# Patient Record
Sex: FEMALE | Race: WHITE | Employment: OTHER | URBAN - METROPOLITAN AREA
[De-identification: names, ages, dates, MRNs, and addresses within clinical notes are randomized per-mention and may not be internally consistent; named-entity substitution may affect disease eponyms.]

---

## 2017-02-16 ENCOUNTER — HOSPITAL ENCOUNTER (OUTPATIENT)
Facility: CLINIC | Age: 72
End: 2017-02-16
Attending: OPHTHALMOLOGY | Admitting: OPHTHALMOLOGY
Payer: COMMERCIAL

## 2017-03-13 ENCOUNTER — HOSPITAL ENCOUNTER (OUTPATIENT)
Facility: CLINIC | Age: 72
Discharge: HOME | End: 2017-03-13
Attending: OPHTHALMOLOGY | Admitting: OPHTHALMOLOGY
Payer: COMMERCIAL

## 2017-03-13 PROCEDURE — 65760 KERATOMILEUSIS: CPT | Performed by: OPHTHALMOLOGY

## 2018-12-12 ENCOUNTER — OFFICE VISIT (OUTPATIENT)
Dept: FAMILY MEDICINE | Facility: CLINIC | Age: 73
End: 2018-12-12
Attending: FAMILY MEDICINE
Payer: COMMERCIAL

## 2018-12-12 VITALS
HEART RATE: 81 BPM | BODY MASS INDEX: 28.51 KG/M2 | SYSTOLIC BLOOD PRESSURE: 137 MMHG | HEIGHT: 64 IN | DIASTOLIC BLOOD PRESSURE: 92 MMHG | WEIGHT: 167 LBS

## 2018-12-12 DIAGNOSIS — R73.03 PRE-DIABETES: ICD-10-CM

## 2018-12-12 DIAGNOSIS — E78.00 HYPERCHOLESTEREMIA: ICD-10-CM

## 2018-12-12 DIAGNOSIS — N81.10 BLADDER PROLAPSE, FEMALE, ACQUIRED: ICD-10-CM

## 2018-12-12 DIAGNOSIS — K52.9 CHRONIC DIARRHEA: Primary | ICD-10-CM

## 2018-12-12 DIAGNOSIS — K22.70 BARRETT'S ESOPHAGUS WITHOUT DYSPLASIA: ICD-10-CM

## 2018-12-12 DIAGNOSIS — I10 BENIGN ESSENTIAL HYPERTENSION: ICD-10-CM

## 2018-12-12 DIAGNOSIS — E78.2 MIXED HYPERLIPIDEMIA: ICD-10-CM

## 2018-12-12 LAB
ANION GAP SERPL CALCULATED.3IONS-SCNC: 8 MMOL/L (ref 3–14)
BUN SERPL-MCNC: 15 MG/DL (ref 7–30)
CALCIUM SERPL-MCNC: 9 MG/DL (ref 8.5–10.1)
CHLORIDE SERPL-SCNC: 102 MMOL/L (ref 94–109)
CHOLEST SERPL-MCNC: 246 MG/DL
CO2 SERPL-SCNC: 30 MMOL/L (ref 20–32)
CREAT SERPL-MCNC: 0.52 MG/DL (ref 0.52–1.04)
GFR SERPL CREATININE-BSD FRML MDRD: >90 ML/MIN/1.7M2
GLUCOSE SERPL-MCNC: 101 MG/DL (ref 70–99)
HBA1C MFR BLD: 5.9 % (ref 0–5.6)
HDLC SERPL-MCNC: 80 MG/DL
LDLC SERPL CALC-MCNC: 88 MG/DL
NONHDLC SERPL-MCNC: 166 MG/DL
POTASSIUM SERPL-SCNC: 4.2 MMOL/L (ref 3.4–5.3)
SODIUM SERPL-SCNC: 140 MMOL/L (ref 133–144)
TRIGL SERPL-MCNC: 389 MG/DL

## 2018-12-12 PROCEDURE — 36415 COLL VENOUS BLD VENIPUNCTURE: CPT | Performed by: FAMILY MEDICINE

## 2018-12-12 PROCEDURE — G0463 HOSPITAL OUTPT CLINIC VISIT: HCPCS | Mod: ZF

## 2018-12-12 PROCEDURE — 80048 BASIC METABOLIC PNL TOTAL CA: CPT | Performed by: FAMILY MEDICINE

## 2018-12-12 PROCEDURE — 80061 LIPID PANEL: CPT | Performed by: FAMILY MEDICINE

## 2018-12-12 PROCEDURE — 83036 HEMOGLOBIN GLYCOSYLATED A1C: CPT | Performed by: FAMILY MEDICINE

## 2018-12-12 RX ORDER — CHOLESTYRAMINE 4 G/9G
4 POWDER, FOR SUSPENSION ORAL DAILY
COMMUNITY
End: 2019-07-10

## 2018-12-12 RX ORDER — AMLODIPINE BESYLATE 10 MG/1
10 TABLET ORAL DAILY
COMMUNITY
End: 2018-12-12 | Stop reason: DRUGHIGH

## 2018-12-12 RX ORDER — AMLODIPINE BESYLATE 5 MG/1
5 TABLET ORAL DAILY
Qty: 30 TABLET | Refills: 3 | Status: SHIPPED | OUTPATIENT
Start: 2018-12-12 | End: 2019-05-03

## 2018-12-12 RX ORDER — LOSARTAN POTASSIUM 50 MG/1
50 TABLET ORAL DAILY
Qty: 30 TABLET | Refills: 3 | Status: SHIPPED | OUTPATIENT
Start: 2018-12-12 | End: 2019-05-03

## 2018-12-12 RX ORDER — FUROSEMIDE 20 MG
20 TABLET ORAL DAILY
COMMUNITY
End: 2020-05-15

## 2018-12-12 ASSESSMENT — MIFFLIN-ST. JEOR: SCORE: 1247.51

## 2018-12-12 ASSESSMENT — PAIN SCALES - GENERAL: PAINLEVEL: NO PAIN (0)

## 2018-12-12 NOTE — PROGRESS NOTES
"Pt. Here to establish care      1. Seborrheic dermatitis, \"Chronic sores\"--seeing dermatology at [facility],  was last seen 5/2018, has appointment tomorrow.  2. Chronic daily diarrhea-- Had hiatal hernia repair at [place] (2014). In 2015, had sigmoid colon removed i due to diverticulitis and colon adhering to bladder.  Incisional hernia followed, later repaired. Diarrhea increased followed colon removal.  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 Now treated with imodium and cholestyramine. She is not currently followed by GI. Currently symptoms as follows:  . If only takes only 1 imodium and 1 4-gm scoop cholestyramine, will have up to 6 bm/day, starts as formed bm and then degrades to liquid. Often uses above meds more frequently to manage during day.  3. GERD--Had a past procedure with device that was recalled from market (Enteryx?), but when doctor went to remove it w/ EGD, device was not present. Told had  Christy's esophagus 2016. Take Prilosec prn 2x/wk--  4. Bladder prolapse -- Has bloating from GI symptoms which pushes down on bladder. Hurts to put pessary in and doesn't sit in properly--old pessary. States bladder comes down to opening of vaginal. No urinary incontinence. When use diuretics, have urinary urgency and need to be close to bathroom.     4. HTN-- Taking norvasc currently. It was prescribed as 10 mg by cardiologist, , but became lightheaded and taking only 5 mg. She does not have known CAD or CKD. Had normal echocardiogram, was told Holter normal 6/2018. BP goal set by cardiology was -120.   Per patient, she is also on Lasix 20 mg for peripheral edema and HTN, and had been  On hydrochlorothiazide 25 mg in past. Feels hydrochlorothiazide worked better. She also takes on OTC K+ 99 mg. Daily. Last lab work was 2016.   5. Small cysts on labia--would like them removed  6. Genital wart--tried Aldara, without success. No abnormal Paps  9. Varicose veins and peripheral edema-, which " "improves with activity  10. Hyperlipidemia--not on medication, TGC in 300's on 2016 labl reviewed 2016 tgc 300's,       PMH  Hospitalized x 2 [facility]  Varicose vein surgery x 2  Bowel Surgeries as above  L lung pneumothorax x 2 and then surgery    Current Outpatient Medications   Medication     amLODIPine (NORVASC) 10 MG tablet     BIOTIN PO     cholestyramine (QUESTRAN) 4 GM/DOSE powder     furosemide (LASIX) 20 MG tablet     Loperamide HCl (IMODIUM OR)     Multiple Vitamins-Minerals (MULTIVITAL PO)     multivitamin  with lutein (OCUVITE WITH LTEIN) CAPS     No current facility-administered medications for this visit.      Social History     Socioeconomic History     Marital status:      Spouse name: Not on file     Number of children: Not on file     Years of education: Not on file     Highest education level: Not on file   Social Needs     Financial resource strain: Not on file     Food insecurity - worry: Not on file     Food insecurity - inability: Not on file     Transportation needs - medical: Not on file     Transportation needs - non-medical: Not on file   Occupational History     Not on file   Tobacco Use     Smoking status: Former Smoker     Smokeless tobacco: Never Used   Substance and Sexual Activity     Alcohol use: Yes     Drug use: No     Sexual activity: Not Currently     Partners: Male     Birth control/protection: None   Other Topics Concern     Not on file   Social History Narrative     Not on file         ROS  Increased stress (death in family)  Increased eating  Occasional lightheadedness with imodium  PHQ9-9, GAD7=6  12 point ROS negative except where noted above    PE  BP (!) 137/92   Pulse 81   Ht 1.626 m (5' 4\")   Wt 75.8 kg (167 lb)   Breastfeeding? No   BMI 28.67 kg/m    Constitutional: Well appearing woman in no acute distress.   Psychological: appropriate mood.  Eyes: anicteric, normal extra-ocular movements,  pupils are equal and reactive to light.   Ears, Nose and Throat:  " throat clear, moist mucous membrames, neck supple with full range of motion.    Neck: No thyroidmegaly. No jugular venous distension, no carotid bruits.  Cardiovascular: regular rate and rhythm, normal S1 and S2, no murmurs, rubs or gallops, peripheral pulses full and symmetric   Respiratory: clear to auscultation, no wheezes or crackles, normal breath sounds.  Gastrointestinal: positive bowel sounds, nontender, no hepatosplenomegaly, no masses. No guarding or rebound. Mild diasthesis rectus  Lymphatic: no cervical lymphadenopathy.  Musculoskeletal: full range of motion and motor strength is equal in the upper and lower extremities    1-2+ pitting edema starts at ankles to below knee, feet only trace. bilateral  Skin: no concerning lesions, no jaundice.  Neurological: cranial nerves intact, normal strength and sensation, reflexes at patella and biceps normal, normal gait, no tremor.   Monofilament Foot Exam: n/a  A/P  1. Chronic diarrhea--multiple medical and surgical factors  Refer to GI for evaluation and management  2. HTN--not controlled on current regimen, side effects given bladder prolapse history. Discussed the nature and treatment of HTN, goal bp 130/80 or less in absence of CAD or CKD. Given urinary symptoms and chronic diarrhea, will avoid diuretics, as not helping edema significantly.  Reduce Norvasc to 5 mg daily, add losartan 50 mg daily. Check BMP.   3. Hyperlipidemia-Fasting lipid panel, will treat as indicated, given elevated TGC, also check HgbA1c  4. GERD--discussed the nature and treatment of GERD/heartburn, and limited role of  hiatal hernia. Plan to stop prilosec, track frequency of symptmos, treat w/liquid antacid prn. If symptoms more than 1-2x/week, can use Zantac otc  5. Will evaluate  issues at next visit and refer to specialists (urology, gyne, etc as needed)    Follow-up 1 month for preventive care visit and re-evaluate HTN

## 2018-12-12 NOTE — LETTER
"12/12/2018       RE: Mariia Hogue  [address]  [facility] [id number]     Dear Colleague,    Thank you for referring your patient, Mariia Hogue, to the [facility] at [facility]. Please see a copy of my visit note below.    Pt. Here to establish care    1. Seborrheic dermatitis, \"Chronic sores\"--seeing dermatology at [facility],  was last seen 5/2018, has appointment tomorrow.  2. Chronic daily diarrhea-- Had hiatal hernia repair at [place] (2014). In 2015, had sigmoid colon removed i due to diverticulitis and colon adhering to bladder.  Incisional hernia followed, later repaired. Diarrhea increased followed colon removal.  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 Now treated with imodium and cholestyramine. She is not currently followed by GI. Currently symptoms as follows:  . If only takes only 1 imodium and 1 4-gm scoop cholestyramine, will have up to 6 bm/day, starts as formed bm and then degrades to liquid. Often uses above meds more frequently to manage during day.  3. GERD--Had a past procedure with device that was recalled from market (Enteryx?), but when doctor went to remove it w/ EGD, device was not present. Told had  Christy's esophagus 2016. Take Prilosec prn 2x/wk--  4. Bladder prolapse -- Has bloating from GI symptoms which pushes down on bladder. Hurts to put pessary in and doesn't sit in properly--old pessary. States bladder comes down to opening of vaginal. No urinary incontinence. When use diuretics, have urinary urgency and need to be close to bathroom.     4. HTN-- Taking norvasc currently. It was prescribed as 10 mg by cardiologist, , but became lightheaded and taking only 5 mg. She does not have known CAD or CKD. Had normal echocardiogram, was told Holter normal 6/2018. BP goal set by cardiology was -120.   Per patient, she is also on Lasix 20 mg for peripheral edema and HTN, and had been  On " hydrochlorothiazide 25 mg in past. Feels hydrochlorothiazide worked better. She also takes on OTC K+ 99 mg. Daily. Last lab work was 2016.   5. Small cysts on labia--would like them removed  6. Genital wart--tried Aldara, without success. No abnormal Paps  9. Varicose veins and peripheral edema-, which improves with activity  10. Hyperlipidemia--not on medication, TGC in 300's on 2016 labl reviewed 2016 tgc 300's,       PMH  Hospitalized x 2 [facility]  Varicose vein surgery x 2  Bowel Surgeries as above  L lung pneumothorax x 2 and then surgery    Current Outpatient Medications   Medication     amLODIPine (NORVASC) 10 MG tablet     BIOTIN PO     cholestyramine (QUESTRAN) 4 GM/DOSE powder     furosemide (LASIX) 20 MG tablet     Loperamide HCl (IMODIUM OR)     Multiple Vitamins-Minerals (MULTIVITAL PO)     multivitamin  with lutein (OCUVITE WITH LTEIN) CAPS     No current facility-administered medications for this visit.      Social History     Socioeconomic History     Marital status:      Spouse name: Not on file     Number of children: Not on file     Years of education: Not on file     Highest education level: Not on file   Social Needs     Financial resource strain: Not on file     Food insecurity - worry: Not on file     Food insecurity - inability: Not on file     Transportation needs - medical: Not on file     Transportation needs - non-medical: Not on file   Occupational History     Not on file   Tobacco Use     Smoking status: Former Smoker     Smokeless tobacco: Never Used   Substance and Sexual Activity     Alcohol use: Yes     Drug use: No     Sexual activity: Not Currently     Partners: Male     Birth control/protection: None   Other Topics Concern     Not on file   Social History Narrative     Not on file         ROS  Increased stress (death in family)  Increased eating  Occasional lightheadedness with imodium  PHQ9-9, GAD7=6  12 point ROS negative except where noted above    PE  BP (!) 137/92   Pulse  "81   Ht 1.626 m (5' 4\")   Wt 75.8 kg (167 lb)   Breastfeeding? No   BMI 28.67 kg/m     Constitutional: Well appearing woman in no acute distress.   Psychological: appropriate mood.  Eyes: anicteric, normal extra-ocular movements,  pupils are equal and reactive to light.   Ears, Nose and Throat:  throat clear, moist mucous membrames, neck supple with full range of motion.    Neck: No thyroidmegaly. No jugular venous distension, no carotid bruits.  Cardiovascular: regular rate and rhythm, normal S1 and S2, no murmurs, rubs or gallops, peripheral pulses full and symmetric   Respiratory: clear to auscultation, no wheezes or crackles, normal breath sounds.  Gastrointestinal: positive bowel sounds, nontender, no hepatosplenomegaly, no masses. No guarding or rebound. Mild diasthesis rectus  Lymphatic: no cervical lymphadenopathy.  Musculoskeletal: full range of motion and motor strength is equal in the upper and lower extremities    1-2+ pitting edema starts at ankles to below knee, feet only trace. bilateral  Skin: no concerning lesions, no jaundice.  Neurological: cranial nerves intact, normal strength and sensation, reflexes at patella and biceps normal, normal gait, no tremor.   Monofilament Foot Exam: n/a  A/P  1. Chronic diarrhea--multiple medical and surgical factors  Refer to GI for evaluation and management  2. HTN--not controlled on current regimen, side effects given bladder prolapse history. Discussed the nature and treatment of HTN, goal bp 130/80 or less in absence of CAD or CKD. Given urinary symptoms and chronic diarrhea, will avoid diuretics, as not helping edema significantly.  Reduce Norvasc to 5 mg daily, add losartan 50 mg daily. Check BMP.   3. Hyperlipidemia-Fasting lipid panel, will treat as indicated, given elevated TGC, also check HgbA1c  4. GERD--discussed the nature and treatment of GERD/heartburn, and limited role of  hiatal hernia. Plan to stop prilosec, track frequency of symptmos, treat " w/liquid antacid prn. If symptoms more than 1-2x/week, can use Zantac otc  5. Will evaluate  issues at next visit and refer to specialists (urology, gyne, etc as needed)    Follow-up 1 month for preventive care visit and re-evaluate HTN    Again, thank you for allowing me to participate in the care of your patient.      Sincerely,    Stewart Stevens MD

## 2018-12-19 PROBLEM — K52.9 CHRONIC DIARRHEA: Status: ACTIVE | Noted: 2018-12-19

## 2018-12-19 PROBLEM — I10 BENIGN ESSENTIAL HYPERTENSION: Status: ACTIVE | Noted: 2018-12-19

## 2018-12-19 PROBLEM — K22.70 BARRETT'S ESOPHAGUS WITHOUT DYSPLASIA: Status: ACTIVE | Noted: 2018-12-19

## 2018-12-19 PROBLEM — N81.10 BLADDER PROLAPSE, FEMALE, ACQUIRED: Status: ACTIVE | Noted: 2018-12-19

## 2018-12-19 PROBLEM — H90.3 BILATERAL SENSORINEURAL HEARING LOSS: Status: ACTIVE | Noted: 2018-04-25

## 2018-12-20 ENCOUNTER — TELEPHONE (OUTPATIENT)
Dept: OBGYN | Facility: CLINIC | Age: 73
End: 2018-12-20

## 2018-12-20 NOTE — TELEPHONE ENCOUNTER
Mariia missed a call from Dr Stevens yesterday so calling back. She is in the process of getting her mychart up and running. She is a new pt for [facility].    I gave her results today over the phone. She has a f/u with Dr Stevens in Jan but not sure if Dr Stevens  wanted her to do anything prior. Dr Stevens back in [facility] 12/26 so asked Mariia to call to triage at that time so we can ask Dr Stevens directly if anything needed prior to appt. Also at that time Mariia should be on mychart and we can release her labwork at that time.Pt indicated understanding and agreed with plan.

## 2018-12-21 ENCOUNTER — TELEPHONE (OUTPATIENT)
Dept: FAMILY MEDICINE | Facility: CLINIC | Age: 73
End: 2018-12-21

## 2018-12-21 PROBLEM — E78.2 MIXED HYPERLIPIDEMIA: Status: ACTIVE | Noted: 2018-12-21

## 2018-12-21 PROBLEM — R73.03 PRE-DIABETES: Status: ACTIVE | Noted: 2018-12-21

## 2018-12-21 NOTE — TELEPHONE ENCOUNTER
Spoke with patient on lab results. Pt was fasting. HgbA1c 5.9, and consistent with pre-DM. Discussed treatment involved diet, activity and sometimes medication, but would need to see GI first given medication potential GI side effects. , discussed factors in this including preDM, fatty foods and especially simple carbohydrates, including ETOH.. Discussed treatment including diet changes, potentially statin medication given A1c.     Reviewed that she should call GI clinic to schedule appt, and at next visit in 1 month will re-evaluate HTN, address pelvic and preventive needs, and readdress above in more detail.

## 2019-01-09 ENCOUNTER — OFFICE VISIT (OUTPATIENT)
Dept: FAMILY MEDICINE | Facility: CLINIC | Age: 74
End: 2019-01-09
Attending: FAMILY MEDICINE
Payer: COMMERCIAL

## 2019-01-09 VITALS
BODY MASS INDEX: 28.31 KG/M2 | DIASTOLIC BLOOD PRESSURE: 79 MMHG | WEIGHT: 164.9 LBS | HEART RATE: 96 BPM | SYSTOLIC BLOOD PRESSURE: 129 MMHG

## 2019-01-09 DIAGNOSIS — Z13.820 SCREENING FOR OSTEOPOROSIS: Primary | ICD-10-CM

## 2019-01-09 DIAGNOSIS — A63.0 GENITAL WARTS: ICD-10-CM

## 2019-01-09 DIAGNOSIS — Z12.39 SCREENING FOR BREAST CANCER: ICD-10-CM

## 2019-01-09 DIAGNOSIS — E78.5 HYPERLIPIDEMIA LDL GOAL <100: ICD-10-CM

## 2019-01-09 DIAGNOSIS — I10 BENIGN ESSENTIAL HYPERTENSION: ICD-10-CM

## 2019-01-09 PROCEDURE — 90750 HZV VACC RECOMBINANT IM: CPT | Mod: ZF

## 2019-01-09 PROCEDURE — G0463 HOSPITAL OUTPT CLINIC VISIT: HCPCS | Mod: ZF

## 2019-01-09 PROCEDURE — 25000581 ZZH RX MED A9270 GY (STAT IND- M) 250: Mod: ZF

## 2019-01-09 PROCEDURE — 90472 IMMUNIZATION ADMIN EACH ADD: CPT | Mod: ZF

## 2019-01-09 PROCEDURE — 90471 IMMUNIZATION ADMIN: CPT | Mod: ZF

## 2019-01-09 PROCEDURE — 25000128 H RX IP 250 OP 636: Mod: ZF

## 2019-01-09 PROCEDURE — 90715 TDAP VACCINE 7 YRS/> IM: CPT | Mod: ZF

## 2019-01-09 RX ORDER — IMIQUIMOD 12.5 MG/.25G
CREAM TOPICAL
Qty: 24 PACKET | Refills: 0 | Status: SHIPPED | OUTPATIENT
Start: 2019-01-09 | End: 2019-10-07

## 2019-01-09 RX ORDER — ATORVASTATIN CALCIUM 10 MG/1
10 TABLET, FILM COATED ORAL DAILY
Qty: 30 TABLET | Refills: 3 | Status: SHIPPED | OUTPATIENT
Start: 2019-01-09 | End: 2019-08-07

## 2019-01-09 ASSESSMENT — PAIN SCALES - GENERAL: PAINLEVEL: NO PAIN (0)

## 2019-01-09 NOTE — PROGRESS NOTES
"Pt.  Here for Preventive care and BP check    1. HTN.--Currently taking Norvasc 5 mg, Losartan. Started out at 50 mg of latter. She has felt off balance for a long time and intially felt better since last visit (off lasix and hydrochlorothiazide), then symptoms felt worse again about last week, so decreased losartan to 25 mg Now on this dose 6 days. Has had this symptom in past, seen ENT for this. Hearing checked.     2. Lipids--Reviewed lab with patient: Cholesterol 246, ,    3. PreDM--reviewed lab with patient. Patient considering Keto diet.   4. Breast--no concerns--no abnormal mammogram in past,  due for mammogram, dense breasts--done at [facility] in past. No Fhx breast cancer  5. Gyne--menopause--age 44.. Paps normal in past. Last pap , normal. No hot flashes/night sweats.   No vaginal discharge. No dryness.  Not currently sexually active with ,  with low libido--  \"sad\" but not current priority.  elective abs. Did take HRT for 2 years. No vaginal estrogen  6. \"Bladder prolapse\" -- Has bloating from GI symptoms which pushes down on bladder. Hurts to put pessary in and doesn't sit in properly--old pessary,.bleeding with use States bladder comes down to opening of vaginal. No urinary incontinence. When uses diuretics, have urinary urgency and need to be close to bathroomBetter when weight is down. Never did pelvic PT.   7. Small cysts on labia--would like them removed. Itching. Present X  at least  8. Genital warts--tried Aldara, without success  9. HCM--dtaP--1952, dt----due, old zoster--, , pneumonia conj--, poly pneumonia \"complete  Hep B series done, had flu vaccine this year  10.Bone--DEXA in past, normal, more than 5 years ago.    PMH  Hospitalized x 2 [facility]  Varicose vein surgery x 2  Bowel Surgeries as above  L lung pneumothorax x 2 and then surgery     FAMILY Hx  Mother--mutilple small ischemic events, dementia,   Father--d. Small cell lung ca, " ETOH,   Sibs--arthritis,   grandfather CVA  grandmother--HTN  Aunt--pancreatic  Grandfather--rectal ca    M. Aunt--ETOH    Social History     Socioeconomic History     Marital status:      Spouse name: Not on file     Number of children: Not on file     Years of education: Not on file     Highest education level: Not on file   Social Needs     Financial resource strain: Not on file     Food insecurity - worry: Not on file     Food insecurity - inability: Not on file     Transportation needs - medical: Not on file     Transportation needs - non-medical: Not on file   Occupational History     Not on file   Tobacco Use     Smoking status: Former Smoker     Smokeless tobacco: Never Used   Substance and Sexual Activity     Alcohol use: Yes     Drug use: No     Sexual activity: Not Currently     Partners: Male     Birth control/protection: None   Other Topics Concern     Not on file   Social History Narrative     Not on file         ROS  12 point ROS negative except where noted above    PE  Blood pressure 129/79, pulse 96, weight 74.8 kg (164 lb 14.4 oz), not currently breastfeeding.  alert, NAD  Genitourinary: External genitalia is atrophic appearance. Normal hair distrution. No enlargement of the [place]th[place] or [place] glands. 2 small epidermoid cyst superifical R labia innner, 3 on L inner labia. Multiple small genital warts posterior fourchette     Urethra and bladder are non-tender. Vagina is without lesions or discharge. Atrophic epithelium, no anterior or posterior wall defects. Uterus is normal size, shape, and contour. Adnexa without tenderness or enlarged. No cystocele or bladder prolapse present, including with Valsalva.  Exam: : atrophic change:  No cystocele or evidence of bladder prolapse on bimanual exam, uterus/adenexa normal      A/P  1. HTN  Blood pressure controlled on Losartan 25 mg and amlodipine 5 mg daily. Instructed patient not to self-change dose, to call if side effects.   2. Dizziness,  chronic--consider MRI/MRA, neurology evaluation if persistemt  3. Hyperlipidemia--Cousneled regarding risks/benefits statin, start lipitor 10 mg daily  4. Pre DM--Discussed lower carb diet, reducing simple carbs. Reviewed data on Keto diet. Pt strongly recommend to reduce from ETOH--daily 2 glasses/day currrently to less than 3x/week  5. Labial cysts--discussed removal, can be done in office, recommend do 1 side at a time. To schedule   6. Genital warts--discussed treatment options including Aldara and cryo. Pt. Elects retrial Aldara  3x/wk for 8 weeks, to do after cyst removal  7. Immunizations--Dtap, shingrix    Follow-up for cyst removal

## 2019-01-09 NOTE — LETTER
"2019       RE: Lashell Hogue  [address]  [facility] [address]     Dear Colleague,    Thank you for referring your patient, Lashell Hogue, to the [facility] at [facility]. Please see a copy of my visit note below.    Physical Exam    Pt.  Here for Preventive care and BP check    1. HTN.--Currently taking Norvasc 5 mg, Losartan. Started out at 50 mg of latter. She has felt off balance for a long time and intially felt better since last visit (off lasix and hydrochlorothiazide), then symptoms felt worse again about last week, so decreased losartan to 25 mg Now on this dose 6 days. Has had this symptom in past, seen ENT for this. Hearing checked.     2. Lipids--Reviewed lab with patient: Cholesterol 246, ,    3. PreDM--reviewed lab with patient. Patient considering Keto diet.   4. Breast--no concerns--no abnormal mammogram in past,  due for mammogram, dense breasts--done at [facility] in past. No Fhx breast cancer  5. Gyne--menopause--age 44.. Paps normal in past. Last pap , normal. No hot flashes/night sweats.   No vaginal discharge. No dryness.  Not currently sexually active with ,  with low libido--  \"sad\" but not current priority.  elective abs. Did take HRT for 2 years. No vaginal estrogen  6. \"Bladder prolapse\" -- Has bloating from GI symptoms which pushes down on bladder. Hurts to put pessary in and doesn't sit in properly--old pessary ,.bleeding with use States bladder comes down to opening of vaginal. No urinary incontinence. When uses diuretics, have urinary urgency and need to be close to bathroomBetter when weight is down. Never did pelvic PT.   7. Small cysts on labia--would like them removed. Itching. Present X  at least  8. Genital warts--tried Aldara, without success  9. HCM--dtaP--1952, dt----due, old zoster--, , pneumonia conj--, poly pneumonia " "\"complete  Hep B series done, had flu vaccine this year  10.Bone--DEXA in past, normal, more than 5 years ago.    PMH  Hospitalized x 2 [facility]  Varicose vein surgery x 2  Bowel Surgeries as above  L lung pneumothorax x 2 and then surgery     FAMILY Hx  Mother--mutilple small ischemic events, dementia,   Father--d. Small cell lung ca, ETOH,   Sibs--arthritis,   grandfather CVA  grandmother--HTN  Aunt--pancreatic  Grandfather--rectal ca    M. Aunt--ETOH    Social History     Socioeconomic History     Marital status:      Spouse name: Not on file     Number of children: Not on file     Years of education: Not on file     Highest education level: Not on file   Social Needs     Financial resource strain: Not on file     Food insecurity - worry: Not on file     Food insecurity - inability: Not on file     Transportation needs - medical: Not on file     Transportation needs - non-medical: Not on file   Occupational History     Not on file   Tobacco Use     Smoking status: Former Smoker     Smokeless tobacco: Never Used   Substance and Sexual Activity     Alcohol use: Yes     Drug use: No     Sexual activity: Not Currently     Partners: Male     Birth control/protection: None   Other Topics Concern     Not on file   Social History Narrative     Not on file     PE  Blood pressure 129/79, pulse 96, weight 74.8 kg (164 lb 14.4 oz), not currently breastfeeding.  alert, NAD  Genitourinary: External genitalia is atrophic appearance. Normal hair distrution. No enlargement of the [place] or [place] glands. 2 small epidermoid cyst superifical R labia innner, 3 on L inner labia. Multiple small genital warts posterior fourchette     Urethra and bladder are non-tender. Vagina is without lesions or discharge. Atrophic epithelium, no anterior or posterior wall defects. Uterus is normal size, shape, and contour. Adnexa without tenderness or enlarged. No cystocele or bladder prolapse present, including with Valsalva.  Exam: : " atrophic change:  No cystocele or evidence of bladder prolapse on bimanual exam, uterus/adenexa normal      A/P  1. HTN  Blood pressure controlled on Losartan 25 mg and amlodipine 5 mg daily. Instructed patient not to self-change dose, to call if side effects.   2. Dizziness, chronic--consider MRI/MRA, neurology evaluation if persistemt  3. Hyperlipidemia--Cousneled regarding risks/benefits statin, start lipitor 10 mg daily  4. Pre DM--Discussed lower carb diet, reducing simple carbs. Reviewed data on Keto diet. Pt strongly recommend to reduce from ETOH--daily 2 glasses/day currrently to less than 3x/week  5. Labial cysts--discussed removal, can be done in office, recommend do 1 side at a time. To schedule   6. Genital warts--discussed treatment options including Aldara and cryo. Pt. Elects retrial Aldara  3x/wk for 8 weeks, to do after cyst removal  7. Immunizations--Dtap, shingrix    Follow-up for cyst removal          Again, thank you for allowing me to participate in the care of your patient.      Sincerely,    Stewart Stevens MD

## 2019-01-09 NOTE — NURSING NOTE
Chief Complaint   Patient presents with     Follow Up     one month follow up     Patient Request     questions about skin      Health Maintenance Due   Topic Date Due     PHQ-2 Q1 YR  08/26/1957     HEPATITIS C SCREENING  08/26/1963     DTAP/TDAP/TD IMMUNIZATION (1 - Tdap) 08/26/1970     MAMMO SCREEN Q2 YR (SYSTEM ASSIGNED)  08/26/1985     COLON CANCER SCREEN (SYSTEM ASSIGNED)  08/26/1995     ZOSTER IMMUNIZATION (1 of 2) 08/26/1995     ADVANCE DIRECTIVE PLANNING Q5 YRS  08/26/2000     FALL RISK ASSESSMENT  08/26/2010     DEXA SCAN SCREENING (SYSTEM ASSIGNED)  08/26/2010     PNEUMOVAX IMMUNIZATION 65+ LOW/MEDIUM RISK (1 of 2 - PCV13) 08/26/2010     Day Pozo CMA on 1/9/2019 at 11:41 AM    Patient blood pressure average is 127/80. Day Pozo CMA on 1/9/2019 at 11:57 AM

## 2019-01-10 ENCOUNTER — ANCILLARY PROCEDURE (OUTPATIENT)
Dept: MAMMOGRAPHY | Facility: CLINIC | Age: 74
End: 2019-01-10
Payer: COMMERCIAL

## 2019-01-10 ENCOUNTER — ANCILLARY PROCEDURE (OUTPATIENT)
Dept: BONE DENSITY | Facility: CLINIC | Age: 74
End: 2019-01-10
Payer: COMMERCIAL

## 2019-01-10 DIAGNOSIS — Z13.820 SCREENING FOR OSTEOPOROSIS: ICD-10-CM

## 2019-01-10 DIAGNOSIS — Z12.39 SCREENING FOR BREAST CANCER: ICD-10-CM

## 2019-01-16 PROBLEM — A63.0 GENITAL WARTS: Status: ACTIVE | Noted: 2019-01-16

## 2019-01-16 PROBLEM — N81.10 BLADDER PROLAPSE, FEMALE, ACQUIRED: Status: RESOLVED | Noted: 2018-12-19 | Resolved: 2019-01-16

## 2019-01-18 NOTE — RESULT ENCOUNTER NOTE
Dear Lashell Chiang,   Here are your recent results. Your DEXA indicates low bone density at the left hip. The recommended treatment is adequate Vitamin D in the diet or supplement of 600-1000 units daily, and regular weight bearing exercise. We should repeat the scan in 2-3 years.    Stewart Stevens MD

## 2019-01-21 NOTE — RESULT ENCOUNTER NOTE
Dear Lashell Chiang,   Here are your recent results which are within the expected normal range. Please continue with your current plan of care.       Stewart Stevens MD

## 2019-03-11 ENCOUNTER — ALLIED HEALTH/NURSE VISIT (OUTPATIENT)
Dept: OBGYN | Facility: CLINIC | Age: 74
End: 2019-03-11
Payer: COMMERCIAL

## 2019-03-11 DIAGNOSIS — Z23 NEED FOR SHINGLES VACCINE: Primary | ICD-10-CM

## 2019-03-11 PROCEDURE — 25000581 ZZH RX MED A9270 GY (STAT IND- M) 250: Mod: ZF

## 2019-03-11 PROCEDURE — 90750 HZV VACC RECOMBINANT IM: CPT | Mod: ZF

## 2019-03-11 PROCEDURE — 90471 IMMUNIZATION ADMIN: CPT | Mod: ZF

## 2019-03-11 PROCEDURE — 40000269 ZZH STATISTIC NO CHARGE FACILITY FEE: Mod: ZF

## 2019-03-11 NOTE — NURSING NOTE
Chief Complaint   Patient presents with     Allied Health Visit     Shingrix 2nd dose       See ELIJAH Morgan 3/11/2019

## 2019-04-22 ENCOUNTER — TELEPHONE (OUTPATIENT)
Dept: GASTROENTEROLOGY | Facility: CLINIC | Age: 74
End: 2019-04-22

## 2019-04-22 NOTE — TELEPHONE ENCOUNTER
PREVISIT INFORMATION                                                    Lashell Hogue scheduled for future visit at [facility] specialty clinics.    Patient is scheduled to see Dr. Mccann on May 31, 2019 @ 830am  Reason for visit: Chronic diarrhea, mesh in abdomen   Referring provider: Dr. Stevens  Has patient seen previous specialist? Yes.   Clinic/Facility [facility], [facility] and [facility].   Medical Records:  Available in chart. Will open up Care everywhere for [facility] at appointment time. RMA called and requested records from [facility]. Patient also has records to bring with her.     REVIEW                                                      New patient packet mailed to patient: No  Medication reconciliation complete: No      Current Outpatient Medications   Medication Sig Dispense Refill     amLODIPine (NORVASC) 5 MG tablet Take 1 tablet (5 mg) by mouth daily 30 tablet 3     atorvastatin (LIPITOR) 10 MG tablet Take 1 tablet (10 mg) by mouth daily 30 tablet 3     BIOTIN PO        cholestyramine (QUESTRAN) 4 GM/DOSE powder Take 4 g by mouth daily       furosemide (LASIX) 20 MG tablet Take 20 mg by mouth daily       Loperamide HCl (IMODIUM OR)        losartan (COZAAR) 50 MG tablet Take 1 tablet (50 mg) by mouth daily 30 tablet 3     Multiple Vitamins-Minerals (MULTIVITAL PO)        multivitamin  with lutein (OCUVITE WITH LTEIN) CAPS Take 1 capsule by mouth daily         Allergies: Ciprofloxacin      PLAN/FOLLOW-UP NEEDED                                                      Previsit review complete.  Patient will see provider at future scheduled appointment. RMA called and spoke with patient regarding upcoming appointment.     Patient Reminders Given:  Please, make sure you bring an updated list of your medications.   If you are having a procedure, please, present 15 minutes early.  If you need to cancel or reschedule,please call [phone].    Queta Bashir

## 2019-05-03 DIAGNOSIS — I10 BENIGN ESSENTIAL HYPERTENSION: ICD-10-CM

## 2019-05-03 RX ORDER — LOSARTAN POTASSIUM 25 MG/1
25 TABLET ORAL DAILY
Qty: 90 TABLET | Refills: 1 | Status: SHIPPED | OUTPATIENT
Start: 2019-05-03 | End: 2020-04-15

## 2019-05-03 RX ORDER — AMLODIPINE BESYLATE 5 MG/1
5 TABLET ORAL DAILY
Qty: 90 TABLET | Refills: 2 | Status: SHIPPED | OUTPATIENT
Start: 2019-05-03 | End: 2020-02-12

## 2019-05-03 NOTE — TELEPHONE ENCOUNTER
Received refill request for losartan 50mg.  Last in clinic 1/2019 where it was noted patient has been taking 25mg daily with good control.  Please review.

## 2019-05-03 NOTE — TELEPHONE ENCOUNTER
Received refill request for amlodipine.  Last in clinic 1/2019 where BP was at goal and this was to be continued. Refill sent.

## 2019-05-31 ENCOUNTER — OFFICE VISIT (OUTPATIENT)
Dept: GASTROENTEROLOGY | Facility: CLINIC | Age: 74
End: 2019-05-31
Attending: FAMILY MEDICINE
Payer: COMMERCIAL

## 2019-05-31 VITALS
DIASTOLIC BLOOD PRESSURE: 87 MMHG | BODY MASS INDEX: 27.99 KG/M2 | SYSTOLIC BLOOD PRESSURE: 157 MMHG | WEIGHT: 168 LBS | HEART RATE: 71 BPM | HEIGHT: 65 IN | OXYGEN SATURATION: 96 %

## 2019-05-31 DIAGNOSIS — Z86.0100 HISTORY OF COLONIC POLYPS: ICD-10-CM

## 2019-05-31 DIAGNOSIS — K21.9 GASTROESOPHAGEAL REFLUX DISEASE, ESOPHAGITIS PRESENCE NOT SPECIFIED: ICD-10-CM

## 2019-05-31 DIAGNOSIS — K22.719 BARRETT'S ESOPHAGUS WITH DYSPLASIA: ICD-10-CM

## 2019-05-31 DIAGNOSIS — R19.7 DIARRHEA, UNSPECIFIED TYPE: Primary | ICD-10-CM

## 2019-05-31 LAB
ALBUMIN SERPL-MCNC: 3.6 G/DL (ref 3.4–5)
ALP SERPL-CCNC: 67 U/L (ref 40–150)
ALT SERPL W P-5'-P-CCNC: 27 U/L (ref 0–50)
ANION GAP SERPL CALCULATED.3IONS-SCNC: 7 MMOL/L (ref 3–14)
AST SERPL W P-5'-P-CCNC: 31 U/L (ref 0–45)
BASOPHILS # BLD AUTO: 0 10E9/L (ref 0–0.2)
BASOPHILS NFR BLD AUTO: 0.8 %
BILIRUB SERPL-MCNC: 0.6 MG/DL (ref 0.2–1.3)
BUN SERPL-MCNC: 14 MG/DL (ref 7–30)
CALCIUM SERPL-MCNC: 8.6 MG/DL (ref 8.5–10.1)
CHLORIDE SERPL-SCNC: 104 MMOL/L (ref 94–109)
CO2 SERPL-SCNC: 29 MMOL/L (ref 20–32)
CREAT SERPL-MCNC: 0.49 MG/DL (ref 0.52–1.04)
DIFFERENTIAL METHOD BLD: ABNORMAL
EOSINOPHIL # BLD AUTO: 0.2 10E9/L (ref 0–0.7)
EOSINOPHIL NFR BLD AUTO: 3.3 %
ERYTHROCYTE [DISTWIDTH] IN BLOOD BY AUTOMATED COUNT: 13.9 % (ref 10–15)
GFR SERPL CREATININE-BSD FRML MDRD: >90 ML/MIN/{1.73_M2}
GLUCOSE SERPL-MCNC: 99 MG/DL (ref 70–99)
HCT VFR BLD AUTO: 35.4 % (ref 35–47)
HGB BLD-MCNC: 11.6 G/DL (ref 11.7–15.7)
IMM GRANULOCYTES # BLD: 0 10E9/L (ref 0–0.4)
IMM GRANULOCYTES NFR BLD: 0.4 %
LYMPHOCYTES # BLD AUTO: 1.1 10E9/L (ref 0.8–5.3)
LYMPHOCYTES NFR BLD AUTO: 22.7 %
MCH RBC QN AUTO: 31.9 PG (ref 26.5–33)
MCHC RBC AUTO-ENTMCNC: 32.8 G/DL (ref 31.5–36.5)
MCV RBC AUTO: 97 FL (ref 78–100)
MONOCYTES # BLD AUTO: 0.6 10E9/L (ref 0–1.3)
MONOCYTES NFR BLD AUTO: 11.5 %
NEUTROPHILS # BLD AUTO: 2.9 10E9/L (ref 1.6–8.3)
NEUTROPHILS NFR BLD AUTO: 61.3 %
PLATELET # BLD AUTO: 153 10E9/L (ref 150–450)
POTASSIUM SERPL-SCNC: 4.1 MMOL/L (ref 3.4–5.3)
PROT SERPL-MCNC: 7.2 G/DL (ref 6.8–8.8)
RBC # BLD AUTO: 3.64 10E12/L (ref 3.8–5.2)
SODIUM SERPL-SCNC: 140 MMOL/L (ref 133–144)
WBC # BLD AUTO: 4.8 10E9/L (ref 4–11)

## 2019-05-31 PROCEDURE — 80053 COMPREHEN METABOLIC PANEL: CPT | Performed by: INTERNAL MEDICINE

## 2019-05-31 PROCEDURE — 85025 COMPLETE CBC W/AUTO DIFF WBC: CPT | Performed by: INTERNAL MEDICINE

## 2019-05-31 PROCEDURE — 83516 IMMUNOASSAY NONANTIBODY: CPT | Mod: 59 | Performed by: INTERNAL MEDICINE

## 2019-05-31 PROCEDURE — 83516 IMMUNOASSAY NONANTIBODY: CPT | Performed by: INTERNAL MEDICINE

## 2019-05-31 PROCEDURE — 99204 OFFICE O/P NEW MOD 45 MIN: CPT | Performed by: INTERNAL MEDICINE

## 2019-05-31 PROCEDURE — 82784 ASSAY IGA/IGD/IGG/IGM EACH: CPT | Performed by: INTERNAL MEDICINE

## 2019-05-31 PROCEDURE — 36415 COLL VENOUS BLD VENIPUNCTURE: CPT | Performed by: INTERNAL MEDICINE

## 2019-05-31 ASSESSMENT — MIFFLIN-ST. JEOR: SCORE: 1267.92

## 2019-05-31 NOTE — PATIENT INSTRUCTIONS
Take cholestyramine 1 scoop per day.  Ok to increase to 1.5 scoops per day.    Take metamucil or citrucel 1 scoop per day.  Ok for imodium as needed.    Obtain stool studies.  Obtain labs.    Schedule EGD and colonoscopy with MAC

## 2019-05-31 NOTE — NURSING NOTE
"Lashell Hogue's goals for this visit include:   Chief Complaint   Patient presents with     Consult     Chronic diarrhea       She requests these members of her care team be copied on today's visit information: yes    PCP: Stewart Stevens    Referring Provider:  Stewart Stevens MD  [address] [place] [address]    BP (!) 160/96   Pulse 71   Ht 1.651 m (5' 5\")   Wt 76.2 kg (168 lb)   SpO2 96%   BMI 27.96 kg/m      Do you need any medication refills at today's visit? No    Queta Bashir CMA      "

## 2019-05-31 NOTE — PROGRESS NOTES
GASTROENTEROLOGY NEW PATIENT CLINIC VISIT    CC/REFERRING MD:    Stewart Stevens    REASON FOR CONSULTATION:   Stewart Stevens for   Chief Complaint   Patient presents with     Consult     Chronic diarrhea       HISTORY OF PRESENT ILLNESS:    Lashell Hogue is 73 year old female who presents for evaluation of diarrhea and Christy esophagus.  She reports she was previously followed by [facility] as well as [facility].  She notes that she has had issues with diarrhea ever since a prior sigmoid colectomy.  She notes that she will have loose to watery stools multiple times daily and this limits her ability to participate in social activities or leave the house.  She reports that she has been prescribed cholestyramine in the past for this and when she takes it, her loose stools improve.  When she takes cholestyramine twice a day she gets constipated.  However, she has not been taking this on a regular basis.  Often, she will take Imodium up to 6 tablets/day if she has social events.  She notes that she then will go 2 days without a bowel movement and then will have multiple episodes of watery diarrhea.  She has not taken fiber in the past.  There is no blood in her stool.  She did undergo a colonoscopy in 2016.  Polyps were removed at the time of that exam but it was noted that additional polyps were not resected.  She also notes that she has a history of heartburn.  She has undergone upper endoscopy in 2016 which noted short segment Christy esophagus.  She previously was on omeprazole but stopped taking this medication due to concern for side effects  (she was concerned about bone loss).  There is no family history of esophageal cancer or colon cancer.    PROBLEM LIST  Patient Active Problem List    Diagnosis Date Noted     Genital warts 01/16/2019     Priority: Medium     Mixed hyperlipidemia 12/21/2018     Priority: Medium     Pre-diabetes 12/21/2018     Priority: Medium     Chronic  diarrhea 12/19/2018     Priority: Medium     Christy's esophagus without dysplasia 12/19/2018     Priority: Medium     Benign essential hypertension 12/19/2018     Priority: Medium     Bilateral sensorineural hearing loss 04/25/2018     Priority: Medium     Esophageal reflux 01/07/2011     Priority: Medium     Overview:   Gastroesophageal Reflux Disease         PERTINENT PAST MEDICAL HISTORY:  (I personally reviewed this history with the patient at today's visit)   Past Medical History:   Diagnosis Date     Chronic diarrhea          PREVIOUS SURGERIES: (I personally reviewed this history with the patient at today's visit)   Past Surgical History:   Procedure Laterality Date     ABDOMEN SURGERY       COLONOSCOPY       ENHANCE LASER REFRACTIVE EXISTING PT OUTSIDE PARAMETERS Right 3/13/2017    Procedure: ENHANCE LASER REFRACTIVE EXISTING PT OUTSIDE PARAMETERS;  Surgeon: Bong Guerrero MD;  Location: [facility]     HERNIA REPAIR       PHACOEMULSIFICATION CLEAR CORNEA WITH TORIC INTRAOCULAR LENS IMPLANT Right 4/27/2015    Procedure: PHACOEMULSIFICATION CLEAR CORNEA WITH TORIC INTRAOCULAR LENS IMPLANT;  Surgeon: Bong Guerrero MD;  [facility]     PHACOEMULSIFICATION CLEAR CORNEA WITH TORIC INTRAOCULAR LENS IMPLANT Left 5/11/2015    Procedure: PHACOEMULSIFICATION CLEAR CORNEA WITH TORIC INTRAOCULAR LENS IMPLANT;  Surgeon: Bong Guerrero MD;  Location: [facility]     WAVESCAN SCREENING Right 3/13/2017    Procedure: WAVESCAN SCREENING;  Surgeon: Bong Guerrero MD;  Location:  [facility]         ALLERGIES:     Allergies   Allergen Reactions     Ciprofloxacin      Patient states just got sick        PERTINENT MEDICATIONS:    Current Outpatient Medications:      amLODIPine (NORVASC) 5 MG tablet, Take 1 tablet (5 mg) by mouth daily (Patient taking differently: Take 5 mg by mouth daily 2.5mg tab daily), Disp: 90 tablet, Rfl: 2     atorvastatin (LIPITOR) 10 MG tablet, Take 1 tablet (10 mg) by mouth daily (Patient taking  differently: Take 10 mg by mouth daily 5mg daily), Disp: 30 tablet, Rfl: 3     BIOTIN PO, , Disp: , Rfl:      Biotin w/ Vitamins C & E (HAIR/SKIN/NAILS PO), , Disp: , Rfl:      cholestyramine (QUESTRAN) 4 GM/DOSE powder, Take 4 g by mouth daily, Disp: , Rfl:      Loperamide HCl (IMODIUM OR), , Disp: , Rfl:      losartan (COZAAR) 25 MG tablet, Take 1 tablet (25 mg) by mouth daily . DOSE CHANGE. (Patient taking differently: Take 25 mg by mouth daily Taking 1/2 tab), Disp: 90 tablet, Rfl: 1     vitamin D3 (CHOLECALCIFEROL) 1000 units (25 mcg) tablet, Take by mouth daily, Disp: , Rfl:      furosemide (LASIX) 20 MG tablet, Take 20 mg by mouth daily, Disp: , Rfl:      Multiple Vitamins-Minerals (MULTIVITAL PO), , Disp: , Rfl:      multivitamin  with lutein (OCUVITE WITH LTEIN) CAPS, Take 1 capsule by mouth daily, Disp: , Rfl:     SOCIAL HISTORY:  Social History     Socioeconomic History     Marital status:      Spouse name: Not on file     Number of children: Not on file     Years of education: Not on file     Highest education level: Not on file   Occupational History     Not on file   Social Needs     Financial resource strain: Not on file     Food insecurity:     Worry: Not on file     Inability: Not on file     Transportation needs:     Medical: Not on file     Non-medical: Not on file   Tobacco Use     Smoking status: Former Smoker     Smokeless tobacco: Never Used   Substance and Sexual Activity     Alcohol use: Yes     Drug use: No     Sexual activity: Not Currently     Partners: Male     Birth control/protection: None   Lifestyle     Physical activity:     Days per week: Not on file     Minutes per session: Not on file     Stress: Not on file   Relationships     Social connections:     Talks on phone: Not on file     Gets together: Not on file     Attends [employer] service: Not on file     Active member of club or organization: Not on file     Attends meetings of clubs or organizations: Not on file      "Relationship status: Not on file     Intimate partner violence:     Fear of current or ex partner: Not on file     Emotionally abused: Not on file     Physically abused: Not on file     Forced sexual activity: Not on file   Other Topics Concern     Not on file   Social History Narrative     Not on file       FAMILY HISTORY: (I personally reviewed this history with the patient at today's visit)  No history of GI cancers or inflammatory bowel disease.    ROS:    No fevers or chills  No weight loss  No blurry vision, double vision or change in vision  No sore throat  No lymphadenopathy  No headache, paraesthesias, or weakness in a limb  No shortness of breath or wheezing  No chest pain or pressure  No arthralgias or myalgias  No rashes or skin changes  No odynophagia or dysphagia  No BRBPR, hematochezia, melena  No dysuria, frequency or urgency  No hot/cold intolerance or polyria  No anxiety or depression  PHYSICAL EXAMINATION:  Constitutional: aaox3, cooperative, pleasant, not dyspneic/diaphoretic, no acute distress  Vitals reviewed: /87 (BP Location: Left arm, Patient Position: Sitting, Cuff Size: Adult Regular)   Pulse 71   Ht 1.651 m (5' 5\")   Wt 76.2 kg (168 lb)   SpO2 96%   BMI 27.96 kg/m    Wt:   Wt Readings from Last 2 Encounters:   05/31/19 76.2 kg (168 lb)   01/09/19 74.8 kg (164 lb 14.4 oz)      Eyes: Sclera anicteric/injected  Ears/nose/mouth/throat: Normal oropharynx without ulcers or exudate, mucus membranes moist, hearing intact  Neck: supple, thyroid normal size  CV: No edema, RRR  Respiratory: Unlabored breathing, CTAB  Lymph: No submandibular, supraclavicular or inguinal lymphadenopathy  Abd:  Nondistended, no masses, +bs, no hepatosplenomegaly, nontender, no peritoneal signs  Skin: warm, perfused, no jaundice  Psych: Normal affect  MSK: Normal gait      PERTINENT STUDIES: (I personally reviewed these laboratory studies today)  Most recent CBC:   Recent Labs   Lab Test 05/31/19  0925   WBC " 4.8   HGB 11.6*   HCT 35.4        Most recent hepatic panel:  Recent Labs   Lab Test 05/31/19  0925   ALT 27   AST 31     Most recent creatinine:  Recent Labs   Lab Test 05/31/19 0925 12/12/18  1235   CR 0.49* 0.52       ASSESSMENT/PLAN:    Lashell Hogue is a 73 year old female who presents for evaluation of chronic diarrhea status post sigmoid colectomy and history of short segment Christy esophagus.    Chronic diarrhea: She has chronic diarrhea status post sigmoid colectomy.  Her diarrhea is most likely postsurgical in nature.  She does respond well to cholestyramine but has not taken this regularly and thus the diarrhea continues.  I think that she should be on a daily regimen of cholestyramine once per day.  If this fails to improve her loose stools, it can be increased to twice daily.  She can use Imodium on an as-needed basis but I would recommend limiting it to 1 or 2 tabs per day during these times.  We will also ensure that there are no alternative etiologies and obtain stool studies and labs.  She is due for another colonoscopy as she has a history of colon polyps which were not removed in 2016 and we will schedule this at this point.  We will also obtain biopsies to rule out microscopic colitis.  This will be scheduled with [facility] given that she reports that she has had difficulty with colonoscopy in the past with colonic spasms.  I have also recommended that she initiate fiber with either Metamucil or Citrucel daily.    Short segment Christy esophagus: She has reflux with biopsy-proven short segment Christy esophagus with last EGD in 2016.  I recommended that we repeat the EGD at the time of her colonoscopy with [facility].  I do think that she would benefit from a once daily low-dose PPI as this is been shown to reduce the progression of dysplasia in the setting of Christy esophagus.  In her case I think that the benefit of PPI therapy outweighs the risk of bone loss or other side effects which  are likely to be low.      Diarrhea, unspecified type  Christy's esophagus with dysplasia  Gastroesophageal reflux disease, esophagitis presence not specified  History of colonic polyps  Orders Placed This Encounter   Procedures     IgA     Standing Status:   Future     Number of Occurrences:   1     Standing Expiration Date:   5/30/2020     Tissue transglutaminase sharyn IgA and IgG     Standing Status:   Future     Number of Occurrences:   1     Standing Expiration Date:   5/30/2020     Comprehensive metabolic panel     Standing Status:   Future     Number of Occurrences:   1     Standing Expiration Date:   5/30/2020     CBC with platelets differential     Last Lab Result: No results found for: HGB     Standing Status:   Future     Number of Occurrences:   1     Standing Expiration Date:   5/30/2020     Ova and Parasite Exam Routine     Standing Status:   Future     Standing Expiration Date:   5/30/2020     Clostridium difficile toxin B PCR     Standing Status:   Future     Standing Expiration Date:   6/30/2019       RTC 3 months    Thank you for this consultation.  It was a pleasure to participate in the care of this patient; please contact us with any further questions.    This note was created with voice recognition software, and while reviewed for accuracy, typos may remain.     Juan Carlos Mccann MD  Adjunct  of Medicine  Division of Gastroenterology, Hepatology and Nutrition  [facility]  [phone]

## 2019-06-03 LAB
IGA SERPL-MCNC: 325 MG/DL (ref 70–380)
TTG IGA SER-ACNC: 1 U/ML
TTG IGG SER-ACNC: <1 U/ML

## 2019-06-12 ENCOUNTER — OFFICE VISIT (OUTPATIENT)
Dept: FAMILY MEDICINE | Facility: CLINIC | Age: 74
End: 2019-06-12
Attending: FAMILY MEDICINE
Payer: COMMERCIAL

## 2019-06-12 VITALS
SYSTOLIC BLOOD PRESSURE: 153 MMHG | HEIGHT: 65 IN | WEIGHT: 168.8 LBS | BODY MASS INDEX: 28.12 KG/M2 | DIASTOLIC BLOOD PRESSURE: 81 MMHG | HEART RATE: 70 BPM

## 2019-06-12 DIAGNOSIS — I10 ESSENTIAL HYPERTENSION: ICD-10-CM

## 2019-06-12 DIAGNOSIS — R19.7 DIARRHEA, UNSPECIFIED TYPE: ICD-10-CM

## 2019-06-12 DIAGNOSIS — Z86.0100 HISTORY OF COLONIC POLYPS: ICD-10-CM

## 2019-06-12 DIAGNOSIS — K22.719 BARRETT'S ESOPHAGUS WITH DYSPLASIA: ICD-10-CM

## 2019-06-12 DIAGNOSIS — Z01.818 PRE-OP EXAM: Primary | ICD-10-CM

## 2019-06-12 DIAGNOSIS — K21.9 GASTROESOPHAGEAL REFLUX DISEASE, ESOPHAGITIS PRESENCE NOT SPECIFIED: ICD-10-CM

## 2019-06-12 LAB
C DIFF TOX B STL QL: NEGATIVE
SPECIMEN SOURCE: NORMAL

## 2019-06-12 PROCEDURE — 93005 ELECTROCARDIOGRAM TRACING: CPT | Mod: ZF | Performed by: FAMILY MEDICINE

## 2019-06-12 PROCEDURE — 93010 ELECTROCARDIOGRAM REPORT: CPT | Performed by: INTERNAL MEDICINE

## 2019-06-12 PROCEDURE — 87209 SMEAR COMPLEX STAIN: CPT | Performed by: INTERNAL MEDICINE

## 2019-06-12 PROCEDURE — 87177 OVA AND PARASITES SMEARS: CPT | Performed by: INTERNAL MEDICINE

## 2019-06-12 PROCEDURE — 87493 C DIFF AMPLIFIED PROBE: CPT | Performed by: INTERNAL MEDICINE

## 2019-06-12 PROCEDURE — G0463 HOSPITAL OUTPT CLINIC VISIT: HCPCS | Mod: ZF

## 2019-06-12 RX ORDER — FUROSEMIDE 20 MG
20 TABLET ORAL DAILY
Qty: 90 TABLET | Refills: 0 | Status: SHIPPED | OUTPATIENT
Start: 2019-06-12 | End: 2019-10-07

## 2019-06-12 ASSESSMENT — MIFFLIN-ST. JEOR: SCORE: 1271.55

## 2019-06-12 NOTE — NURSING NOTE
Chief Complaint   Patient presents with     Pre-Op Exam     pt is having colonoscopy and Endoscopy

## 2019-06-12 NOTE — LETTER
2019       RE: Lashell Hogue  [address]  [facility] [address]     Dear Colleague,    Thank you for referring your patient, Lashell Hogue, to the [facility] at [facility]. Please see a copy of my visit note below.    [facility]  [place] [facility]  [address]  [address]  [address]  [facility] [address]  [phone]  Dept: [phone]    PRE-OP EVALUATION:  Today's date: 2019    Lashell Hogue (: 1945) presents for pre-operative evaluation assessment as requested by Dr. Thomas.  She requires evaluation and anesthesia risk assessment prior to undergoing surgery/procedure for treatment of EGD and colonscopy with MAC for chronic diarrhea    Proposed Surgery/ Procedure: EGD and colonscopy with MAC   Date of Surgery/ Procedure: July 10, 2019  .  Hospital/Surgical Facility: [facility]  Fax number for surgical facility: .  Primary Physician: Stewart Stevens  Type of Anesthesia Anticipated:  MAC    Patient has a Health Care Directive or Living Will: Unsure, believes have on file    1. NO - Do you have a history of heart attack, stroke, stent, bypass or surgery on an artery in the head, neck, heart or legs?  2. NO - Do you ever have any pain or discomfort in your chest?  3. NO - Do you have a history of  Heart Failure?  4. NO - Are you troubled by shortness of breath when: walking on the level, up a slight hill or at night?  5. NO - Do you currently have a cold, bronchitis or other respiratory infection?  6. NO - Do you have a cough, shortness of breath or wheezing?  7. NO - Do you sometimes get pains in the calves of your legs when you walk?  8. NO - Do you or anyone in your family have previous history of blood clots?  9. NO - Do you or does anyone in your family have a serious bleeding problem such as prolonged bleeding following surgeries or  cuts?  10. YES - HAVE YOU EVER HAD PROBLEMS WITH ANEMIA OR BEEN TOLD TO TAKE IRON PILLS? Recently told anemia  10. NO - Have you ever had problems with anemia or been told to take iron pills?  11. NO - Have you had any abnormal blood loss such as black, tarry or bloody stools, or abnormal vaginal bleeding?  12. NO - Have you ever had a blood transfusion?  13. NO - Have you or any of your relatives ever had problems with anesthesia?  14. NO - Do you have sleep apnea, excessive snoring or daytime drowsiness?  15. NO - Do you have any prosthetic heart valves?  16. NO - Do you have prosthetic joints?  17. NO - Is there any chance that you may be pregnant?      HPI:     HPI related to upcoming procedure: Patient has history of chronic diarrhea after sigmoid colectomy, as well as Christy's esophagus. She is recommended to have EGD and colonoscopy but need MAC for this procedure due to colonic spasm with procedure in past.       See problem list for active medical problems.  Problems all longstanding and stable, except as noted/documented.  See ROS for pertinent symptoms related to these conditions.    HYPERLIPIDEMIA - Patient has a long history of significant Hyperlipidemia requiring medication for treatment with recent good control. Patient reports no problems or side effects with the medication.     HYPERTENSION - Patient has longstanding history of HTN , currently denies any symptoms referable to elevated blood pressure. Specifically denies chest pain, palpitations, dyspnea, orthopnea, PND, but notes peripheral edema since being off Lasix. Blood pressure readings had been in normal range but increased since being off Lasix Current medication regimen is as listed below. Patient denies any side effects of medication.       MEDICAL HISTORY:     Patient Active Problem List    Diagnosis Date Noted     Genital warts 01/16/2019     Priority: Medium     Mixed hyperlipidemia 12/21/2018     Priority: Medium     Pre-diabetes  12/21/2018     Priority: Medium     Chronic diarrhea 12/19/2018     Priority: Medium     Christy's esophagus without dysplasia 12/19/2018     Priority: Medium     Benign essential hypertension 12/19/2018     Priority: Medium     Bilateral sensorineural hearing loss 04/25/2018     Priority: Medium     Esophageal reflux 01/07/2011     Priority: Medium     Overview:   Gastroesophageal Reflux Disease        Past Medical History:   Diagnosis Date     Chronic diarrhea      Past Surgical History:   Procedure Laterality Date     ABDOMEN SURGERY       COLONOSCOPY       ENHANCE LASER REFRACTIVE EXISTING PT OUTSIDE PARAMETERS Right 3/13/2017    Procedure: ENHANCE LASER REFRACTIVE EXISTING PT OUTSIDE PARAMETERS;  Surgeon: Bong Guerrero MD;  Location: [facility]     HERNIA REPAIR       PHACOEMULSIFICATION CLEAR CORNEA WITH TORIC INTRAOCULAR LENS IMPLANT Right 4/27/2015    Procedure: PHACOEMULSIFICATION CLEAR CORNEA WITH TORIC INTRAOCULAR LENS IMPLANT;  Surgeon: Bong Guerrero MD;  Location: [facility]     PHACOEMULSIFICATION CLEAR CORNEA WITH TORIC INTRAOCULAR LENS IMPLANT Left 5/11/2015    Procedure: PHACOEMULSIFICATION CLEAR CORNEA WITH TORIC INTRAOCULAR LENS IMPLANT;  Surgeon: Bong Guerrero MD;  Location: [facility]     WAVESCAN SCREENING Right 3/13/2017    Procedure: WAVESCAN SCREENING;  Surgeon: Bong Guerrero MD;  Location: [facility]     Current Outpatient Medications   Medication Sig Dispense Refill     amLODIPine (NORVASC) 5 MG tablet Take 1 tablet (5 mg) by mouth daily (Patient taking differently: Take 5 mg by mouth daily 2.5mg tab daily) 90 tablet 2     atorvastatin (LIPITOR) 10 MG tablet Take 1 tablet (10 mg) by mouth daily (Patient taking differently: Take 10 mg by mouth daily 5mg daily) 30 tablet 3     Biotin w/ Vitamins C & E (HAIR/SKIN/NAILS PO)        cholestyramine (QUESTRAN) 4 GM/DOSE powder Take 4 g by mouth daily       Loperamide HCl (IMODIUM OR)        losartan (COZAAR) 25 MG tablet Take 1 tablet (25 mg) by mouth  "daily . DOSE CHANGE. (Patient taking differently: Take 25 mg by mouth daily Taking 1/2 tab) 90 tablet 1     vitamin D3 (CHOLECALCIFEROL) 1000 units (25 mcg) tablet Take by mouth daily       BIOTIN PO        furosemide (LASIX) 20 MG tablet Take 20 mg by mouth daily       Multiple Vitamins-Minerals (MULTIVITAL PO)        multivitamin  with lutein (OCUVITE WITH LTEIN) CAPS Take 1 capsule by mouth daily       OTC products: Took ASA this AM    Allergies   Allergen Reactions     Ciprofloxacin      Patient states just got sick       Latex Allergy: NO    Social History     Tobacco Use     Smoking status: Former Smoker     Smokeless tobacco: Never Used   Substance Use Topics     Alcohol use: Yes     History   Drug Use No       REVIEW OF SYSTEMS:   CONSTITUTIONAL: NEGATIVE for fever, chills, change in weight  EYES: NEGATIVE for vision changes or irritation  ENT/MOUTH: NEGATIVE for ear, mouth and throat problems  RESP: NEGATIVE for significant cough or SOB  CV: NEGATIVE for chest pain, palpitations or peripheral edema  CV: lower extremity edema  GI: NEGATIVE for nausea, abdominal pain, heartburn, or change in bowel habits except as above  : NEGATIVE for frequency, dysuria, or hematuria  MUSCULOSKELETAL: NEGATIVE for significant arthralgias or myalgia:  NEURO: NEGATIVE for weakness,  Paresthesias; + chronic dizziness  ENDOCRINE: NEGATIVE for temperature intolerance, skin/hair changes  HEME: NEGATIVE for bleeding problems  PSYCHIATRIC: NEGATIVE for changes in mood or affect    EXAM:   /81 (BP Location: Left arm, Patient Position: Chair)   Pulse 70   Ht 1.651 m (5' 5\")   Wt 76.6 kg (168 lb 12.8 oz)   BMI 28.09 kg/m       GENERAL APPEARANCE: healthy, alert and no distress     EYES: EOMI, PERRL     HENT: ear canals and TM's normal and nose and mouth without ulcers or lesions     NECK: no adenopathy, no asymmetry, masses, or scars and thyroid normal to palpation     RESP: lungs clear to auscultation - no rales, rhonchi " or wheezes     CV: regular rates and rhythm, normal S1 S2, no S3 or S4 and no murmur, click or rub     ABDOMEN:  soft, nontender, no HSM or masses and bowel sounds normal     MS: extremities normal- no gross deformities noted, no evidence of inflammation in joints, FROM in all extremities.     MS: 1+ edema pitting both ankles     SKIN: no suspicious lesions or rashes     NEURO: Normal strength and tone, sensory exam grossly normal, mentation intact and speech normal     PSYCH: mentation appears normal. and affect normal/bright     LYMPHATICS: No cervical adenopathy    DIAGNOSTICS:   EKG: Normal Sinus Rhythm, ?Left axis, normal intervals, no acute ST/T changes c/w ischemia, no LVH by voltage criteria, Right Bundle Branch Block, Left atrial enlargement    Recent Labs   Lab Test 05/31/19  0925 12/12/18  1235   HGB 11.6*  --      --     140   POTASSIUM 4.1 4.2   CR 0.49* 0.52   A1C  --  5.9*      Last Comprehensive Metabolic Panel:  Sodium   Date Value Ref Range Status   05/31/2019 140 133 - 144 mmol/L Final     Potassium   Date Value Ref Range Status   05/31/2019 4.1 3.4 - 5.3 mmol/L Final     Chloride   Date Value Ref Range Status   05/31/2019 104 94 - 109 mmol/L Final     Carbon Dioxide   Date Value Ref Range Status   05/31/2019 29 20 - 32 mmol/L Final     Anion Gap   Date Value Ref Range Status   05/31/2019 7 3 - 14 mmol/L Final     Glucose   Date Value Ref Range Status   05/31/2019 99 70 - 99 mg/dL Final     Comment:     Non Fasting     Urea Nitrogen   Date Value Ref Range Status   05/31/2019 14 7 - 30 mg/dL Final     Creatinine   Date Value Ref Range Status   05/31/2019 0.49 (L) 0.52 - 1.04 mg/dL Final     GFR Estimate   Date Value Ref Range Status   05/31/2019 >90 >60 mL/min/[1.73_m2] Final     Comment:     Non  GFR Calc  Starting 12/18/2018, serum creatinine based estimated GFR (eGFR) will be   calculated using the Chronic Kidney Disease Epidemiology Collaboration   (CKD-EPI)  equation.       Calcium   Date Value Ref Range Status   05/31/2019 8.6 8.5 - 10.1 mg/dL Final         IMPRESSION:   Reason for surgery/procedure: EGD and colonscopy with MAC for chronic diarrhea  Diagnosis/reason for consult: pre procedure exam      Recommend acetominphen for pain, avoid NSAID, ASA for now  Lasix restart 20 mg daily, check bmp    The proposed surgical procedure is considered LOW risk.    REVISED CARDIAC RISK INDEX  The patient has the following serious cardiovascular risks for perioperative complications such as (MI, PE, VFib and 3  AV Block):  No serious cardiac risks  INTERPRETATION: 0 risks: Class I (very low risk - 0.4% complication rate)    The patient has the following additional risks for perioperative complications:  No identified additional risks  The 10-year ASCVD risk score (Balwinder TORRES JrLeighann, et al., 2013) is: 23.9%    Values used to calculate the score:      Age: 73 years      Sex: Female      Is Non- : No      Diabetic: No      Tobacco smoker: No      Systolic Blood Pressure: 153 mmHg      Is BP treated: Yes      HDL Cholesterol: 80 mg/dL      Total Cholesterol: 246 mg/dL        RECOMMENDATIONS:     Recommend acetominphen for pain, avoid NSAID, ASA for now  Restart Lasix  20 mg daily, check basic metabolic panel in prior to next visit in after procedure    To have nursing confirm that patient is taking 25 mg of losartan and 5 mg amlodipine--which are WHOLE tablets now.    --Patient is to take all scheduled medications on the day of surgery EXCEPT for modifications listed below.    ACE Inhibitor or Angiotensin Receptor Blocker (ARB) Use  Ace inhibitor or Angiotensin Receptor Blocker (ARB)  should HOLD this medication for the 24 hours prior to surgery.    APPROVAL GIVEN to proceed with proposed procedure, without further diagnostic evaluation       Signed Electronically by: Stewart Stevens MD    Copy of this evaluation report is provided to requesting  physician.    [facility] Preop Guidelines    Revised Cardiac Risk Index    Again, thank you for allowing me to participate in the care of your patient.      Sincerely,    Stewart Stevens MD

## 2019-06-12 NOTE — Clinical Note
1. Call patient confirm that patient is taking 25 mg of losartan and 5 mg amlodipine--which are WHOLE tablets now, not half tablets.2. To do blood test before next visit with me after colonscopy to check bp. 3. Remind pt to hold losartan on day of procedure

## 2019-06-12 NOTE — LETTER
Date:June 20, 2019      Patient was self referred, no letter generated. Do not send.        [facility] Health Information

## 2019-06-12 NOTE — PROGRESS NOTES
[facility]  [place] [facility]  [address]  [address]  [facility] [address]  [facility] [address]  [phone]  Dept: [phone]    PRE-OP EVALUATION:  Today's date: 2019    Lashell Hogue (: 1945) presents for pre-operative evaluation assessment as requested by Dr. Thomas.  She requires evaluation and anesthesia risk assessment prior to undergoing surgery/procedure for treatment of EGD and colonscopy with MAC for chronic diarrhea    Proposed Surgery/ Procedure: EGD and colonscopy with MAC   Date of Surgery/ Procedure: July 10, 2019  .  Hospital/Surgical Facility: [facility]  Fax number for surgical facility: .  Primary Physician: Stewart Stevens  Type of Anesthesia Anticipated:  MAC    Patient has a Health Care Directive or Living Will: Unsure, believes have on file    1. NO - Do you have a history of heart attack, stroke, stent, bypass or surgery on an artery in the head, neck, heart or legs?  2. NO - Do you ever have any pain or discomfort in your chest?  3. NO - Do you have a history of  Heart Failure?  4. NO - Are you troubled by shortness of breath when: walking on the level, up a slight hill or at night?  5. NO - Do you currently have a cold, bronchitis or other respiratory infection?  6. NO - Do you have a cough, shortness of breath or wheezing?  7. NO - Do you sometimes get pains in the calves of your legs when you walk?  8. NO - Do you or anyone in your family have previous history of blood clots?  9. NO - Do you or does anyone in your family have a serious bleeding problem such as prolonged bleeding following surgeries or cuts?  10. YES - HAVE YOU EVER HAD PROBLEMS WITH ANEMIA OR BEEN TOLD TO TAKE IRON PILLS? Recently told anemia  10. NO - Have you ever had problems with anemia or been told to take iron pills?  11. NO - Have you had any abnormal blood loss such as black, tarry or bloody stools, or abnormal vaginal bleeding?  12. NO - Have  you ever had a blood transfusion?  13. NO - Have you or any of your relatives ever had problems with anesthesia?  14. NO - Do you have sleep apnea, excessive snoring or daytime drowsiness?  15. NO - Do you have any prosthetic heart valves?  16. NO - Do you have prosthetic joints?  17. NO - Is there any chance that you may be pregnant?      HPI:     HPI related to upcoming procedure: Patient has history of chronic diarrhea after sigmoid colectomy, as well as Christy's esophagus. She is recommended to have EGD and colonoscopy but need MAC for this procedure due to colonic spasm with procedure in past.       See problem list for active medical problems.  Problems all longstanding and stable, except as noted/documented.  See ROS for pertinent symptoms related to these conditions.    HYPERLIPIDEMIA - Patient has a long history of significant Hyperlipidemia requiring medication for treatment with recent good control. Patient reports no problems or side effects with the medication.     HYPERTENSION - Patient has longstanding history of HTN , currently denies any symptoms referable to elevated blood pressure. Specifically denies chest pain, palpitations, dyspnea, orthopnea, PND, but notes peripheral edema since being off Lasix. Blood pressure readings had been in normal range but increased since being off Lasix Current medication regimen is as listed below. Patient denies any side effects of medication.       MEDICAL HISTORY:     Patient Active Problem List    Diagnosis Date Noted     Genital warts 01/16/2019     Priority: Medium     Mixed hyperlipidemia 12/21/2018     Priority: Medium     Pre-diabetes 12/21/2018     Priority: Medium     Chronic diarrhea 12/19/2018     Priority: Medium     Christy's esophagus without dysplasia 12/19/2018     Priority: Medium     Benign essential hypertension 12/19/2018     Priority: Medium     Bilateral sensorineural hearing loss 04/25/2018     Priority: Medium     Esophageal reflux  01/07/2011     Priority: Medium     Overview:   Gastroesophageal Reflux Disease        Past Medical History:   Diagnosis Date     Chronic diarrhea      Past Surgical History:   Procedure Laterality Date     ABDOMEN SURGERY       COLONOSCOPY       ENHANCE LASER REFRACTIVE EXISTING PT OUTSIDE PARAMETERS Right 3/13/2017    Procedure: ENHANCE LASER REFRACTIVE EXISTING PT OUTSIDE PARAMETERS;  Surgeon: Bong Guerrero MD;  Location: [facility]     HERNIA REPAIR       PHACOEMULSIFICATION CLEAR CORNEA WITH TORIC INTRAOCULAR LENS IMPLANT Right 4/27/2015    Procedure: PHACOEMULSIFICATION CLEAR CORNEA WITH TORIC INTRAOCULAR LENS IMPLANT;  Surgeon: Bong Guerrero MD;  Location: [facility]     PHACOEMULSIFICATION CLEAR CORNEA WITH TORIC INTRAOCULAR LENS IMPLANT Left 5/11/2015    Procedure: PHACOEMULSIFICATION CLEAR CORNEA WITH TORIC INTRAOCULAR LENS IMPLANT;  Surgeon: Bong Guerrero MD;  Location: [facility]     WAVESCAN SCREENING Right 3/13/2017    Procedure: WAVESCAN SCREENING;  Surgeon: Bong Guerrero MD;  Location: [facility]     Current Outpatient Medications   Medication Sig Dispense Refill     amLODIPine (NORVASC) 5 MG tablet Take 1 tablet (5 mg) by mouth daily (Patient taking differently: Take 5 mg by mouth daily 2.5mg tab daily) 90 tablet 2     atorvastatin (LIPITOR) 10 MG tablet Take 1 tablet (10 mg) by mouth daily (Patient taking differently: Take 10 mg by mouth daily 5mg daily) 30 tablet 3     Biotin w/ Vitamins C & E (HAIR/SKIN/NAILS PO)        cholestyramine (QUESTRAN) 4 GM/DOSE powder Take 4 g by mouth daily       Loperamide HCl (IMODIUM OR)        losartan (COZAAR) 25 MG tablet Take 1 tablet (25 mg) by mouth daily . DOSE CHANGE. (Patient taking differently: Take 25 mg by mouth daily Taking 1/2 tab) 90 tablet 1     vitamin D3 (CHOLECALCIFEROL) 1000 units (25 mcg) tablet Take by mouth daily       BIOTIN PO        furosemide (LASIX) 20 MG tablet Take 20 mg by mouth daily       Multiple Vitamins-Minerals (MULTIVITAL PO)     "    multivitamin  with lutein (OCUVITE WITH LTEIN) CAPS Take 1 capsule by mouth daily       OTC products: Took ASA this AM    Allergies   Allergen Reactions     Ciprofloxacin      Patient states just got sick       Latex Allergy: NO    Social History     Tobacco Use     Smoking status: Former Smoker     Smokeless tobacco: Never Used   Substance Use Topics     Alcohol use: Yes     History   Drug Use No       REVIEW OF SYSTEMS:   CONSTITUTIONAL: NEGATIVE for fever, chills, change in weight  EYES: NEGATIVE for vision changes or irritation  ENT/MOUTH: NEGATIVE for ear, mouth and throat problems  RESP: NEGATIVE for significant cough or SOB  CV: NEGATIVE for chest pain, palpitations or peripheral edema  CV: lower extremity edema  GI: NEGATIVE for nausea, abdominal pain, heartburn, or change in bowel habits except as above  : NEGATIVE for frequency, dysuria, or hematuria  MUSCULOSKELETAL: NEGATIVE for significant arthralgias or myalgia:  NEURO: NEGATIVE for weakness,  Paresthesias; + chronic dizziness  ENDOCRINE: NEGATIVE for temperature intolerance, skin/hair changes  HEME: NEGATIVE for bleeding problems  PSYCHIATRIC: NEGATIVE for changes in mood or affect    EXAM:   /81 (BP Location: Left arm, Patient Position: Chair)   Pulse 70   Ht 1.651 m (5' 5\")   Wt 76.6 kg (168 lb 12.8 oz)   BMI 28.09 kg/m      GENERAL APPEARANCE: healthy, alert and no distress     EYES: EOMI, PERRL     HENT: ear canals and TM's normal and nose and mouth without ulcers or lesions     NECK: no adenopathy, no asymmetry, masses, or scars and thyroid normal to palpation     RESP: lungs clear to auscultation - no rales, rhonchi or wheezes     CV: regular rates and rhythm, normal S1 S2, no S3 or S4 and no murmur, click or rub     ABDOMEN:  soft, nontender, no HSM or masses and bowel sounds normal     MS: extremities normal- no gross deformities noted, no evidence of inflammation in joints, FROM in all extremities.     MS: 1+ edema pitting " both ankles     SKIN: no suspicious lesions or rashes     NEURO: Normal strength and tone, sensory exam grossly normal, mentation intact and speech normal     PSYCH: mentation appears normal. and affect normal/bright     LYMPHATICS: No cervical adenopathy    DIAGNOSTICS:   EKG: Normal Sinus Rhythm, ?Left axis, normal intervals, no acute ST/T changes c/w ischemia, no LVH by voltage criteria, Right Bundle Branch Block, Left atrial enlargement    Recent Labs   Lab Test 05/31/19  0925 12/12/18  1235   HGB 11.6*  --      --     140   POTASSIUM 4.1 4.2   CR 0.49* 0.52   A1C  --  5.9*      Last Comprehensive Metabolic Panel:  Sodium   Date Value Ref Range Status   05/31/2019 140 133 - 144 mmol/L Final     Potassium   Date Value Ref Range Status   05/31/2019 4.1 3.4 - 5.3 mmol/L Final     Chloride   Date Value Ref Range Status   05/31/2019 104 94 - 109 mmol/L Final     Carbon Dioxide   Date Value Ref Range Status   05/31/2019 29 20 - 32 mmol/L Final     Anion Gap   Date Value Ref Range Status   05/31/2019 7 3 - 14 mmol/L Final     Glucose   Date Value Ref Range Status   05/31/2019 99 70 - 99 mg/dL Final     Comment:     Non Fasting     Urea Nitrogen   Date Value Ref Range Status   05/31/2019 14 7 - 30 mg/dL Final     Creatinine   Date Value Ref Range Status   05/31/2019 0.49 (L) 0.52 - 1.04 mg/dL Final     GFR Estimate   Date Value Ref Range Status   05/31/2019 >90 >60 mL/min/[1.73_m2] Final     Comment:     Non  GFR Calc  Starting 12/18/2018, serum creatinine based estimated GFR (eGFR) will be   calculated using the Chronic Kidney Disease Epidemiology Collaboration   (CKD-EPI) equation.       Calcium   Date Value Ref Range Status   05/31/2019 8.6 8.5 - 10.1 mg/dL Final         IMPRESSION:   Reason for surgery/procedure: EGD and colonscopy with MAC for chronic diarrhea  Diagnosis/reason for consult: pre procedure exam      Recommend acetominphen for pain, avoid NSAID, ASA for now  Lasix restart  20 mg daily, check bmp    The proposed surgical procedure is considered LOW risk.    REVISED CARDIAC RISK INDEX  The patient has the following serious cardiovascular risks for perioperative complications such as (MI, PE, VFib and 3  AV Block):  No serious cardiac risks  INTERPRETATION: 0 risks: Class I (very low risk - 0.4% complication rate)    The patient has the following additional risks for perioperative complications:  No identified additional risks  The 10-year ASCVD risk score (Balwinder TORRES Jr., et al., 2013) is: 23.9%    Values used to calculate the score:      Age: 73 years      Sex: Female      Is Non- : No      Diabetic: No      Tobacco smoker: No      Systolic Blood Pressure: 153 mmHg      Is BP treated: Yes      HDL Cholesterol: 80 mg/dL      Total Cholesterol: 246 mg/dL        RECOMMENDATIONS:     Recommend acetominphen for pain, avoid NSAID, ASA for now  Restart Lasix  20 mg daily, check basic metabolic panel in prior to next visit in after procedure    To have nursing confirm that patient is taking 25 mg of losartan and 5 mg amlodipine--which are WHOLE tablets now.    --Patient is to take all scheduled medications on the day of surgery EXCEPT for modifications listed below.    ACE Inhibitor or Angiotensin Receptor Blocker (ARB) Use  Ace inhibitor or Angiotensin Receptor Blocker (ARB)  should HOLD this medication for the 24 hours prior to surgery.    APPROVAL GIVEN to proceed with proposed procedure, without further diagnostic evaluation       Signed Electronically by: Stewart Stevens MD    Copy of this evaluation report is provided to requesting physician.    [facility] Preop Guidelines    Revised Cardiac Risk Index

## 2019-06-13 LAB
INTERPRETATION ECG - MUSE: NORMAL
O+P STL MICRO: NORMAL
O+P STL MICRO: NORMAL
SPECIMEN SOURCE: NORMAL

## 2019-06-14 ENCOUNTER — TELEPHONE (OUTPATIENT)
Dept: OBGYN | Facility: CLINIC | Age: 74
End: 2019-06-14

## 2019-06-14 NOTE — TELEPHONE ENCOUNTER
Patient requesting AVS and EKG results from pre-op be sent to her email address. Routing to Dr. Stevens to inform triage when note is complete  [email or web address]

## 2019-06-20 NOTE — NURSING NOTE
Spoke to Lashell via TC, she is taking whole tablets now of her b/p medication and aware to hold losartan on day of colonoscopy. She also will call back to schedule ofv with Dr Stevens for after colonoscopy and after getting ordered lab work.Pt indicated understanding and agreed with plan.

## 2019-07-03 ASSESSMENT — MIFFLIN-ST. JEOR: SCORE: 1267.92

## 2019-07-09 ENCOUNTER — ANESTHESIA EVENT (OUTPATIENT)
Dept: SURGERY | Facility: AMBULATORY SURGERY CENTER | Age: 74
End: 2019-07-09

## 2019-07-09 RX ORDER — FENTANYL CITRATE 50 UG/ML
25-50 INJECTION, SOLUTION INTRAMUSCULAR; INTRAVENOUS
Status: CANCELLED | OUTPATIENT
Start: 2019-07-09

## 2019-07-09 RX ORDER — OXYCODONE HYDROCHLORIDE 5 MG/1
5-10 TABLET ORAL EVERY 4 HOURS PRN
Status: CANCELLED | OUTPATIENT
Start: 2019-07-09

## 2019-07-10 ENCOUNTER — HOSPITAL ENCOUNTER (OUTPATIENT)
Facility: AMBULATORY SURGERY CENTER | Age: 74
Discharge: HOME | End: 2019-07-10
Attending: INTERNAL MEDICINE | Admitting: INTERNAL MEDICINE
Payer: COMMERCIAL

## 2019-07-10 ENCOUNTER — SURGERY (OUTPATIENT)
Age: 74
End: 2019-07-10
Payer: COMMERCIAL

## 2019-07-10 ENCOUNTER — ANESTHESIA (OUTPATIENT)
Dept: SURGERY | Facility: AMBULATORY SURGERY CENTER | Age: 74
End: 2019-07-10
Payer: COMMERCIAL

## 2019-07-10 VITALS
SYSTOLIC BLOOD PRESSURE: 138 MMHG | TEMPERATURE: 98.9 F | DIASTOLIC BLOOD PRESSURE: 86 MMHG | WEIGHT: 168 LBS | HEIGHT: 65 IN | RESPIRATION RATE: 16 BRPM | OXYGEN SATURATION: 97 % | BODY MASS INDEX: 27.99 KG/M2

## 2019-07-10 DIAGNOSIS — K52.9 CHRONIC DIARRHEA: Primary | ICD-10-CM

## 2019-07-10 LAB
COLONOSCOPY: NORMAL
UPPER GI ENDOSCOPY: NORMAL

## 2019-07-10 PROCEDURE — 88305 TISSUE EXAM BY PATHOLOGIST: CPT | Performed by: INTERNAL MEDICINE

## 2019-07-10 PROCEDURE — 45381 COLONOSCOPY SUBMUCOUS NJX: CPT

## 2019-07-10 PROCEDURE — 45381 COLONOSCOPY SUBMUCOUS NJX: CPT | Mod: 51 | Performed by: INTERNAL MEDICINE

## 2019-07-10 PROCEDURE — 45380 COLONOSCOPY AND BIOPSY: CPT | Mod: XS

## 2019-07-10 PROCEDURE — 43239 EGD BIOPSY SINGLE/MULTIPLE: CPT | Mod: 51 | Performed by: INTERNAL MEDICINE

## 2019-07-10 PROCEDURE — 45380 COLONOSCOPY AND BIOPSY: CPT | Mod: 59 | Performed by: INTERNAL MEDICINE

## 2019-07-10 PROCEDURE — G8907 PT DOC NO EVENTS ON DISCHARG: HCPCS

## 2019-07-10 PROCEDURE — 45385 COLONOSCOPY W/LESION REMOVAL: CPT | Performed by: INTERNAL MEDICINE

## 2019-07-10 PROCEDURE — 43239 EGD BIOPSY SINGLE/MULTIPLE: CPT

## 2019-07-10 PROCEDURE — G8918 PT W/O PREOP ORDER IV AB PRO: HCPCS

## 2019-07-10 PROCEDURE — 45385 COLONOSCOPY W/LESION REMOVAL: CPT

## 2019-07-10 RX ORDER — HYDROMORPHONE HYDROCHLORIDE 1 MG/ML
.3-.5 INJECTION, SOLUTION INTRAMUSCULAR; INTRAVENOUS; SUBCUTANEOUS EVERY 10 MIN PRN
Status: DISCONTINUED | OUTPATIENT
Start: 2019-07-10 | End: 2019-07-11 | Stop reason: HOSPADM

## 2019-07-10 RX ORDER — ONDANSETRON 4 MG/1
4 TABLET, ORALLY DISINTEGRATING ORAL EVERY 30 MIN PRN
Status: DISCONTINUED | OUTPATIENT
Start: 2019-07-10 | End: 2019-07-11 | Stop reason: HOSPADM

## 2019-07-10 RX ORDER — MEPERIDINE HYDROCHLORIDE 25 MG/ML
12.5 INJECTION INTRAMUSCULAR; INTRAVENOUS; SUBCUTANEOUS
Status: DISCONTINUED | OUTPATIENT
Start: 2019-07-10 | End: 2019-07-11 | Stop reason: HOSPADM

## 2019-07-10 RX ORDER — PROPOFOL 10 MG/ML
INJECTION, EMULSION INTRAVENOUS PRN
Status: DISCONTINUED | OUTPATIENT
Start: 2019-07-10 | End: 2019-07-10

## 2019-07-10 RX ORDER — FENTANYL CITRATE 50 UG/ML
25-50 INJECTION, SOLUTION INTRAMUSCULAR; INTRAVENOUS
Status: DISCONTINUED | OUTPATIENT
Start: 2019-07-10 | End: 2019-07-11 | Stop reason: HOSPADM

## 2019-07-10 RX ORDER — ONDANSETRON 2 MG/ML
4 INJECTION INTRAMUSCULAR; INTRAVENOUS
Status: DISCONTINUED | OUTPATIENT
Start: 2019-07-10 | End: 2019-07-11 | Stop reason: HOSPADM

## 2019-07-10 RX ORDER — ALBUTEROL SULFATE 0.83 MG/ML
2.5 SOLUTION RESPIRATORY (INHALATION) EVERY 4 HOURS PRN
Status: DISCONTINUED | OUTPATIENT
Start: 2019-07-10 | End: 2019-07-11 | Stop reason: HOSPADM

## 2019-07-10 RX ORDER — SODIUM CHLORIDE, SODIUM LACTATE, POTASSIUM CHLORIDE, CALCIUM CHLORIDE 600; 310; 30; 20 MG/100ML; MG/100ML; MG/100ML; MG/100ML
INJECTION, SOLUTION INTRAVENOUS CONTINUOUS
Status: DISCONTINUED | OUTPATIENT
Start: 2019-07-10 | End: 2019-07-11 | Stop reason: HOSPADM

## 2019-07-10 RX ORDER — ONDANSETRON 2 MG/ML
4 INJECTION INTRAMUSCULAR; INTRAVENOUS EVERY 30 MIN PRN
Status: DISCONTINUED | OUTPATIENT
Start: 2019-07-10 | End: 2019-07-11 | Stop reason: HOSPADM

## 2019-07-10 RX ORDER — LIDOCAINE HYDROCHLORIDE 20 MG/ML
INJECTION, SOLUTION INFILTRATION; PERINEURAL PRN
Status: DISCONTINUED | OUTPATIENT
Start: 2019-07-10 | End: 2019-07-10

## 2019-07-10 RX ORDER — LIDOCAINE 40 MG/G
CREAM TOPICAL
Status: DISCONTINUED | OUTPATIENT
Start: 2019-07-10 | End: 2019-07-11 | Stop reason: HOSPADM

## 2019-07-10 RX ORDER — PROPOFOL 10 MG/ML
INJECTION, EMULSION INTRAVENOUS CONTINUOUS PRN
Status: DISCONTINUED | OUTPATIENT
Start: 2019-07-10 | End: 2019-07-10

## 2019-07-10 RX ORDER — CHOLESTYRAMINE 4 G/9G
4 POWDER, FOR SUSPENSION ORAL DAILY
Qty: 360 G | Refills: 3 | Status: SHIPPED | OUTPATIENT
Start: 2019-07-10 | End: 2020-08-14

## 2019-07-10 RX ORDER — NALOXONE HYDROCHLORIDE 0.4 MG/ML
.1-.4 INJECTION, SOLUTION INTRAMUSCULAR; INTRAVENOUS; SUBCUTANEOUS
Status: DISCONTINUED | OUTPATIENT
Start: 2019-07-10 | End: 2019-07-11 | Stop reason: HOSPADM

## 2019-07-10 RX ORDER — DEXAMETHASONE SODIUM PHOSPHATE 4 MG/ML
4 INJECTION, SOLUTION INTRA-ARTICULAR; INTRALESIONAL; INTRAMUSCULAR; INTRAVENOUS; SOFT TISSUE EVERY 10 MIN PRN
Status: DISCONTINUED | OUTPATIENT
Start: 2019-07-10 | End: 2019-07-11 | Stop reason: HOSPADM

## 2019-07-10 RX ADMIN — PROPOFOL 100 MG: 10 INJECTION, EMULSION INTRAVENOUS at 09:46

## 2019-07-10 RX ADMIN — LIDOCAINE HYDROCHLORIDE 100 MG: 20 INJECTION, SOLUTION INFILTRATION; PERINEURAL at 09:46

## 2019-07-10 RX ADMIN — PROPOFOL 150 MCG/KG/MIN: 10 INJECTION, EMULSION INTRAVENOUS at 09:47

## 2019-07-10 RX ADMIN — SODIUM CHLORIDE, SODIUM LACTATE, POTASSIUM CHLORIDE, CALCIUM CHLORIDE: 600; 310; 30; 20 INJECTION, SOLUTION INTRAVENOUS at 08:40

## 2019-07-10 NOTE — ANESTHESIA POSTPROCEDURE EVALUATION
Anesthesia POST Procedure Evaluation    Patient: Lashell Hogue   MRN:     [id number] Gender:   female   Age:    73 year old :      1945        Preoperative Diagnosis: MAC double per Ramy; [facility]; Dr. Stewart Stevens, primary   Procedure(s):  COLONOSCOPY, WITH CO2 INSUFFLATION  ESOPHAGOGASTRODUODENOSCOPY, WITH CO2 INSUFFLATION  Esophagogastroduodenoscopy, With Biopsy  Colonoscopy, Flexible, With Lesion Removal Using Snare  Tattooing, With Colonoscopy   Postop Comments: No value filed.       Anesthesia Type:  MAC  MAC    Reportable Event: NO     PAIN: Uncomplicated   Sign Out status: Comfortable, Well controlled pain     PONV: No PONV   Sign Out status:  No Nausea or Vomiting     Neuro/Psych: Uneventful perioperative course   Sign Out Status: Preoperative baseline; Age appropriate mentation     Airway/Resp.: Uneventful perioperative course   Sign Out Status: Non labored breathing, age appropriate RR; Resp. Status within EXPECTED Parameters     CV: Uneventful perioperative course   Sign Out status: Appropriate BP and perfusion indices; Appropriate HR/Rhythm     Disposition:   Sign Out in:  PACU  Disposition:  Phase II; Home  Recovery Course: Uneventful  Follow-Up: Not required           Last Anesthesia Record Vitals:  CRNA VITALS  7/10/2019 1038 - 7/10/2019 1138      7/10/2019             Pulse:  71    SpO2:  99 %          Last PACU Vitals:  Vitals Value Taken Time   BP     Temp     Pulse     Resp     SpO2     Temp src Skin 7/10/2019 11:00 AM   NIBP 123/71 7/10/2019 11:05 AM   Pulse 71 7/10/2019 11:08 AM   SpO2 99 % 7/10/2019 11:08 AM   Resp     Temp 36  C (96.8  F) 7/10/2019 11:02 AM   Ht Rate     Temp 2           Electronically Signed By: Stanley Rogers MD, July 10, 2019, 2:08 PM

## 2019-07-10 NOTE — ANESTHESIA CARE TRANSFER NOTE
Patient: Lashell Hogue    Procedure(s):  COLONOSCOPY, WITH CO2 INSUFFLATION  ESOPHAGOGASTRODUODENOSCOPY, WITH CO2 INSUFFLATION  Esophagogastroduodenoscopy, With Biopsy  Colonoscopy, Flexible, With Lesion Removal Using Snare    Diagnosis: MAC double per Ramy; [facility]; Dr. Stewart Stevens, primary  Diagnosis Additional Information: No value filed.    Anesthesia Type:   MAC     Note:  Airway :Room Air  Patient transferred to:Phase II  Comments: To Phase II. Report to RN.  VSS Resp status stable.Handoff Report: Identifed the Patient, Identified the Reponsible Provider, Reviewed the pertinent medical history, Discussed the surgical course, Reviewed Intra-OP anesthesia mangement and issues during anesthesia, Set expectations for post-procedure period and Allowed opportunity for questions and acknowledgement of understanding      Vitals: (Last set prior to Anesthesia Care Transfer)    CRNA VITALS  7/10/2019 1038 - 7/10/2019 1112      7/10/2019             Pulse:  71    SpO2:  99 %                Electronically Signed By: DONNA Diego CRNA  July 10, 2019  11:12 AM

## 2019-07-10 NOTE — ANESTHESIA PREPROCEDURE EVALUATION
Anesthesia Pre-Procedure Evaluation    Patient: Lashell Hogue   MRN:     [id number] Gender:   female   Age:    73 year old :      1945        Preoperative Diagnosis: MAC double per Ramy; [facility]; Dr. Stewart Stevens, primary   Procedure(s):  COLONOSCOPY, WITH CO2 INSUFFLATION  ESOPHAGOGASTRODUODENOSCOPY, WITH CO2 INSUFFLATION     Past Medical History:   Diagnosis Date     Chronic diarrhea      Hypertension       Past Surgical History:   Procedure Laterality Date     ABDOMEN SURGERY       COLONOSCOPY       ENHANCE LASER REFRACTIVE EXISTING PT OUTSIDE PARAMETERS Right 3/13/2017    Procedure: ENHANCE LASER REFRACTIVE EXISTING PT OUTSIDE PARAMETERS;  Surgeon: Bong Guerrreo MD;  Location: [facility]     HERNIA REPAIR       PHACOEMULSIFICATION CLEAR CORNEA WITH TORIC INTRAOCULAR LENS IMPLANT Right 2015    Procedure: PHACOEMULSIFICATION CLEAR CORNEA WITH TORIC INTRAOCULAR LENS IMPLANT;  Surgeon: Bong Guerrero MD;  Location: [facility]     PHACOEMULSIFICATION CLEAR CORNEA WITH TORIC INTRAOCULAR LENS IMPLANT Left 2015    Procedure: PHACOEMULSIFICATION CLEAR CORNEA WITH TORIC INTRAOCULAR LENS IMPLANT;  Surgeon: Bong Guerrero MD;  Location: [facility]     WAVESCAN SCREENING Right 3/13/2017    Procedure: WAVESCAN SCREENING;  Surgeon: Bong Guerrero MD;  Location: [facility]          Anesthesia Evaluation     .             ROS/MED HX    ENT/Pulmonary:  - neg pulmonary ROS     Neurologic:  - neg neurologic ROS     Cardiovascular:  - neg cardiovascular ROS   (+) Dyslipidemia, hypertension----. : . . . :. .       METS/Exercise Tolerance:     Hematologic:  - neg hematologic  ROS       Musculoskeletal:  - neg musculoskeletal ROS       GI/Hepatic:  - neg GI/hepatic ROS   (+) GERD       Renal/Genitourinary:  - ROS Renal section negative       Endo:  - neg endo ROS       Psychiatric:  - neg psychiatric ROS       Infectious Disease:  - neg infectious disease ROS       Malignancy:      - no malignancy  "  Other:    - neg other ROS                     PHYSICAL EXAM:   Mental Status/Neuro: A/A/O   Airway: Facies: Feasible  Mallampati: I  Mouth/Opening: Full  TM distance: > 6 cm  Neck ROM: Full   Respiratory: Auscultation: CTAB     Resp. Rate: Normal     Resp. Effort: Normal      CV: Rhythm: Regular  Rate: Age appropriate  Heart: Normal Sounds   Comments:      Dental: Normal                  Lab Results   Component Value Date    WBC 4.8 05/31/2019    HGB 11.6 (L) 05/31/2019    HCT 35.4 05/31/2019     05/31/2019     05/31/2019    POTASSIUM 4.1 05/31/2019    CHLORIDE 104 05/31/2019    CO2 29 05/31/2019    BUN 14 05/31/2019    CR 0.49 (L) 05/31/2019    GLC 99 05/31/2019    CARISA 8.6 05/31/2019    ALBUMIN 3.6 05/31/2019    PROTTOTAL 7.2 05/31/2019    ALT 27 05/31/2019    AST 31 05/31/2019    ALKPHOS 67 05/31/2019    BILITOTAL 0.6 05/31/2019    TSH 1.53 03/21/2008       Preop Vitals  BP Readings from Last 3 Encounters:   07/10/19 143/80   06/12/19 153/81   05/31/19 157/87    Pulse Readings from Last 3 Encounters:   06/12/19 70   05/31/19 71   01/09/19 96      Resp Readings from Last 3 Encounters:   07/10/19 16   05/11/15 16   04/27/15 16    SpO2 Readings from Last 3 Encounters:   07/10/19 97%   05/31/19 96%   05/11/15 94%      Temp Readings from Last 1 Encounters:   07/10/19 37.2  C (98.9  F) (Temporal)    Ht Readings from Last 1 Encounters:   07/03/19 1.651 m (5' 5\")      Wt Readings from Last 1 Encounters:   07/03/19 76.2 kg (168 lb)    Estimated body mass index is 27.96 kg/m  as calculated from the following:    Height as of this encounter: 1.651 m (5' 5\").    Weight as of this encounter: 76.2 kg (168 lb).     LDA:  Peripheral IV 07/10/19 Right Upper forearm (Active)   Site Assessment WDL 7/10/2019  8:23 AM   Line Status Saline locked 7/10/2019  8:23 AM   Phlebitis Scale 0-->no symptoms 7/10/2019  8:23 AM   Dressing Intervention New dressing  7/10/2019  8:23 AM   Number of days: 0            Assessment:   ASA " SCORE: 2    NPO Status: > 6 hours since completed Solid Foods   Documentation: H&P complete; Preop Testing complete; Consents complete   Proceeding: Proceed without further delay  Tobacco Use:  NO Active use of Tobacco/UNKNOWN Tobacco use status     Plan:   Anes. Type:  MAC   Pre-Induction: Midazolam IV   Induction:  IV (Standard)   Airway: Native Airway   Access/Monitoring: PIV   Maintenance: Propofol; IV   Emergence: Procedure Site   Logistics: Same Day Surgery     Postop Pain/Sedation Strategy:  Standard-Options: Opioids PRN     PONV Management:  Adult Risk Factors: Female, Non-Smoker, Postop Opioids  Prevention: Propofol Infusion; Ondansetron     CONSENT: Direct conversation   Plan and risks discussed with: Patient   Blood Products: Consent Deferred (Minimal Blood Loss)                         Stanley Rogers MD

## 2019-07-12 LAB — COPATH REPORT: NORMAL

## 2019-08-07 DIAGNOSIS — E78.5 HYPERLIPIDEMIA LDL GOAL <100: ICD-10-CM

## 2019-08-07 RX ORDER — ATORVASTATIN CALCIUM 10 MG/1
10 TABLET, FILM COATED ORAL DAILY
Qty: 30 TABLET | Refills: 0 | Status: SHIPPED | OUTPATIENT
Start: 2019-08-07 | End: 2019-08-21

## 2019-08-07 NOTE — TELEPHONE ENCOUNTER
Received refill request for lipitor.  Last in clinic for annual 1/2019 where this was started.    Spoke with Dr. Stevens who recommended clinic appointment and 30 day refill.    Spoke with Lashell and gave her this information. She agreed with plan and was forwarded to  to schedule.

## 2019-08-20 ENCOUNTER — OFFICE VISIT (OUTPATIENT)
Dept: GASTROENTEROLOGY | Facility: CLINIC | Age: 74
End: 2019-08-20
Payer: COMMERCIAL

## 2019-08-20 VITALS
OXYGEN SATURATION: 96 % | HEART RATE: 72 BPM | DIASTOLIC BLOOD PRESSURE: 90 MMHG | WEIGHT: 159.8 LBS | HEIGHT: 65 IN | SYSTOLIC BLOOD PRESSURE: 137 MMHG | BODY MASS INDEX: 26.62 KG/M2

## 2019-08-20 DIAGNOSIS — K22.719 BARRETT'S ESOPHAGUS WITH DYSPLASIA: ICD-10-CM

## 2019-08-20 DIAGNOSIS — Z86.0100 HISTORY OF COLONIC POLYPS: ICD-10-CM

## 2019-08-20 DIAGNOSIS — D17.5 LIPOMA OF SMALL INTESTINE: Primary | ICD-10-CM

## 2019-08-20 DIAGNOSIS — K63.9 LESION OF SMALL INTESTINE: ICD-10-CM

## 2019-08-20 PROCEDURE — 99214 OFFICE O/P EST MOD 30 MIN: CPT | Performed by: INTERNAL MEDICINE

## 2019-08-20 ASSESSMENT — MIFFLIN-ST. JEOR: SCORE: 1230.73

## 2019-08-20 NOTE — PROGRESS NOTES
GASTROENTEROLOGY FOLLOW UP CLINIC VISIT    CC/REFERRING MD:    Stewart Stevens    REASON FOR CONSULTATION:   Juan Carlos Reynoso* for   Chief Complaint   Patient presents with     RECHECK     Diarrhea three month follow up       HISTORY OF PRESENT ILLNESS:    Lashell Hogue is 73 year old female who presents for follow up of Christy esophagus and diarrhea.  She was previously seen in May 2019 for these issues.  Due to the Christy esophagus and diarrhea and history of polyps EGD and colonoscopy were recommended.  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  Biopsies did not reveal any dysplasia.  She notes that she did start PPI therapy after that time but did discontinue it due to concerns for dementia.  During the upper endoscopy, a 2 cm lesion was noted in the duodenum which endoscopically appeared to be a possible lipoma.  The colonoscopy did reveal several polyps which were removed and these were adenomas.  Random biopsies did not show any microscopic colitis.  She notes that she is taking cholestyramine once daily and in addition is taking fiber and this has helped the loose stools.  She has not seen any blood in her stool.  She is not having abdominal pain.  She is not having any other issues at this point.      PERTINENT PAST MEDICAL HISTORY:    Past Medical History:   Diagnosis Date     Chronic diarrhea      Hypertension        PREVIOUS SURGERIES:   Past Surgical History:   Procedure Laterality Date     ABDOMEN SURGERY       COLONOSCOPY       COLONOSCOPY WITH CO2 INSUFFLATION N/A 7/10/2019    Procedure: COLONOSCOPY, WITH CO2 INSUFFLATION;  Surgeon: Juan Carlos Mccann MD;  Location:  OR     COMBINED ESOPHAGOSCOPY, GASTROSCOPY, DUODENOSCOPY (EGD) WITH CO2 INSUFFLATION N/A 7/10/2019    Procedure: ESOPHAGOGASTRODUODENOSCOPY, WITH CO2 INSUFFLATION;  Surgeon: Juan Carlos Mccann MD;  Location:  OR     ENHANCE LASER REFRACTIVE  EXISTING PT OUTSIDE PARAMETERS Right 3/13/2017    Procedure: ENHANCE LASER REFRACTIVE EXISTING PT OUTSIDE PARAMETERS;  Surgeon: Bong Guerrero MD;  Location: [facility]     ESOPHAGOSCOPY, GASTROSCOPY, DUODENOSCOPY (EGD), COMBINED N/A 7/10/2019    Procedure: Esophagogastroduodenoscopy, With Biopsy;  Surgeon: Juan Carlos Mccann MD;  Location: [facility]     HERNIA REPAIR       PHACOEMULSIFICATION CLEAR CORNEA WITH TORIC INTRAOCULAR LENS IMPLANT Right 4/27/2015    Procedure: PHACOEMULSIFICATION CLEAR CORNEA WITH TORIC INTRAOCULAR LENS IMPLANT;  Surgeon: Bong Guerrero MD;  Location: [facility]     PHACOEMULSIFICATION CLEAR CORNEA WITH TORIC INTRAOCULAR LENS IMPLANT Left 5/11/2015    Procedure: PHACOEMULSIFICATION CLEAR CORNEA WITH TORIC INTRAOCULAR LENS IMPLANT;  Surgeon: Bong Guerrero MD;  Location: [facility]     WAVESCAN SCREENING Right 3/13/2017    Procedure: WAVESCAN SCREENING;  Surgeon: Bong Guerrero MD;  Location: [facility]       ALLERGIES:     Allergies   Allergen Reactions     Ciprofloxacin      Patient states just got sick        PERTINENT MEDICATIONS:    Current Outpatient Medications:      amLODIPine (NORVASC) 5 MG tablet, Take 1 tablet (5 mg) by mouth daily (Patient taking differently: Take 5 mg by mouth daily 2.5mg tab daily), Disp: 90 tablet, Rfl: 2     atorvastatin (LIPITOR) 10 MG tablet, Take 1 tablet (10 mg) by mouth daily, Disp: 30 tablet, Rfl: 0     Biotin w/ Vitamins C & E (HAIR/SKIN/NAILS PO), , Disp: , Rfl:      cholestyramine (QUESTRAN) 4 GM/DOSE powder, Take 4 g by mouth daily, Disp: 360 g, Rfl: 3     cod liver oil CAPS capsule, , Disp: , Rfl:      furosemide (LASIX) 20 MG tablet, Take 1 tablet (20 mg) by mouth daily, Disp: 90 tablet, Rfl: 0     furosemide (LASIX) 20 MG tablet, Take 20 mg by mouth daily, Disp: , Rfl:      Loperamide HCl (IMODIUM OR), , Disp: , Rfl:      losartan (COZAAR) 25 MG tablet, Take 1 tablet (25 mg) by mouth daily . DOSE CHANGE. (Patient taking differently: Take 25 mg by  "mouth daily Taking 1/2 tab), Disp: 90 tablet, Rfl: 1     multivitamin  with lutein (OCUVITE WITH LTEIN) CAPS, Take 1 capsule by mouth daily, Disp: , Rfl:      psyllium (METAMUCIL) 28.3 % packet, Take 1 packet by mouth daily, Disp: , Rfl:      BIOTIN PO, , Disp: , Rfl:      Multiple Vitamins-Minerals (MULTIVITAL PO), , Disp: , Rfl:      vitamin D3 (CHOLECALCIFEROL) 1000 units (25 mcg) tablet, Take by mouth daily, Disp: , Rfl:     FAMILY HISTORY:   No family history on file.       ROS:    No fevers or chills  No weight loss  No blurry vision, double vision or change in vision  No sore throat  No lymphadenopathy  No headache, paraesthesias, or weakness in a limb  No shortness of breath or wheezing  No chest pain or pressure  No arthralgias or myalgias  No rashes or skin changes  No odynophagia or dysphagia  No BRBPR, hematochezia, melena  No dysuria, frequency or urgency  No hot/cold intolerance or polyria  No anxiety or depression  PHYSICAL EXAMINATION:  Constitutional: aaox3, cooperative, pleasant, not dyspneic/diaphoretic, no acute distress  Vitals reviewed: BP (!) 137/90   Pulse 72   Ht 1.651 m (5' 5\")   Wt 72.5 kg (159 lb 12.8 oz)   SpO2 96%   BMI 26.59 kg/m    Wt:   Wt Readings from Last 2 Encounters:   08/20/19 72.5 kg (159 lb 12.8 oz)   07/03/19 76.2 kg (168 lb)      Eyes: Sclera anicteric/injected  Ears/nose/mouth/throat: Normal oropharynx without ulcers or exudate, mucus membranes moist, hearing intact  Neck: supple, thyroid normal size  CV: No edema  Respiratory: Unlabored breathing  Lymph: No submandibular, supraclavicular or inguinal lymphadenopathy  Abd: Nondistended, no masses, +bs, no hepatosplenomegaly, nontender, no peritoneal signs  Skin: warm, perfused, no jaundice  Psych: Normal affect  MSK: Normal gait      PERTINENT STUDIES:   Most recent CBC:  Recent Labs   Lab Test 05/31/19  0925   WBC 4.8   HGB 11.6*   HCT 35.4        Most recent hepatic panel:  Recent Labs   Lab Test " 05/31/19  0925   ALT 27   AST 31     Most recent creatinine:  Recent Labs   Lab Test 05/31/19  0925 12/12/18  1235   CR 0.49* 0.52         ASSESSMENT/PLAN:    Lashell Hogue is a 73 year old female who presents for follow up of Christy esophagus, duodenal subepithelial lesion, and loose stools.    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  However, we should plan to continue a surveillance protocol and so I recommend a repeat endoscopy in 3 years.  In addition, I do recommend that she take low-dose PPI therapy given this has been shown to prevent progression of dysplasia.  We did discuss that the literature on side effects is mixed and that one study did have a link to dementia however on a subsequent study this was not confirmed.  Therefore, I do think that the benefit does outweigh the risk for her.    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  She notes that she does have a history of bowel obstruction.  I did review her previous endoscopy reports and as recently as 2016, there was no mention of any small intestine lipoma on upper endoscopy.  Therefore I think it is reasonable to ensure that this lesion is not growing and confirm that it is a lipoma.  Therefore we will plan for an EGD with endoscopic ultrasound in approximately 6 months for further evaluation of this issue.  If this is not a lipoma, we can sample it or potentially resected if indicated.    Chronic diarrhea: She did not have any signs of inflammatory bowel disease on her prior colonoscopy and biopsies were negative for microscopic colitis.  I think this is most likely postsurgical after her sigmoid resection.  She should continue to use cholestyramine once daily along with fiber.    History of colon polyps: She had multiple  adenomas noted at the time the last colonoscopy.  I recommend a repeat colonoscopy in 3 years which can be done at the same time as her upper endoscopy for Christy's surveillance.      RTC 1 year    Thank you for this consultation.  It was a pleasure to participate in the care of this patient; please contact us with any further questions.      This note was created with voice recognition software, and while reviewed for accuracy, typos may remain.     Juan Carlos Mccann MD  Adjunct  of Medicine  Division of Gastroenterology, Hepatology and Nutrition  [facility]  [phone]

## 2019-08-20 NOTE — NURSING NOTE
"Lashell Hogue's goals for this visit include:   Chief Complaint   Patient presents with     RECHECK     Diarrhea three month follow up       She requests these members of her care team be copied on today's visit information: yes    PCP: Stewart Stevens    Referring Provider:  Juan Carlos Mccann MD  [facility] [place]  [address]  [facility] [place] [address]    BP (!) 137/90   Pulse 72   Ht 1.651 m (5' 5\")   Wt 72.5 kg (159 lb 12.8 oz)   SpO2 96%   BMI 26.59 kg/m      Do you need any medication refills at today's visit? No    Queta Bashir CMA      "

## 2019-08-21 ENCOUNTER — OFFICE VISIT (OUTPATIENT)
Dept: FAMILY MEDICINE | Facility: CLINIC | Age: 74
End: 2019-08-21
Attending: FAMILY MEDICINE
Payer: COMMERCIAL

## 2019-08-21 VITALS
SYSTOLIC BLOOD PRESSURE: 136 MMHG | DIASTOLIC BLOOD PRESSURE: 76 MMHG | HEIGHT: 65 IN | BODY MASS INDEX: 26.57 KG/M2 | WEIGHT: 159.5 LBS | HEART RATE: 71 BPM

## 2019-08-21 DIAGNOSIS — E78.2 MIXED HYPERLIPIDEMIA: ICD-10-CM

## 2019-08-21 DIAGNOSIS — L98.9 SKIN LESION: Primary | ICD-10-CM

## 2019-08-21 DIAGNOSIS — D64.9 ANEMIA, UNSPECIFIED TYPE: ICD-10-CM

## 2019-08-21 DIAGNOSIS — R73.03 PREDIABETES: ICD-10-CM

## 2019-08-21 DIAGNOSIS — E78.5 HYPERLIPIDEMIA LDL GOAL <100: ICD-10-CM

## 2019-08-21 PROCEDURE — G0463 HOSPITAL OUTPT CLINIC VISIT: HCPCS | Mod: ZF

## 2019-08-21 RX ORDER — ATORVASTATIN CALCIUM 10 MG/1
10 TABLET, FILM COATED ORAL DAILY
Qty: 30 TABLET | Refills: 6 | Status: SHIPPED | OUTPATIENT
Start: 2019-08-21 | End: 2020-05-15

## 2019-08-21 ASSESSMENT — MIFFLIN-ST. JEOR: SCORE: 1229.37

## 2019-08-21 ASSESSMENT — ANXIETY QUESTIONNAIRES
7. FEELING AFRAID AS IF SOMETHING AWFUL MIGHT HAPPEN: MORE THAN HALF THE DAYS
2. NOT BEING ABLE TO STOP OR CONTROL WORRYING: MORE THAN HALF THE DAYS
3. WORRYING TOO MUCH ABOUT DIFFERENT THINGS: NEARLY EVERY DAY
6. BECOMING EASILY ANNOYED OR IRRITABLE: MORE THAN HALF THE DAYS
5. BEING SO RESTLESS THAT IT IS HARD TO SIT STILL: NOT AT ALL
GAD7 TOTAL SCORE: 11
1. FEELING NERVOUS, ANXIOUS, OR ON EDGE: SEVERAL DAYS

## 2019-08-21 ASSESSMENT — PATIENT HEALTH QUESTIONNAIRE - PHQ9
SUM OF ALL RESPONSES TO PHQ QUESTIONS 1-9: 11
5. POOR APPETITE OR OVEREATING: SEVERAL DAYS

## 2019-08-21 ASSESSMENT — ENCOUNTER SYMPTOMS
FEVER: 0
NIGHT SWEATS: 0
DYSPNEA ON EXERTION: 0

## 2019-08-21 NOTE — LETTER
Date:August 29, 2019      Patient was self referred, no letter generated. Do not send.        [facility] Information

## 2019-08-21 NOTE — PROGRESS NOTES
"HPI  Pt. Here for follow up HTN, pre DM and lipids    1. HTN   She continues continues on the 2.5 mg amlodipine and 25 mg losartan.   She is on a diet and has lost weight--down 10# in 2 months  She is cutting out sugar, eating veggies, lower carbs--really started a week ago  She continues on Lasix 20 mg on average 3-4 days/week, sometimes take only 1/2 tab for edema  K+ 5/31 4.1    2. Pre DM--HgbA1c 5.9 in 12/2019. Has now switched diet as above, losing weight. Repeat lab in December  3. Lipids--continues on Lipitor 10 mg, tolerating.   5. 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 Had polyps on colonscopy  Mild anemia, normochromic, normocytic, 11.6 hgb, hematocrit 35.4  Repeat CBC in Dec.     Review of Systems     Constitutional:  Negative for fever and night sweats.   Respiratory:   Negative for dyspnea on exertion.    Cardiovascular:  Negative for chest pain and dyspnea on exertion.   occ muscle cramps with Lasix  Recurrent skin lesions with bleeding x years    Physical Exam   Constitutional: She appears well-developed and well-nourished.   Cardiovascular: Normal rate, regular rhythm and normal heart sounds.   Pulmonary/Chest: Effort normal and breath sounds normal.   skin forearms--multiple superficial ulcerations, scale    Blood pressure 136/76, pulse 71, height 1.651 m (5' 5\"), weight 72.3 kg (159 lb 8 oz), not currently breastfeeding.      1. HTN--controlled on current regimen. Discussed future with patient, If bp 120/70's or less, consider discontinue  one medication  Continue diet  2. Mild anemia. Counseled patient on possible causes; will repeat CBC before next visit  3. Skin lesions--refer to dermatology    Follow-up Dec.(4 months)        "

## 2019-08-21 NOTE — LETTER
"8/21/2019       RE: Lashell Hogue  [id number]  [facility] [facility] [address]     Dear Colleague,    Thank you for referring your patient, Lashell Hogue, to the [facility] at [facility]. Please see a copy of my visit note below.    HPI  Pt. Here for follow up HTN, pre DM and lipids    1. HTN   She continues continues on the 2.5 mg amlodipine and 25 mg losartan.   She is on a diet and has lost weight--down 10# in 2 months  She is cutting out sugar, eating veggies, lower carbs--really started a week ago  She continues on Lasix 20 mg on average 3-4 days/week, sometimes take only 1/2 tab for edema  K+ 5/31 4.1    2. Pre DM--HgbA1c 5.9 in 12/2019. Has now switched diet as above, losing weight. Repeat lab in December  3. Lipids--continues on Lipitor 10 mg, tolerating.   5. 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 Had polyps on colonscopy  Mild anemia, normochromic, normocytic, 11.6 hgb, hematocrit 35.4  Repeat CBC in Dec.     Review of Systems     Constitutional:  Negative for fever and night sweats.   Respiratory:   Negative for dyspnea on exertion.    Cardiovascular:  Negative for chest pain and dyspnea on exertion.   occ muscle cramps with Lasix  Recurrent skin lesions with bleeding x years    Physical Exam   Constitutional: She appears well-developed and well-nourished.   Cardiovascular: Normal rate, regular rhythm and normal heart sounds.   Pulmonary/Chest: Effort normal and breath sounds normal.   skin forearms--multiple superficial ulcerations, scale    Blood pressure 136/76, pulse 71, height 1.651 m (5' 5\"), weight 72.3 kg (159 lb 8 oz), not currently breastfeeding.      1. HTN--controlled on current regimen. Discussed future with patient, If bp 120/70's or less, consider discontinue  one medication  Continue diet  2. Mild anemia. Counseled patient on possible causes; will repeat CBC before next visit  3. " Skin lesions--refer to dermatology    Follow-up Dec.(4 months)          Again, thank you for allowing me to participate in the care of your patient.      Sincerely,    Stewart Stevens MD

## 2019-08-22 ASSESSMENT — ANXIETY QUESTIONNAIRES: GAD7 TOTAL SCORE: 11

## 2019-08-27 ENCOUNTER — TELEPHONE (OUTPATIENT)
Dept: OBGYN | Facility: CLINIC | Age: 74
End: 2019-08-27

## 2019-08-27 NOTE — TELEPHONE ENCOUNTER
Discussed dermatology referral and misspelling on AVS, advised pt can schedule with SIDDHARTHA FOFANA. Pt expressed understanding and agrees with plan

## 2019-08-27 NOTE — TELEPHONE ENCOUNTER
----- Message from Petty Connelly sent at 8/26/2019 10:05 AM CDT -----  Regarding: Derm Referral; needs to confirm Dr. ortiz  Contact: [phone]  Hilda referred her to Derm - but she thinks she has the wrong doctor listed on her AVS because she can't find this specific doctor.  Please confirm asap as she's supposed to have labs then see Derm.

## 2019-09-23 ENCOUNTER — TRANSFERRED RECORDS (OUTPATIENT)
Dept: HEALTH INFORMATION MANAGEMENT | Facility: CLINIC | Age: 74
End: 2019-09-23

## 2019-09-29 ENCOUNTER — HEALTH MAINTENANCE LETTER (OUTPATIENT)
Age: 74
End: 2019-09-29

## 2019-10-07 ENCOUNTER — DOCUMENTATION ONLY (OUTPATIENT)
Dept: CARE COORDINATION | Facility: CLINIC | Age: 74
End: 2019-10-07

## 2019-10-07 DIAGNOSIS — A63.0 GENITAL WARTS: ICD-10-CM

## 2019-10-07 DIAGNOSIS — I10 ESSENTIAL HYPERTENSION: ICD-10-CM

## 2019-10-07 RX ORDER — IMIQUIMOD 12.5 MG/.25G
CREAM TOPICAL
Qty: 24 PACKET | Refills: 0 | Status: SHIPPED | OUTPATIENT
Start: 2019-10-07 | End: 2020-02-12

## 2019-10-07 RX ORDER — FUROSEMIDE 20 MG
20 TABLET ORAL DAILY
Qty: 90 TABLET | Refills: 1 | Status: SHIPPED | OUTPATIENT
Start: 2019-10-07 | End: 2020-10-01

## 2019-10-07 NOTE — TELEPHONE ENCOUNTER
Received refill request for imiquimod. Was seen recently and referred to dermatology. Sent temporary supply for patient. Also requests furosemide. This was discussed at last minute and to be continued. Rx sent.

## 2019-10-22 ENCOUNTER — OFFICE VISIT (OUTPATIENT)
Dept: DERMATOLOGY | Facility: CLINIC | Age: 74
End: 2019-10-22
Attending: FAMILY MEDICINE
Payer: COMMERCIAL

## 2019-10-22 DIAGNOSIS — L65.9 HAIR THINNING: ICD-10-CM

## 2019-10-22 DIAGNOSIS — L71.9 ROSACEA: ICD-10-CM

## 2019-10-22 DIAGNOSIS — L57.8 DIFFUSE PHOTODAMAGE OF SKIN: Primary | ICD-10-CM

## 2019-10-22 DIAGNOSIS — L82.1 STUCCO KERATOSES: ICD-10-CM

## 2019-10-22 DIAGNOSIS — M67.442 DIGITAL MUCOUS CYST OF FINGER OF LEFT HAND: ICD-10-CM

## 2019-10-22 ASSESSMENT — PAIN SCALES - GENERAL: PAINLEVEL: NO PAIN (0)

## 2019-10-22 NOTE — NURSING NOTE
Dermatology Rooming Note    Lashell Hogue's goals for this visit include:   Chief Complaint   Patient presents with     Derm Problem     Lashell is here for a skin check, states concerning sores on her arms, legs, and back. Ha a cyst on her finger and dry skin as well.        Regi Blue, MAYRAN

## 2019-10-22 NOTE — PROGRESS NOTES
"[facility] Dermatology Note      Dermatology Problem List:  1. Photodamage, xerosis  - continue Amlactin  2. Digital mucous cyst  3. Hair thinning  - start minoxidil 5%    Encounter Date: Oct 22, 2019    CC:   Chief Complaint   Patient presents with     Derm Problem     Lashell is here for a skin check, states concerning sores on her arms, legs, and back. Ha a cyst on her finger and dry skin as well.          History of Present Illness:  Ms. Lashell Hogue is a 74 year old female who presents as a referral from Stewart Stevens.    Has been seen by dermatology at [facility] prior ([place] and [place])    1.  Dry, peeling lips--drinks plenty of water, thinks lower is worse  2.  Reddish streak on left cheek--present for a couple of years, comes and goes, just came back 2 days ago, no triggers  3.  Sores on back, arms and legs--spontaneously bleed, she does scratch, doesn't \"pick\"  4.  Bumps on feet, white and itchy  5.  Extreme dry skin--looks \"like a corpse\", bothered by red and brown blotchiness and thinning of skin  6.  Itchy back  7.  Left hand middle finger--has had many \"cysts\" excised on the fingers in the past.  She is worried it will pop and cause a joint infection.  8.  Hair loss--notes hair thinning without seeing extra hair falling out, primarily on crown of scalp, patchy family history of hair loss.     Past Medical History:   Patient Active Problem List   Diagnosis     Esophageal reflux     Bilateral sensorineural hearing loss     Chronic diarrhea     Christy's esophagus without dysplasia     Benign essential hypertension     Mixed hyperlipidemia     Pre-diabetes     Genital warts     Past Medical History:   Diagnosis Date     Chronic diarrhea      Hypertension      Past Surgical History:   Procedure Laterality Date     ABDOMEN SURGERY       COLONOSCOPY       COLONOSCOPY WITH CO2 INSUFFLATION N/A 7/10/2019    Procedure: COLONOSCOPY, WITH CO2 INSUFFLATION;  Surgeon: Ramy" Juan Carlos Mckoy MD;  Location: [facility]     COMBINED ESOPHAGOSCOPY, GASTROSCOPY, DUODENOSCOPY (EGD) WITH CO2 INSUFFLATION N/A 7/10/2019    Procedure: ESOPHAGOGASTRODUODENOSCOPY, WITH CO2 INSUFFLATION;  Surgeon: Juan Carlos Mccann MD;  Location: [facility] OR     ENHANCE LASER REFRACTIVE EXISTING PT OUTSIDE PARAMETERS Right 3/13/2017    Procedure: ENHANCE LASER REFRACTIVE EXISTING PT OUTSIDE PARAMETERS;  Surgeon: Bong Guerrero MD;  Location: [facility]     ESOPHAGOSCOPY, GASTROSCOPY, DUODENOSCOPY (EGD), COMBINED N/A 7/10/2019    Procedure: Esophagogastroduodenoscopy, With Biopsy;  Surgeon: Juan Carlos Mccann MD;  Location: [facility]     HERNIA REPAIR       PHACOEMULSIFICATION CLEAR CORNEA WITH TORIC INTRAOCULAR LENS IMPLANT Right 4/27/2015    Procedure: PHACOEMULSIFICATION CLEAR CORNEA WITH TORIC INTRAOCULAR LENS IMPLANT;  Surgeon: Bong Guerrero MD;  Location: [facility]     PHACOEMULSIFICATION CLEAR CORNEA WITH TORIC INTRAOCULAR LENS IMPLANT Left 5/11/2015    Procedure: PHACOEMULSIFICATION CLEAR CORNEA WITH TORIC INTRAOCULAR LENS IMPLANT;  Surgeon: Bong Guerrero MD;  Location: [facility]     WAVESCAN SCREENING Right 3/13/2017    Procedure: WAVESCAN SCREENING;  Surgeon: Bong Guerrero MD;  Location: [facility]       Social History:  Patient reports that she has quit smoking. She smoked 0.00 packs per day. She has never used smokeless tobacco. She reports current alcohol use. She reports that she does not use drugs.    Family History:  No family history on file.    Medications:  Current Outpatient Medications   Medication Sig Dispense Refill     amLODIPine (NORVASC) 5 MG tablet Take 1 tablet (5 mg) by mouth daily (Patient taking differently: Take 5 mg by mouth daily 2.5mg tab daily) 90 tablet 2     atorvastatin (LIPITOR) 10 MG tablet Take 1 tablet (10 mg) by mouth daily 30 tablet 6     BIOTIN PO        Biotin w/ Vitamins C & E (HAIR/SKIN/NAILS PO)        cholestyramine (QUESTRAN) 4 GM/DOSE powder Take 4 g by mouth  daily 360 g 3     cod liver oil CAPS capsule        furosemide (LASIX) 20 MG tablet Take 1 tablet (20 mg) by mouth daily 90 tablet 1     furosemide (LASIX) 20 MG tablet Take 20 mg by mouth daily       imiquimod (ALDARA) 5 % external cream Apply topically three times a week 24 packet 0     Loperamide HCl (IMODIUM OR)        losartan (COZAAR) 25 MG tablet Take 1 tablet (25 mg) by mouth daily . DOSE CHANGE. (Patient taking differently: Take 25 mg by mouth daily Taking 1/2 tab) 90 tablet 1     Multiple Vitamins-Minerals (MULTIVITAL PO)        multivitamin  with lutein (OCUVITE WITH LTEIN) CAPS Take 1 capsule by mouth daily       psyllium (METAMUCIL) 28.3 % packet Take 1 packet by mouth daily       vitamin D3 (CHOLECALCIFEROL) 1000 units (25 mcg) tablet Take by mouth daily          Allergies   Allergen Reactions     Ciprofloxacin      Patient states just got sick          Review of Systems:  -As per HPI  -Constitutional: Otherwise feeling well today, in usual state of health.  -HEENT: Patient denies nonhealing oral sores.  -Skin: As above in HPI. No additional skin concerns.    Physical exam:  Vitals: There were no vitals taken for this visit.  GEN: This is a well developed, well-nourished female in no acute distress, in a pleasant mood.    SKIN: Total skin excluding the undergarment areas was performed. The exam included the head/face, neck, both arms, chest, back, abdomen, both legs, digits and/or nails.   -Sanchez skin type: II  -There are fine lines and dyspigmentation on sun exposed areas of the face and chest.  -There are erythematous papules and scattered telangectasias on the bilateral cheeks and nose.   -There is xerosis of the entire body.   -L hand third finger with translucent bluish papule  -No other lesions of concern on areas examined.     Impression/Plan:  1. Rosacea (erythematotelangiectatic)    Amlactin for moisturization    May see cosmetic dermatology    2. Digital mucous cyst    Refer to hand  surgeon if would like    3. Solar lentigines (diffuse photodamage)/xerosis, stucco keratoses    Amlactin    May see cosmetic dermatology    4. Androgenetic alopecia    Start minoxidil 5%    CC Stewart Stevens MD  [address] [place] [address] on close of this encounter.  Follow-up in 3 months, earlier for new or changing lesions.       Dr. Coughlin staffed the patient.    Staff Involved:  Resident(Christopher Fair)/Staff  I, Lelo Coughlin MD, saw this patient with the resident and agree with the resident s findings and plan of care as documented in the resident s note.

## 2019-10-22 NOTE — LETTER
"10/22/2019       RE: Lashell Hogue  [address]  [place] [id number]     Dear Colleague,    Thank you for referring your patient, Lashell Hogue, to the [facility] DERMATOLOGY at [facility]. Please see a copy of my visit note below.    [facility] Dermatology Note      Dermatology Problem List:  1. Photodamage, xerosis  - continue Amlactin  2. Digital mucous cyst  3. Hair thinning  - start minoxidil 5%    Encounter Date: Oct 22, 2019    CC:   Chief Complaint   Patient presents with     Derm Problem     Lashell is here for a skin check, states concerning sores on her arms, legs, and back. Ha a cyst on her finger and dry skin as well.          History of Present Illness:  Ms. Lashell Hogue is a 74 year old female who presents as a referral from Stewart Stevens.    Has been seen by dermatology at [facility] prior ([place] and [place])    1.  Dry, peeling lips--drinks plenty of water, thinks lower is worse  2.  Reddish streak on left cheek--present for a couple of years, comes and goes, just came back 2 days ago, no triggers  3.  Sores on back, arms and legs--spontaneously bleed, she does scratch, doesn't \"pick\"  4.  Bumps on feet, white and itchy  5.  Extreme dry skin--looks \"like a corpse\", bothered by red and brown blotchiness and thinning of skin  6.  Itchy back  7.  Left hand middle finger--has had many \"cysts\" excised on the fingers in the past.  She is worried it will pop and cause a joint infection.  8.  Hair loss--notes hair thinning without seeing extra hair falling out, primarily on crown of scalp, patchy family history of hair loss.     Past Medical History:   Patient Active Problem List   Diagnosis     Esophageal reflux     Bilateral sensorineural hearing loss     Chronic diarrhea     Christy's esophagus without dysplasia     Benign essential hypertension     Mixed hyperlipidemia     Pre-diabetes     Genital " warts     Past Medical History:   Diagnosis Date     Chronic diarrhea      Hypertension      Past Surgical History:   Procedure Laterality Date     ABDOMEN SURGERY       COLONOSCOPY       COLONOSCOPY WITH CO2 INSUFFLATION N/A 7/10/2019    Procedure: COLONOSCOPY, WITH CO2 INSUFFLATION;  Surgeon: Juan Carlos Mccann MD;  [facility]     COMBINED ESOPHAGOSCOPY, GASTROSCOPY, DUODENOSCOPY (EGD) WITH CO2 INSUFFLATION N/A 7/10/2019    Procedure: ESOPHAGOGASTRODUODENOSCOPY, WITH CO2 INSUFFLATION;  Surgeon: Juan Carlos Mccann MD;  [facility]     ENHANCE LASER REFRACTIVE EXISTING PT OUTSIDE PARAMETERS Right 3/13/2017    Procedure: ENHANCE LASER REFRACTIVE EXISTING PT OUTSIDE PARAMETERS;  Surgeon: Bong Guerrero MD;  Location: [facility]     ESOPHAGOSCOPY, GASTROSCOPY, DUODENOSCOPY (EGD), COMBINED N/A 7/10/2019    Procedure: Esophagogastroduodenoscopy, With Biopsy;  Surgeon: Juan Carlos Mccann MD;  Location: [facility]     HERNIA REPAIR       PHACOEMULSIFICATION CLEAR CORNEA WITH TORIC INTRAOCULAR LENS IMPLANT Right 4/27/2015    Procedure: PHACOEMULSIFICATION CLEAR CORNEA WITH TORIC INTRAOCULAR LENS IMPLANT;  Surgeon: Bong Guerrero MD;  Location: [facility]     PHACOEMULSIFICATION CLEAR CORNEA WITH TORIC INTRAOCULAR LENS IMPLANT Left 5/11/2015    Procedure: PHACOEMULSIFICATION CLEAR CORNEA WITH TORIC INTRAOCULAR LENS IMPLANT;  Surgeon: Bong Guerrero MD;  Location: [facility]     WAVESCAN SCREENING Right 3/13/2017    Procedure: WAVESCAN SCREENING;  Surgeon: Bong Guerrero MD;  Location: [facility]       Social History:  Patient reports that she has quit smoking. She smoked 0.00 packs per day. She has never used smokeless tobacco. She reports current alcohol use. She reports that she does not use drugs.    Family History:  No family history on file.    Medications:  Current Outpatient Medications   Medication Sig Dispense Refill     amLODIPine (NORVASC) 5 MG tablet Take 1 tablet (5 mg) by mouth daily  (Patient taking differently: Take 5 mg by mouth daily 2.5mg tab daily) 90 tablet 2     atorvastatin (LIPITOR) 10 MG tablet Take 1 tablet (10 mg) by mouth daily 30 tablet 6     BIOTIN PO        Biotin w/ Vitamins C & E (HAIR/SKIN/NAILS PO)        cholestyramine (QUESTRAN) 4 GM/DOSE powder Take 4 g by mouth daily 360 g 3     cod liver oil CAPS capsule        furosemide (LASIX) 20 MG tablet Take 1 tablet (20 mg) by mouth daily 90 tablet 1     furosemide (LASIX) 20 MG tablet Take 20 mg by mouth daily       imiquimod (ALDARA) 5 % external cream Apply topically three times a week 24 packet 0     Loperamide HCl (IMODIUM OR)        losartan (COZAAR) 25 MG tablet Take 1 tablet (25 mg) by mouth daily . DOSE CHANGE. (Patient taking differently: Take 25 mg by mouth daily Taking 1/2 tab) 90 tablet 1     Multiple Vitamins-Minerals (MULTIVITAL PO)        multivitamin  with lutein (OCUVITE WITH LTEIN) CAPS Take 1 capsule by mouth daily       psyllium (METAMUCIL) 28.3 % packet Take 1 packet by mouth daily       vitamin D3 (CHOLECALCIFEROL) 1000 units (25 mcg) tablet Take by mouth daily          Allergies   Allergen Reactions     Ciprofloxacin      Patient states just got sick          Review of Systems:  -As per HPI  -Constitutional: Otherwise feeling well today, in usual state of health.  -HEENT: Patient denies nonhealing oral sores.  -Skin: As above in HPI. No additional skin concerns.    Physical exam:  Vitals: There were no vitals taken for this visit.  GEN: This is a well developed, well-nourished female in no acute distress, in a pleasant mood.    SKIN: Total skin excluding the undergarment areas was performed. The exam included the head/face, neck, both arms, chest, back, abdomen, both legs, digits and/or nails.   -Sanchez skin type: II  -There are fine lines and dyspigmentation on sun exposed areas of the face and chest.  -There are erythematous papules and scattered telangectasias on the bilateral cheeks and nose.    -There is xerosis of the entire body.   -L hand third finger with translucent bluish papule  -No other lesions of concern on areas examined.     Impression/Plan:  1. Rosacea (erythematotelangiectatic)    Amlactin for moisturization    May see cosmetic dermatology    2. Digital mucous cyst    Refer to hand surgeon if would like    3. Solar lentigines (diffuse photodamage)/xerosis, stucco keratoses    Amlactin    May see cosmetic dermatology    4. Androgenetic alopecia    Start minoxidil 5%    CC Stewart Stevens MD  [address] [place] [id number] on close of this encounter.  Follow-up in 3 months, earlier for new or changing lesions.       Dr. Coughlin staffed the patient.    Staff Involved:  Resident(Christopher Fair)/Staff  I, Lelo Coughlin MD, saw this patient with the resident and agree with the resident s findings and plan of care as documented in the resident s note.      Again, thank you for allowing me to participate in the care of your patient.      Sincerely,    Lelo Coughlin MD

## 2019-10-22 NOTE — PATIENT INSTRUCTIONS
Dry skin--recommend using your moisturizer IMMEDIATELY after bathing      Topical Rogaine (Minoxidil) for Pattern Hair Loss      Minoxidil is an FDA approved over the counter topical for the treatment of hair loss and thinning hair in men and women.     Initially a 2% solution was available however this required application twice daily. A 5% solution is also now approved, only requiring application once per day.     Available Products:     Rogaine 5% solution: Packaged for men however can be used by men or women. Use dropper and apply directly to scalp at bedtime. This product can cause an allergy because of presence of propylene glycol. Stop this product if you develop a rash or itching and contact your physician.     Rogaine 5% foam: Packaged for men and women: Apply foam directly to the scalp once daily. This is less greasy compared to the solution. This formula is preferred for those who had a reaction to the solution product. If you develop rash or itching, stop the product and contact your physician.     What if I stop minoxidil topical?     After stopping minoxidil the hair will return to the usual pattern of thinning. Using the product 3-4 times per week is better than not using product at all.       Can I use generic minoxidil?     Yes, look for 5% minoxidil.     What are the side effects?    The most common side effect is rash or itching of the scalp. This can occur if a contact allergy develops with propylene glycol. A small group of patients noticed the appearance of facial hair if the product runs onto the face or with prolonged use.       Last updated: 6/26/2016    Dry Skin    What is dry skin?    Common skin problem    Can be worse during the winter     Affects all ages    Occurs in people with or without other skin problems    What does it look like?    Fine lines in the skin become more visible     Rough feeling skin     Flaky skin    Most common on the arms and legs    Skin can become cracked,  especially on the hands and feet    What are some problems caused by dry skin?     Itching    Rubbing or scratching can cause thickened, rough skin patches    Cracks in skin can be painful    Red, itchy, scaly skin (called eczema) can occur    Yellow crusting or pus could be signs of an infection    What causes dry skin?    A lack of water in the top layer of the skin    Too much soapy water,  hot water, or harsh chemicals    Aging and sun damage    How do I treat dry skin?    Shower or bathe daily for under ten minutes with lukewarm water and mild soap.    Pat yourself dry with a towel gently and leave your skin slightly damp.    Use moisturizing cream or ointment right away.  Avoid lotions.    What kind of mild soap should I be using?    Camay , Dove , Tone , Neutrogena , Purpose , or Oil of Olay     A non-detergent cleanser, like Cetaphil , can be used.    What should I stay away from?    Scented soaps     Bath oils    What moisturizers should I be using?    Cetaphil Cream,CeraVe Cream, Vanicream, Aquaphilic, Eucerin, Aquaphor, or Vaseline     Always apply after showering or bathing.    Reapply throughout the day, if possible.    If dry skin affects your hands, always reapply after handwashing.    What else should I know?    Using a humidifier during winter months may help.    If dry skin gets worse or if eczema develops, a steroid cream may be needed.        Putting on Compression Stockings     Turn the stocking inside-out, then fit it over your toes and heel.         Roll the stocking up your leg.           Once stockings are on, make sure the top of the stocking is about two fingers  width below the crease of the knee (or the groin if you wear thigh-high stockings).         Use equipment, such as a stocking rachid, or wear rubber gloves to make it easier to put on compression stockings.        Elastic compression stockings are prescribed to treat many vein problems. Wearing them may be the most important thing  you do to manage your symptoms. The stockings fit tightly around your ankle, gradually reducing in pressure as they go up your legs. This helps keep blood flowing to your heart. As a result, swelling is reduced. Your healthcare provider will prescribe stockings at a safe pressure for you. He or she will also tell you how often to wear and remove the stockings. Follow these instructions closely. Also, do not buy or wear compression stockings without first seeing your healthcare provider.  Tips for wear and care  To wear stockings safely and to get the most benefit:    Wear the length prescribed by your healthcare provider.    Pull them to the designated height and no farther. Don t let them bunch at the top. This can restrict blood flow and increase swelling.    Wear the stockings for the amount of time your healthcare provider recommends. Replace them when they start to feel loose. This will likely be every 3 to 6 months.    Remove them as your healthcare provider directs. When removed, wash your legs. Then check your legs and feet for sores. Call your healthcare provider if you find a sore. Don t put the stockings back on unless your healthcare provider directs.     Wash the stockings as instructed. They may need to be hand-washed.  Date Last Reviewed: 5/1/2016 2000-2017 The [employer]. [address] [place] [address], [place] [address]. All rights reserved. This information is not intended as a substitute for professional medical care. Always follow your healthcare professional's instructions.         1. Obtain prescription from the prescribing provider   2. Contact medical equipment facility. See listed suggestions below; find a location near you!        >[employer]: [phone]        > [facility] [facility] [employer]: [phone]  3. Verify insurance coverage (Medicare or Private)   4. Arrange stocking fitting/ submit prescription

## 2020-01-14 ENCOUNTER — OFFICE VISIT (OUTPATIENT)
Dept: DERMATOLOGY | Facility: CLINIC | Age: 75
End: 2020-01-14
Payer: COMMERCIAL

## 2020-01-14 DIAGNOSIS — L71.9 ROSACEA: Primary | ICD-10-CM

## 2020-01-14 ASSESSMENT — PAIN SCALES - GENERAL: PAINLEVEL: NO PAIN (0)

## 2020-01-14 NOTE — NURSING NOTE
"Dermatology Rooming Note    Lashell Hogue's goals for this visit include:   Chief Complaint   Patient presents with     Derm Problem     Lashell is here for a follow up on rosacea, hair thinning, and cyst. Lashell states, \"I have concerns about sores on my skin and the medcations that she was prescribed\"     Taylor Anglin, EMT    "

## 2020-01-14 NOTE — PROGRESS NOTES
"[facility] Dermatology Note      Dermatology Problem List:  1. Photodamage, xerosis  - continue Amlactin  2. Digital mucous cyst  3. Hair thinning  - start minoxidil 5%  4. Rosacea  - Metrogel for 12 weeks, then referral to cosmetics for PDT.    Encounter Date: Jan 14, 2020    CC:   Chief Complaint   Patient presents with     Derm Problem     Lashell is here for a follow up on rosacea, hair thinning, and cyst. Lashell states, \"I have concerns about sores on my skin and the medcations that she was prescribed\"         History of Present Illness:  Ms. Lashell Hogue is a 74 year old female last seen 10/22/19 at which time she had a number of concerns (see prior note). Today she thinks that the \"sores\" on her arms and back have decreased. She has been using Cerave for dry skin. Back continues to itch despite regular use of Cerave. Her hair loss is \"not worse\" but is \"not falling out in clumps or anything,\" not been using minodixil regularly, often forgets; overall still happy with hair retention. Losing less than 100 strands a day, counts carefully. Overall \"nothing is worse.\" She has numerous telangiectatic areas on the cheeks and milia, but has not tried metrogel or cream in the past.      Past Medical History:   Patient Active Problem List   Diagnosis     Esophageal reflux     Bilateral sensorineural hearing loss     Chronic diarrhea     Christy's esophagus without dysplasia     Benign essential hypertension     Mixed hyperlipidemia     Pre-diabetes     Genital warts     Past Medical History:   Diagnosis Date     Chronic diarrhea      Hypertension      Past Surgical History:   Procedure Laterality Date     ABDOMEN SURGERY       COLONOSCOPY       COLONOSCOPY WITH CO2 INSUFFLATION N/A 7/10/2019    Procedure: COLONOSCOPY, WITH CO2 INSUFFLATION;  Surgeon: Juan Carlos Mccann MD;  Location: [facility]     COMBINED ESOPHAGOSCOPY, GASTROSCOPY, DUODENOSCOPY (EGD) WITH CO2 INSUFFLATION N/A 7/10/2019 "    Procedure: ESOPHAGOGASTRODUODENOSCOPY, WITH CO2 INSUFFLATION;  Surgeon: Juan Carlos Mccann MD;  Location: [facility]     ENHANCE LASER REFRACTIVE EXISTING PT OUTSIDE PARAMETERS Right 3/13/2017    Procedure: ENHANCE LASER REFRACTIVE EXISTING PT OUTSIDE PARAMETERS;  Surgeon: Bong Guerrero MD;  Location: [facility]     ESOPHAGOSCOPY, GASTROSCOPY, DUODENOSCOPY (EGD), COMBINED N/A 7/10/2019    Procedure: Esophagogastroduodenoscopy, With Biopsy;  Surgeon: Juan Carlos Mccann MD;  Location: [facility]     HERNIA REPAIR       PHACOEMULSIFICATION CLEAR CORNEA WITH TORIC INTRAOCULAR LENS IMPLANT Right 4/27/2015    Procedure: PHACOEMULSIFICATION CLEAR CORNEA WITH TORIC INTRAOCULAR LENS IMPLANT;  Surgeon: Bong Guerrero MD;  Location: [facility]     PHACOEMULSIFICATION CLEAR CORNEA WITH TORIC INTRAOCULAR LENS IMPLANT Left 5/11/2015    Procedure: PHACOEMULSIFICATION CLEAR CORNEA WITH TORIC INTRAOCULAR LENS IMPLANT;  Surgeon: Bong Guerrero MD;  Location: [facility]     WAVESCAN SCREENING Right 3/13/2017    Procedure: WAVESCAN SCREENING;  Surgeon: Bong Guerrero MD;  Location: [facility]       Social History:  Patient reports that she has quit smoking. She smoked 0.00 packs per day. She has never used smokeless tobacco. She reports current alcohol use. She reports that she does not use drugs.    Family History:  No family history on file.    Medications:  Current Outpatient Medications   Medication Sig Dispense Refill     amLODIPine (NORVASC) 5 MG tablet Take 1 tablet (5 mg) by mouth daily (Patient taking differently: Take 5 mg by mouth daily 2.5mg tab daily) 90 tablet 2     atorvastatin (LIPITOR) 10 MG tablet Take 1 tablet (10 mg) by mouth daily 30 tablet 6     BIOTIN PO        Biotin w/ Vitamins C & E (HAIR/SKIN/NAILS PO)        cholestyramine (QUESTRAN) 4 GM/DOSE powder Take 4 g by mouth daily 360 g 3     cod liver oil CAPS capsule        furosemide (LASIX) 20 MG tablet Take 1 tablet (20 mg) by mouth daily 90 tablet 1      furosemide (LASIX) 20 MG tablet Take 20 mg by mouth daily       imiquimod (ALDARA) 5 % external cream Apply topically three times a week 24 packet 0     Loperamide HCl (IMODIUM OR)        losartan (COZAAR) 25 MG tablet Take 1 tablet (25 mg) by mouth daily . DOSE CHANGE. (Patient taking differently: Take 25 mg by mouth daily Taking 1/2 tab) 90 tablet 1     Multiple Vitamins-Minerals (MULTIVITAL PO)        multivitamin  with lutein (OCUVITE WITH LTEIN) CAPS Take 1 capsule by mouth daily       psyllium (METAMUCIL) 28.3 % packet Take 1 packet by mouth daily       vitamin D3 (CHOLECALCIFEROL) 1000 units (25 mcg) tablet Take by mouth daily          Allergies   Allergen Reactions     Ciprofloxacin      Patient states just got sick          Review of Systems:  -As per HPI  -Constitutional: Otherwise feeling well today, in usual state of health.  -HEENT: Patient denies nonhealing oral sores.  -Skin: As above in HPI. No additional skin concerns.    Physical exam:  Vitals: There were no vitals taken for this visit.  GEN: This is a well developed, well-nourished female in no acute distress, in a pleasant mood.    SKIN: Total skin excluding the undergarment areas was performed. The exam included the head/face, neck, both arms, chest, back, abdomen, both legs, digits and/or nails.   -Sanchez skin type: II  -There are fine lines and dyspigmentation on sun exposed areas of the face and chest.  -There are erythematous papules and scattered telangectasias on the bilateral cheeks and nose.   -There is xerosis of the entire body.   -L hand third finger with translucent bluish papule  -No other lesions of concern on areas examined.     Impression/Plan:  1. Rosacea (erythematotelangiectatic)    Metrocream BID for 12 weeks    If no improvement, cosmetics referral for PDT    2. Digital mucous cyst    Refer to hand surgeon if would like    3. Solar lentigines (diffuse photodamage)/xerosis, stucco keratoses    Amlactin    May see  cosmetic dermatology    4. Androgenetic alopecia    Continue minoxidil 5%    CC Stewart Stevens MD  [address] [place] [address] on close of this encounter.  Follow-up in 3 months, earlier for new or changing lesions.       Dr. Coughlin staffed the patient.    Staff Involved:  Resident(Leslee Cross)/Staff    Leslee Knox MD  Medicine/Dermatology PGY-5  p [phone]    I, Lelo Coughlin MD, saw this patient with the resident and agree with the resident s findings and plan of care as documented in the resident s note.

## 2020-01-14 NOTE — PATIENT INSTRUCTIONS
Use metogel twice a day for 12 weeks  Call us if you are not better and we can send you to cosmetic dermatology

## 2020-01-14 NOTE — LETTER
"1/14/2020       RE: Lashell Hogue  [address]  [facility] [place] [address]     Dear Colleague,    Thank you for referring your patient, Lashell Hogue, to the [facility] DERMATOLOGY at [facility]. Please see a copy of my visit note below.    [facility] Dermatology Note      Dermatology Problem List:  1. Photodamage, xerosis  - continue Amlactin  2. Digital mucous cyst  3. Hair thinning  - start minoxidil 5%  4. Rosacea  - Metrogel for 12 weeks, then referral to cosmetics for PDT.    Encounter Date: Jan 14, 2020    CC:   Chief Complaint   Patient presents with     Derm Problem     Lashell is here for a follow up on rosacea, hair thinning, and cyst. Lashell states, \"I have concerns about sores on my skin and the medcations that she was prescribed\"         History of Present Illness:  Ms. Lashell Hogue is a 74 year old female last seen 10/22/19 at which time she had a number of concerns (see prior note). Today she thinks that the \"sores\" on her arms and back have decreased. She has been using Cerave for dry skin. Back continues to itch despite regular use of Cerave. Her hair loss is \"not worse\" but is \"not falling out in clumps or anything,\" not been using minodixil regularly, often forgets; overall still happy with hair retention. Losing less than 100 strands a day, counts carefully. Overall \"nothing is worse.\" She has numerous telangiectatic areas on the cheeks and milia, but has not tried metrogel or cream in the past.      Past Medical History:   Patient Active Problem List   Diagnosis     Esophageal reflux     Bilateral sensorineural hearing loss     Chronic diarrhea     Christy's esophagus without dysplasia     Benign essential hypertension     Mixed hyperlipidemia     Pre-diabetes     Genital warts     Past Medical History:   Diagnosis Date     Chronic diarrhea      Hypertension      Past Surgical History:   Procedure " Laterality Date     ABDOMEN SURGERY       COLONOSCOPY       COLONOSCOPY WITH CO2 INSUFFLATION N/A 7/10/2019    Procedure: COLONOSCOPY, WITH CO2 INSUFFLATION;  Surgeon: Juan Carlos Mccann MD;  [facility]     COMBINED ESOPHAGOSCOPY, GASTROSCOPY, DUODENOSCOPY (EGD) WITH CO2 INSUFFLATION N/A 7/10/2019    Procedure: ESOPHAGOGASTRODUODENOSCOPY, WITH CO2 INSUFFLATION;  Surgeon: Juan Carlos Mccann MD;  [facility]: [facility]     ENHANCE LASER REFRACTIVE EXISTING PT OUTSIDE PARAMETERS Right 3/13/2017    Procedure: ENHANCE LASER REFRACTIVE EXISTING PT OUTSIDE PARAMETERS;  Surgeon: Bong Guerrero MD;  Location: [facility]     ESOPHAGOSCOPY, GASTROSCOPY, DUODENOSCOPY (EGD), COMBINED N/A 7/10/2019    Procedure: Esophagogastroduodenoscopy, With Biopsy;  Surgeon: Juan Carlos Mccann MD;  Location: [facility]     HERNIA REPAIR       PHACOEMULSIFICATION CLEAR CORNEA WITH TORIC INTRAOCULAR LENS IMPLANT Right 4/27/2015    Procedure: PHACOEMULSIFICATION CLEAR CORNEA WITH TORIC INTRAOCULAR LENS IMPLANT;  Surgeon: Bong Guerrero MD;  Location: [facility]     PHACOEMULSIFICATION CLEAR CORNEA WITH TORIC INTRAOCULAR LENS IMPLANT Left 5/11/2015    Procedure: PHACOEMULSIFICATION CLEAR CORNEA WITH TORIC INTRAOCULAR LENS IMPLANT;  Surgeon: Bong Guerrero MD;  Location: [facility]     WAVESCAN SCREENING Right 3/13/2017    Procedure: WAVESCAN SCREENING;  Surgeon: Bong Guerrero MD;  Location: [facility]       Social History:  Patient reports that she has quit smoking. She smoked 0.00 packs per day. She has never used smokeless tobacco. She reports current alcohol use. She reports that she does not use drugs.    Family History:  No family history on file.    Medications:  Current Outpatient Medications   Medication Sig Dispense Refill     amLODIPine (NORVASC) 5 MG tablet Take 1 tablet (5 mg) by mouth daily (Patient taking differently: Take 5 mg by mouth daily 2.5mg tab daily) 90 tablet 2     atorvastatin (LIPITOR) 10 MG tablet Take 1  tablet (10 mg) by mouth daily 30 tablet 6     BIOTIN PO        Biotin w/ Vitamins C & E (HAIR/SKIN/NAILS PO)        cholestyramine (QUESTRAN) 4 GM/DOSE powder Take 4 g by mouth daily 360 g 3     cod liver oil CAPS capsule        furosemide (LASIX) 20 MG tablet Take 1 tablet (20 mg) by mouth daily 90 tablet 1     furosemide (LASIX) 20 MG tablet Take 20 mg by mouth daily       imiquimod (ALDARA) 5 % external cream Apply topically three times a week 24 packet 0     Loperamide HCl (IMODIUM OR)        losartan (COZAAR) 25 MG tablet Take 1 tablet (25 mg) by mouth daily . DOSE CHANGE. (Patient taking differently: Take 25 mg by mouth daily Taking 1/2 tab) 90 tablet 1     Multiple Vitamins-Minerals (MULTIVITAL PO)        multivitamin  with lutein (OCUVITE WITH LTEIN) CAPS Take 1 capsule by mouth daily       psyllium (METAMUCIL) 28.3 % packet Take 1 packet by mouth daily       vitamin D3 (CHOLECALCIFEROL) 1000 units (25 mcg) tablet Take by mouth daily          Allergies   Allergen Reactions     Ciprofloxacin      Patient states just got sick          Review of Systems:  -As per HPI  -Constitutional: Otherwise feeling well today, in usual state of health.  -HEENT: Patient denies nonhealing oral sores.  -Skin: As above in HPI. No additional skin concerns.    Physical exam:  Vitals: There were no vitals taken for this visit.  GEN: This is a well developed, well-nourished female in no acute distress, in a pleasant mood.    SKIN: Total skin excluding the undergarment areas was performed. The exam included the head/face, neck, both arms, chest, back, abdomen, both legs, digits and/or nails.   -Sanchez skin type: II  -There are fine lines and dyspigmentation on sun exposed areas of the face and chest.  -There are erythematous papules and scattered telangectasias on the bilateral cheeks and nose.   -There is xerosis of the entire body.   -L hand third finger with translucent bluish papule  -No other lesions of concern on areas  examined.     Impression/Plan:  1. Rosacea (erythematotelangiectatic)    Metrocream BID for 12 weeks    If no improvement, cosmetics referral for PDT    2. Digital mucous cyst    Refer to hand surgeon if would like    3. Solar lentigines (diffuse photodamage)/xerosis, stucco keratoses    Amlactin    May see cosmetic dermatology    4. Androgenetic alopecia    Continue minoxidil 5%    CC Stewart Stevens MD  [address] [place] [address] on close of this encounter.  Follow-up in 3 months, earlier for new or changing lesions.       Dr. Coughlin staffed the patient.    Staff Involved:  Resident(Leslee Cross)/Staff    Leslee Knox MD  Medicine/Dermatology PGY-5  p [phone]    I, Lelo Coughlin MD, saw this patient with the resident and agree with the resident s findings and plan of care as documented in the resident s note.      Again, thank you for allowing me to participate in the care of your patient.      Sincerely,    Lelo Coughlin MD

## 2020-01-16 ENCOUNTER — TRANSFERRED RECORDS (OUTPATIENT)
Dept: HEALTH INFORMATION MANAGEMENT | Facility: CLINIC | Age: 75
End: 2020-01-16

## 2020-01-22 ENCOUNTER — HOSPITAL ENCOUNTER (OUTPATIENT)
Facility: AMBULATORY SURGERY CENTER | Age: 75
End: 2020-01-22
Attending: INTERNAL MEDICINE
Payer: COMMERCIAL

## 2020-01-29 ENCOUNTER — OFFICE VISIT (OUTPATIENT)
Dept: FAMILY MEDICINE | Facility: CLINIC | Age: 75
End: 2020-01-29
Attending: FAMILY MEDICINE
Payer: COMMERCIAL

## 2020-01-29 VITALS
WEIGHT: 154.8 LBS | HEIGHT: 65 IN | DIASTOLIC BLOOD PRESSURE: 86 MMHG | SYSTOLIC BLOOD PRESSURE: 133 MMHG | BODY MASS INDEX: 25.79 KG/M2 | HEART RATE: 80 BPM

## 2020-01-29 DIAGNOSIS — E78.2 MIXED HYPERLIPIDEMIA: ICD-10-CM

## 2020-01-29 DIAGNOSIS — I25.10 CORONARY ARTERY DISEASE INVOLVING NATIVE CORONARY ARTERY OF NATIVE HEART WITHOUT ANGINA PECTORIS: ICD-10-CM

## 2020-01-29 DIAGNOSIS — D64.9 ANEMIA, UNSPECIFIED TYPE: ICD-10-CM

## 2020-01-29 DIAGNOSIS — R73.03 PREDIABETES: ICD-10-CM

## 2020-01-29 DIAGNOSIS — I10 BENIGN ESSENTIAL HYPERTENSION: ICD-10-CM

## 2020-01-29 LAB
ANION GAP SERPL CALCULATED.3IONS-SCNC: 7 MMOL/L (ref 3–14)
BUN SERPL-MCNC: 11 MG/DL (ref 7–30)
CALCIUM SERPL-MCNC: 8.9 MG/DL (ref 8.5–10.1)
CHLORIDE SERPL-SCNC: 105 MMOL/L (ref 94–109)
CHOLEST SERPL-MCNC: 163 MG/DL
CO2 SERPL-SCNC: 28 MMOL/L (ref 20–32)
CREAT SERPL-MCNC: 0.5 MG/DL (ref 0.52–1.04)
ERYTHROCYTE [DISTWIDTH] IN BLOOD BY AUTOMATED COUNT: 13.2 % (ref 10–15)
GFR SERPL CREATININE-BSD FRML MDRD: >90 ML/MIN/{1.73_M2}
GLUCOSE SERPL-MCNC: 102 MG/DL (ref 70–99)
HBA1C MFR BLD: 5.7 % (ref 0–5.6)
HCT VFR BLD AUTO: 38.8 % (ref 35–47)
HDLC SERPL-MCNC: 84 MG/DL
HGB BLD-MCNC: 12.6 G/DL (ref 11.7–15.7)
LDLC SERPL CALC-MCNC: 47 MG/DL
MCH RBC QN AUTO: 32.3 PG (ref 26.5–33)
MCHC RBC AUTO-ENTMCNC: 32.5 G/DL (ref 31.5–36.5)
MCV RBC AUTO: 100 FL (ref 78–100)
NONHDLC SERPL-MCNC: 79 MG/DL
PLATELET # BLD AUTO: 192 10E9/L (ref 150–450)
POTASSIUM SERPL-SCNC: 3.6 MMOL/L (ref 3.4–5.3)
RBC # BLD AUTO: 3.9 10E12/L (ref 3.8–5.2)
SODIUM SERPL-SCNC: 140 MMOL/L (ref 133–144)
TRIGL SERPL-MCNC: 160 MG/DL
WBC # BLD AUTO: 6.2 10E9/L (ref 4–11)

## 2020-01-29 PROCEDURE — 36415 COLL VENOUS BLD VENIPUNCTURE: CPT | Performed by: FAMILY MEDICINE

## 2020-01-29 PROCEDURE — G0463 HOSPITAL OUTPT CLINIC VISIT: HCPCS | Mod: ZF

## 2020-01-29 PROCEDURE — 85027 COMPLETE CBC AUTOMATED: CPT | Performed by: FAMILY MEDICINE

## 2020-01-29 PROCEDURE — 80048 BASIC METABOLIC PNL TOTAL CA: CPT | Performed by: FAMILY MEDICINE

## 2020-01-29 PROCEDURE — 80061 LIPID PANEL: CPT | Performed by: FAMILY MEDICINE

## 2020-01-29 PROCEDURE — 83036 HEMOGLOBIN GLYCOSYLATED A1C: CPT | Performed by: FAMILY MEDICINE

## 2020-01-29 ASSESSMENT — ENCOUNTER SYMPTOMS
PALPITATIONS: 0
SHORTNESS OF BREATH: 0
DIZZINESS: 0
CONSTITUTIONAL NEGATIVE: 1

## 2020-01-29 ASSESSMENT — MIFFLIN-ST. JEOR: SCORE: 1203.05

## 2020-01-29 NOTE — PROGRESS NOTES
"HPI    1. Pt. Had initially seen a cardiologist 2018 for palpitations, high cholesterol. At that time, started on amlodipine and Lasix. Recently returned to cardiology for follow up visit as had not been back for reassessment in long time. At that time, decided to do Coronary artery CT and calcium score. No current cardiac symptoms      REviewed CT angiogram results with patient  Copied from [facility] Everywhere:  Impression:  1.  Nonobstructive prominent coronary artery atherosclerosis.        A.  Calcium score 418.9 (86th percentile).         B.  No severe lesion in the coronary arteries.    2.  Normal aortic size.  Minor atherosclerotic plaque in the descending   thoracic aorta.      She is now on 81 mg ASA daily and continues on Lipitor 10 mg daily.    2. HTN-- Currently on:   Amlodipine 2.5 mg, losartan 25 mg, furosimide 20 mg  She wonders if she can discontinue the losartan   Taking bp at home--2 different machines:  130's/80's on [employer]  120/80's--on other machine        Review of Systems   Constitutional: Negative.    Respiratory: Negative for shortness of breath.    Cardiovascular: Negative for chest pain and palpitations.   Neurological: Negative for dizziness.         Physical Exam  Constitutional:       Appearance: Normal appearance.   Cardiovascular:      Rate and Rhythm: Normal rate and regular rhythm.      Heart sounds: Normal heart sounds.   Pulmonary:      Effort: Pulmonary effort is normal.      Breath sounds: Normal breath sounds.   Neurological:      Mental Status: She is alert.   Psychiatric:         Mood and Affect: Mood normal.       Blood pressure 133/86, pulse 80, height 1.651 m (5' 5\"), weight 70.2 kg (154 lb 12.8 oz), not currently breastfeeding.      A/P  1. CAD  Discussed meaning of CT angiogram report; reassurance given patient that there is no evidence of current stenosis or obstruction of coronary arteries, but significant overall atherosclerosis.  Reviewed treatment elements: " controlling risk factors, daily ASA    Counseled: Continue ASA   Avoid chronic NSAID's for pain, can use Tyelnol 500 mg tid scheduled  Reviewed lipids, continue current statin  Ok to start cardio exercise routine  To do previously ordered labs: BMP, CBC, HgbA1c, lipids  Reviewed Cardiology note with patient    2. HTN  Continue current medication regimen for now. Given preDM, would like bp 130/80 or less as goal  Recommend bring in both home bp machines to calibrate with on nurse visit  Can discontinue one drug if lightheaded, or bp average under 130/80 or less.     Follow-up 4 months      Total time spent face to face with the patient was 40 minutes. More than 50% of the time spent with the patient involved counseling and/or coordinating care.on the issues documented above.

## 2020-01-29 NOTE — LETTER
1/29/2020       RE: Lashell Hogue  [address]  [place] [facility] [address]     Dear Colleague,    Thank you for referring your patient, Lashell Hogue, to the [facility] at [facility]. Please see a copy of my visit note below.    HPI    1. Pt. Had initially seen a cardiologist 2018 for palpitations, high cholesterol. At that time, started on amlodipine and Lasix. Recently returned to cardiology for follow up visit as had not been back for reassessment in long time. At that time, decided to do Coronary artery CT and calcium score. No current cardiac symptoms      REviewed CT angiogram results with patient  Copied from [facility] Everywhere:  Impression:  1.  Nonobstructive prominent coronary artery atherosclerosis.        A.  Calcium score 418.9 (86th percentile).         B.  No severe lesion in the coronary arteries.    2.  Normal aortic size.  Minor atherosclerotic plaque in the descending   thoracic aorta.      She is now on 81 mg ASA daily and continues on Lipitor 10 mg daily.    2. HTN-- Currently on:   Amlodipine 2.5 mg, losartan 25 mg, furosimide 20 mg  She wonders if she can discontinue the losartan   Taking bp at home--2 different machines:  130's/80's on [employer]  120/80's--on other machine        Review of Systems   Constitutional: Negative.    Respiratory: Negative for shortness of breath.    Cardiovascular: Negative for chest pain and palpitations.   Neurological: Negative for dizziness.         Physical Exam  Constitutional:       Appearance: Normal appearance.   Cardiovascular:      Rate and Rhythm: Normal rate and regular rhythm.      Heart sounds: Normal heart sounds.   Pulmonary:      Effort: Pulmonary effort is normal.      Breath sounds: Normal breath sounds.   Neurological:      Mental Status: She is alert.   Psychiatric:         Mood and Affect: Mood normal.       Blood pressure 133/86, pulse 80, height 1.651  "m (5' 5\"), weight 70.2 kg (154 lb 12.8 oz), not currently breastfeeding.      A/P  1. CAD  Discussed meaning of CT angiogram report; reassurance given patient that there is no evidence of current stenosis or obstruction of coronary arteries, but significant overall atherosclerosis.  Reviewed treatment elements: controlling risk factors, daily ASA    Counseled: Continue ASA   Avoid chronic NSAID's for pain, can use Tyelnol 500 mg tid scheduled  Reviewed lipids, continue current statin  Ok to start cardio exercise routine  To do previously ordered labs: BMP, CBC, HgbA1c, lipids  Reviewed Cardiology note with patient    2. HTN  Continue current medication regimen for now. Given preDM, would like bp 130/80 or less as goal  Recommend bring in both home bp machines to calibrate with on nurse visit  Can discontinue one drug if lightheaded, or bp average under 130/80 or less.     Follow-up 4 months      Total time spent face to face with the patient was 40 minutes. More than 50% of the time spent with the patient involved counseling and/or coordinating care.on the issues documented above.       Again, thank you for allowing me to participate in the care of your patient.      Sincerely,    Stewart Stevens MD      "

## 2020-02-06 NOTE — RESULT ENCOUNTER NOTE
Dear Mariia Hogue,   Here are your recent results which are within the expected range; hgbA1c improved to 5.7!  Please continue with your current plan of care.  Lipids are well controlled on current dose of medication.    Stewart Stevens MD

## 2020-02-12 PROBLEM — I25.10 CORONARY ARTERY DISEASE INVOLVING NATIVE CORONARY ARTERY OF NATIVE HEART WITHOUT ANGINA PECTORIS: Status: ACTIVE | Noted: 2020-02-12

## 2020-02-12 RX ORDER — AMLODIPINE BESYLATE 5 MG/1
5 TABLET ORAL DAILY
Start: 2020-02-12 | End: 2020-05-15

## 2020-03-15 ENCOUNTER — HEALTH MAINTENANCE LETTER (OUTPATIENT)
Age: 75
End: 2020-03-15

## 2020-04-15 DIAGNOSIS — I10 BENIGN ESSENTIAL HYPERTENSION: ICD-10-CM

## 2020-04-15 RX ORDER — LOSARTAN POTASSIUM 25 MG/1
25 TABLET ORAL DAILY
Qty: 90 TABLET | Refills: 0 | Status: SHIPPED | OUTPATIENT
Start: 2020-04-15 | End: 2020-08-13

## 2020-04-15 NOTE — TELEPHONE ENCOUNTER
Received refill request for losartan.  Last in clinic 01/2020.  Due for follow up in June 2020.  Pt called and reminded, and if interested could consider virtual visits if covid restrictions still in place.

## 2020-05-15 DIAGNOSIS — I10 BENIGN ESSENTIAL HYPERTENSION: Primary | ICD-10-CM

## 2020-05-15 DIAGNOSIS — E78.5 HYPERLIPIDEMIA LDL GOAL <100: ICD-10-CM

## 2020-05-15 RX ORDER — ATORVASTATIN CALCIUM 10 MG/1
10 TABLET, FILM COATED ORAL DAILY
Qty: 90 TABLET | Refills: 0 | Status: SHIPPED | OUTPATIENT
Start: 2020-05-15 | End: 2020-08-13

## 2020-05-15 RX ORDER — FUROSEMIDE 20 MG
20 TABLET ORAL DAILY
Qty: 90 TABLET | Refills: 0 | Status: SHIPPED | OUTPATIENT
Start: 2020-05-15 | End: 2020-08-13

## 2020-05-15 RX ORDER — AMLODIPINE BESYLATE 5 MG/1
5 TABLET ORAL DAILY
Qty: 90 TABLET | Refills: 0 | Status: SHIPPED | OUTPATIENT
Start: 2020-05-15 | End: 2020-08-13

## 2020-05-15 NOTE — TELEPHONE ENCOUNTER
Received refill request for furosemide. Patient has already been contacted about scheduling a follow up visit. Rx sent.

## 2020-08-13 DIAGNOSIS — I10 BENIGN ESSENTIAL HYPERTENSION: ICD-10-CM

## 2020-08-13 DIAGNOSIS — E78.5 HYPERLIPIDEMIA LDL GOAL <100: ICD-10-CM

## 2020-08-13 DIAGNOSIS — K52.9 CHRONIC DIARRHEA: ICD-10-CM

## 2020-08-14 RX ORDER — ATORVASTATIN CALCIUM 10 MG/1
10 TABLET, FILM COATED ORAL DAILY
Qty: 30 TABLET | Refills: 0 | Status: SHIPPED | OUTPATIENT
Start: 2020-08-14 | End: 2020-10-01

## 2020-08-14 RX ORDER — FUROSEMIDE 20 MG
20 TABLET ORAL DAILY
Qty: 30 TABLET | Refills: 0 | Status: SHIPPED | OUTPATIENT
Start: 2020-08-14 | End: 2020-11-02

## 2020-08-14 RX ORDER — AMLODIPINE BESYLATE 5 MG/1
5 TABLET ORAL DAILY
Qty: 30 TABLET | Refills: 0 | Status: SHIPPED | OUTPATIENT
Start: 2020-08-14 | End: 2020-10-01

## 2020-08-14 RX ORDER — LOSARTAN POTASSIUM 25 MG/1
25 TABLET ORAL DAILY
Qty: 30 TABLET | Refills: 0 | Status: SHIPPED | OUTPATIENT
Start: 2020-08-14 | End: 2020-10-01

## 2020-08-14 RX ORDER — CHOLESTYRAMINE 4 G/9G
4 POWDER, FOR SUSPENSION ORAL DAILY
Qty: 360 G | Refills: 0 | Status: SHIPPED | OUTPATIENT
Start: 2020-08-14 | End: 2020-12-11

## 2020-08-14 NOTE — TELEPHONE ENCOUNTER
cholestyramine (QUESTRAN) 4 GM/DOSE powder       Last Written Prescription Date:  7-10-19  Last Fill Quantity: 360 g,   # refills: 3  Last Office Visit : 8-20-19  Future Office visit:  none    Routing refill request to provider for review/approval because:  Med not on protocol

## 2020-10-01 DIAGNOSIS — E78.5 HYPERLIPIDEMIA LDL GOAL <100: ICD-10-CM

## 2020-10-01 DIAGNOSIS — M94.9 DISORDER OF BONE AND CARTILAGE: ICD-10-CM

## 2020-10-01 DIAGNOSIS — D64.9 ANEMIA, UNSPECIFIED TYPE: ICD-10-CM

## 2020-10-01 DIAGNOSIS — I10 ESSENTIAL HYPERTENSION: ICD-10-CM

## 2020-10-01 DIAGNOSIS — R73.03 PREDIABETES: Primary | ICD-10-CM

## 2020-10-01 DIAGNOSIS — M89.9 DISORDER OF BONE AND CARTILAGE: ICD-10-CM

## 2020-10-01 DIAGNOSIS — I10 BENIGN ESSENTIAL HYPERTENSION: ICD-10-CM

## 2020-10-01 RX ORDER — FUROSEMIDE 20 MG
20 TABLET ORAL DAILY
Qty: 30 TABLET | Refills: 0 | Status: SHIPPED | OUTPATIENT
Start: 2020-10-01 | End: 2020-12-08

## 2020-10-01 RX ORDER — ATORVASTATIN CALCIUM 10 MG/1
10 TABLET, FILM COATED ORAL DAILY
Qty: 30 TABLET | Refills: 0 | OUTPATIENT
Start: 2020-10-01

## 2020-10-01 RX ORDER — AMLODIPINE BESYLATE 5 MG/1
5 TABLET ORAL DAILY
Qty: 30 TABLET | Refills: 0 | Status: SHIPPED | OUTPATIENT
Start: 2020-10-01 | End: 2020-11-02

## 2020-10-01 RX ORDER — LOSARTAN POTASSIUM 25 MG/1
25 TABLET ORAL DAILY
Qty: 30 TABLET | Refills: 0 | Status: SHIPPED | OUTPATIENT
Start: 2020-10-01 | End: 2020-11-02

## 2020-10-01 RX ORDER — FUROSEMIDE 20 MG
20 TABLET ORAL DAILY
Qty: 90 TABLET | Refills: 1 | OUTPATIENT
Start: 2020-10-01

## 2020-10-01 RX ORDER — ATORVASTATIN CALCIUM 10 MG/1
10 TABLET, FILM COATED ORAL DAILY
Qty: 30 TABLET | Refills: 0 | Status: SHIPPED | OUTPATIENT
Start: 2020-10-01 | End: 2020-11-02

## 2020-10-01 RX ORDER — AMLODIPINE BESYLATE 5 MG/1
5 TABLET ORAL DAILY
Qty: 30 TABLET | Refills: 0 | OUTPATIENT
Start: 2020-10-01

## 2020-10-01 RX ORDER — LOSARTAN POTASSIUM 25 MG/1
25 TABLET ORAL DAILY
Qty: 30 TABLET | Refills: 0 | OUTPATIENT
Start: 2020-10-01

## 2020-10-01 NOTE — TELEPHONE ENCOUNTER
Received refill request for amlodipine, losartan, furosemid, and atorvastatin.  Last in clinic 1/2020, and was asked to follow up in 4 months.  She has not schedule a follow up visit yet.    She was given a 30 day refill 8/13/20 and sent a my chart message reminding her of the need for a follow up appointment.    I called and left another message reminding Mariia of the need for follow up, and advising her to call [phone] to schedule.    Routed to Dr Stevens for review.

## 2020-10-01 NOTE — TELEPHONE ENCOUNTER
Pt informed meds refilled.  Changed her appt to 10.21.20 to accommodate an early lab draw prior to appt and avoid any extra trips away from home and exposure to COVID.  Requesting lab orders and will complete prior to appt.  That am.  Will come for labs fasting.    Please route back to nursing after reviewing and labs placed so nurses may contact pt. Thank you

## 2020-10-01 NOTE — TELEPHONE ENCOUNTER
Pt has an appt scheduled for 10/15/2020 with Dr. Stevens.  A short term supply was given in August 2020- requires MD approval.  Routing to Dr. Stevens

## 2020-10-01 NOTE — TELEPHONE ENCOUNTER
----- Message from Danny Palacio sent at 10/1/2020 12:25 PM CDT -----  Regarding: Refill Request, 4 Meds  [facility] Call Center    Phone Message    May a detailed message be left on voicemail: no     Reason for Call: Medication Refill Request    Has the patient contacted the pharmacy for the refill? Yes   Name of medication being requested:   atorvastatin (LIPITOR) 10 MG tablet  furosemide (LASIX) 20 MG tablet  amLODIPine (NORVASC) 5 MG tablet  losartan (COZAAR) 25 MG tablet    Provider who prescribed the medication: Dr. Stevens  Pharmacy: [facility]  Date medication is needed: ASAP, Pt is OUT of her atorvastatin, but is comfortable on the others. She needs new scripts anyway. Pt is specifically requesting a call back to discuss changing her upcoming appt she scheduled with me on 10/14/20 with Dr. Stevens. Pt is asking if she can a virtual/phone appt due to her concerns regarding COVID19. She is extremely reluctant to have an in-office visit. Please call Pt back.      Action Taken: Message routed to:  Other: ZEINAB RN [facility]    Travel Screening: Not Applicable

## 2020-11-02 ENCOUNTER — TELEPHONE (OUTPATIENT)
Dept: OBGYN | Facility: CLINIC | Age: 75
End: 2020-11-02

## 2020-11-02 DIAGNOSIS — I10 BENIGN ESSENTIAL HYPERTENSION: ICD-10-CM

## 2020-11-02 DIAGNOSIS — E78.5 HYPERLIPIDEMIA LDL GOAL <100: ICD-10-CM

## 2020-11-02 RX ORDER — LOSARTAN POTASSIUM 25 MG/1
25 TABLET ORAL DAILY
Qty: 30 TABLET | Refills: 0 | Status: SHIPPED | OUTPATIENT
Start: 2020-11-02 | End: 2020-12-09

## 2020-11-02 RX ORDER — FUROSEMIDE 20 MG
20 TABLET ORAL DAILY
Qty: 30 TABLET | Refills: 0 | Status: SHIPPED | OUTPATIENT
Start: 2020-11-02 | End: 2020-12-09

## 2020-11-02 RX ORDER — AMLODIPINE BESYLATE 5 MG/1
5 TABLET ORAL DAILY
Qty: 30 TABLET | Refills: 0 | Status: SHIPPED | OUTPATIENT
Start: 2020-11-02 | End: 2020-12-09

## 2020-11-02 RX ORDER — ATORVASTATIN CALCIUM 10 MG/1
10 TABLET, FILM COATED ORAL DAILY
Qty: 30 TABLET | Refills: 0 | Status: SHIPPED | OUTPATIENT
Start: 2020-11-02 | End: 2020-12-09

## 2020-11-02 NOTE — TELEPHONE ENCOUNTER
Pt called requesting refills as her previous appointment with Dr. Stevens was rescheduled and she is now out of medication. Reordered 30 day supply

## 2020-11-03 ENCOUNTER — HOSPITAL ENCOUNTER (OUTPATIENT)
Dept: LAB | Facility: CLINIC | Age: 75
Discharge: HOME | End: 2020-11-03
Attending: FAMILY MEDICINE | Admitting: FAMILY MEDICINE
Payer: COMMERCIAL

## 2020-11-03 DIAGNOSIS — R73.03 PREDIABETES: ICD-10-CM

## 2020-11-03 DIAGNOSIS — M89.9 DISORDER OF BONE AND CARTILAGE: ICD-10-CM

## 2020-11-03 DIAGNOSIS — E78.5 HYPERLIPIDEMIA LDL GOAL <100: ICD-10-CM

## 2020-11-03 DIAGNOSIS — I10 BENIGN ESSENTIAL HYPERTENSION: ICD-10-CM

## 2020-11-03 DIAGNOSIS — M94.9 DISORDER OF BONE AND CARTILAGE: ICD-10-CM

## 2020-11-03 DIAGNOSIS — D64.9 ANEMIA, UNSPECIFIED TYPE: ICD-10-CM

## 2020-11-03 LAB
ALBUMIN SERPL-MCNC: 3.4 G/DL (ref 3.4–5)
ALP SERPL-CCNC: 72 U/L (ref 40–150)
ALT SERPL W P-5'-P-CCNC: 48 U/L (ref 0–50)
ANION GAP SERPL CALCULATED.3IONS-SCNC: 6 MMOL/L (ref 3–14)
AST SERPL W P-5'-P-CCNC: 44 U/L (ref 0–45)
BILIRUB SERPL-MCNC: 0.7 MG/DL (ref 0.2–1.3)
BUN SERPL-MCNC: 11 MG/DL (ref 7–30)
CALCIUM SERPL-MCNC: 9.1 MG/DL (ref 8.5–10.1)
CHLORIDE SERPL-SCNC: 106 MMOL/L (ref 94–109)
CHOLEST SERPL-MCNC: 140 MG/DL
CO2 SERPL-SCNC: 28 MMOL/L (ref 20–32)
CREAT SERPL-MCNC: 0.57 MG/DL (ref 0.52–1.04)
ERYTHROCYTE [DISTWIDTH] IN BLOOD BY AUTOMATED COUNT: 13.2 % (ref 10–15)
GFR SERPL CREATININE-BSD FRML MDRD: >90 ML/MIN/{1.73_M2}
GLUCOSE SERPL-MCNC: 88 MG/DL (ref 70–99)
HBA1C MFR BLD: 5.9 % (ref 0–5.6)
HCT VFR BLD AUTO: 37.9 % (ref 35–47)
HDLC SERPL-MCNC: 89 MG/DL
HGB BLD-MCNC: 12.5 G/DL (ref 11.7–15.7)
LDLC SERPL CALC-MCNC: 28 MG/DL
MCH RBC QN AUTO: 32.9 PG (ref 26.5–33)
MCHC RBC AUTO-ENTMCNC: 33 G/DL (ref 31.5–36.5)
MCV RBC AUTO: 100 FL (ref 78–100)
NONHDLC SERPL-MCNC: 51 MG/DL
PLATELET # BLD AUTO: 189 10E9/L (ref 150–450)
POTASSIUM SERPL-SCNC: 4 MMOL/L (ref 3.4–5.3)
PROT SERPL-MCNC: 7 G/DL (ref 6.8–8.8)
RBC # BLD AUTO: 3.8 10E12/L (ref 3.8–5.2)
SODIUM SERPL-SCNC: 140 MMOL/L (ref 133–144)
TRIGL SERPL-MCNC: 114 MG/DL
WBC # BLD AUTO: 5.3 10E9/L (ref 4–11)

## 2020-11-03 PROCEDURE — 85027 COMPLETE CBC AUTOMATED: CPT | Performed by: FAMILY MEDICINE

## 2020-11-03 PROCEDURE — 83036 HEMOGLOBIN GLYCOSYLATED A1C: CPT | Performed by: FAMILY MEDICINE

## 2020-11-03 PROCEDURE — 80053 COMPREHEN METABOLIC PANEL: CPT | Performed by: FAMILY MEDICINE

## 2020-11-03 PROCEDURE — 80061 LIPID PANEL: CPT | Performed by: FAMILY MEDICINE

## 2020-11-03 PROCEDURE — 82306 VITAMIN D 25 HYDROXY: CPT | Performed by: FAMILY MEDICINE

## 2020-11-03 PROCEDURE — 36415 COLL VENOUS BLD VENIPUNCTURE: CPT | Performed by: FAMILY MEDICINE

## 2020-11-05 ENCOUNTER — TELEPHONE (OUTPATIENT)
Dept: OBGYN | Facility: CLINIC | Age: 75
End: 2020-11-05

## 2020-11-05 NOTE — TELEPHONE ENCOUNTER
----- Message from Natividad Mondragon sent at 11/5/2020 12:41 PM CST -----  Regarding: Medication Refill  Hi,    Patient is calling stating she needs a refill atorvastatin (LIPITOR) 10 MG tablet and she has that filled at [facility] and would like if at all possible if she can pick it up today because how packed it is on the weekend. She does have a telephone visit scheduled with Dr. Stevens tomorrow. Any question please call her at [phone] home or  cell.    Thanks,    Natividad

## 2020-11-05 NOTE — TELEPHONE ENCOUNTER
Script was sent on 11/02/2020 by Dr. Stevens.  Spoke with Lashell and she said [facility] did not receive    Spoke with [facility] , they do have the rx.  It is just that Mariia has two profiles.      Called Mariia and informed they do have rx and she has two profiles at [facility] and that is where the confusion came to be in regards to her rx.

## 2020-11-06 ENCOUNTER — VIRTUAL VISIT (OUTPATIENT)
Dept: FAMILY MEDICINE | Facility: CLINIC | Age: 75
End: 2020-11-06
Attending: FAMILY MEDICINE
Payer: COMMERCIAL

## 2020-11-06 DIAGNOSIS — I10 BENIGN ESSENTIAL HYPERTENSION: Primary | ICD-10-CM

## 2020-11-06 LAB
DEPRECATED CALCIDIOL+CALCIFEROL SERPL-MC: <44 UG/L (ref 20–75)
VITAMIN D2 SERPL-MCNC: <5 UG/L
VITAMIN D3 SERPL-MCNC: 39 UG/L

## 2020-11-06 PROCEDURE — 99214 OFFICE O/P EST MOD 30 MIN: CPT | Mod: 95 | Performed by: FAMILY MEDICINE

## 2020-11-06 NOTE — PROGRESS NOTES
"Lashell Hogue is a 75 year old female who is being evaluated via a billable telephone visit.      The patient has been notified of following:     \"This telephone visit will be conducted via a call between you and your physician/provider. We have found that certain health care needs can be provided without the need for a physical exam.  This service lets us provide the care you need with a short phone conversation.  If a prescription is necessary we can send it directly to your pharmacy.  If lab work is needed we can place an order for that and you can then stop by our lab to have the test done at a later time.    Telephone visits are billed at different rates depending on your insurance coverage. During this emergency period, for some insurers they may be billed the same as an in-person visit.  Please reach out to your insurance provider with any questions.    If during the course of the call the physician/provider feels a telephone visit is not appropriate, you will not be charged for this service.\"    Patient has given verbal consent for Telephone visit?  Yes    What phone number would you like to be contacted at? Preferred    How would you like to obtain your AVS? Attilahart    Subjective     Lashell Hogue is a 75 year old female who presents via phone visit today for the following health issues:    HPI     1. HTN--continues on  Amlodipine 2.5 mg, losartan 25 mg, furosimide 20 mg:  Home  BP  105-125/60's per patient  No lightheadedness,  Reviewed recent labs: BMP and and lipids were normal  2. PreDM  Reviewed recent HgbA1c with patient -5.9%, up from last lab  She has been craving bread, starches, pasta  Has started doing weight watchers  3. Chronic back pain--takes Aleve as needed for back pain, 2x/wk at most. Would like to know if ok to use with blood pressure medications. Using topical lidocaine as well      3. Concern about ongoing lesions on body that are itchy, scratch and bleed, has seen " dermtology  Reviewed dermatology note 10/22/2020 with patient: exam and symptoms consistent  with xwerosis and sebarrheic keratoses    Review of Systems   CONSTITUTIONAL: NEGATIVE for fever, chills, change in weight  RESP: NEGATIVE for significant cough or SOB  CV: NEGATIVE for chest pain, palpitations or peripheral edema   Early waking with sleep,  Energy ok     Objective      No vitals were obtained today due to virtual visit.    healthy, alert and no distress  PSYCH: Alert and oriented times 3; coherent speech, normal   rate and volume, able to articulate logical thoughts, able   to abstract reason, no tangential thoughts, no hallucinations   or delusions  Her affect is normal  RESP: No cough, no audible wheezing, able to talk in full sentences  Remainder of exam unable to be completed due to telephone visits        Assessment/Plan:  1. HTN  Reviewed guidelines for BP goals for patients her age; can lower her medications, but will wait to calibrate her home monitor  with office machine at in person visit    2. Pre DM  Counseled reduce simple starches in diet, continue Weight watchers  3. Back pain--ok to use intermittent OTC naproxen, to avoid chronic, daily use as will increase blood pressure, stress on kidneys  4. Xerosis and Seb. Keratoses  Counseled patient regarding these diagnoses and treatment options  To avoid scratching and use Amlactine frequently  Follow-up with derm in 3 months, especially if do above and still having bleeding issues    Follow-up for in person CPE in 4months, if comfortable    Phone call duration:  30 min minutes

## 2020-11-06 NOTE — LETTER
"11/6/2020       RE: Lashell Hogue  [address]  [place] [address]     Dear Colleague,    Thank you for referring your patient, Lashell Hogue, to the [facility] [place] at [facility]. Please see a copy of my visit note below.    Lashell Hogue is a 75 year old female who is being evaluated via a billable telephone visit.      The patient has been notified of following:     \"This telephone visit will be conducted via a call between you and your physician/provider. We have found that certain health care needs can be provided without the need for a physical exam.  This service lets us provide the care you need with a short phone conversation.  If a prescription is necessary we can send it directly to your pharmacy.  If lab work is needed we can place an order for that and you can then stop by our lab to have the test done at a later time.    Telephone visits are billed at different rates depending on your insurance coverage. During this emergency period, for some insurers they may be billed the same as an in-person visit.  Please reach out to your insurance provider with any questions.    If during the course of the call the physician/provider feels a telephone visit is not appropriate, you will not be charged for this service.\"    Patient has given verbal consent for Telephone visit?  Yes    What phone number would you like to be contacted at? Preferred    How would you like to obtain your AVS? MyChart    Subjective     Lashell Hogue is a 75 year old female who presents via phone visit today for the following health issues:    HPI     1. HTN--continues on  Amlodipine 2.5 mg, losartan 25 mg, furosimide 20 mg:  Home  BP  105-125/60's per patient  No lightheadedness,  Reviewed recent labs: BMP and and lipids were normal  2. PreDM  Reviewed recent HgbA1c with patient -5.9%, up from last lab  She has been " craving bread, starches, pasta  Has started doing weight watchers  3. Chronic back pain--takes Aleve as needed for back pain, 2x/wk at most. Would like to know if ok to use with blood pressure medications. Using topical lidocaine as well      3. Concern about ongoing lesions on body that are itchy, scratch and bleed, has seen dermtology  Reviewed dermatology note 10/22/2020 with patient: exam and symptoms consistent  with xwerosis and sebarrheic keratoses    Review of Systems   CONSTITUTIONAL: NEGATIVE for fever, chills, change in weight  RESP: NEGATIVE for significant cough or SOB  CV: NEGATIVE for chest pain, palpitations or peripheral edema   Early waking with sleep,  Energy ok     Objective      No vitals were obtained today due to virtual visit.    healthy, alert and no distress  PSYCH: Alert and oriented times 3; coherent speech, normal   rate and volume, able to articulate logical thoughts, able   to abstract reason, no tangential thoughts, no hallucinations   or delusions  Her affect is normal  RESP: No cough, no audible wheezing, able to talk in full sentences  Remainder of exam unable to be completed due to telephone visits        Assessment/Plan:  1. HTN  Reviewed guidelines for BP goals for patients her age; can lower her medications, but will wait to calibrate her home monitor  with office machine at in person visit    2. Pre DM  Counseled reduce simple starches in diet, continue Weight watchers  3. Back pain--ok to use intermittent OTC naproxen, to avoid chronic, daily use as will increase blood pressure, stress on kidneys  4. Xerosis and Seb. Keratoses  Counseled patient regarding these diagnoses and treatment options  To avoid scratching and use Amlactine frequently  Follow-up with derm in 3 months, especially if do above and still having bleeding issues    Follow-up for in person CPE in 4months, if comfortable    Phone call duration:  30 min minutes

## 2020-12-08 DIAGNOSIS — I10 BENIGN ESSENTIAL HYPERTENSION: ICD-10-CM

## 2020-12-08 DIAGNOSIS — E78.5 HYPERLIPIDEMIA LDL GOAL <100: ICD-10-CM

## 2020-12-08 RX ORDER — LOSARTAN POTASSIUM 25 MG/1
25 TABLET ORAL DAILY
Qty: 30 TABLET | Refills: 0 | Status: CANCELLED | OUTPATIENT
Start: 2020-12-08

## 2020-12-08 RX ORDER — ATORVASTATIN CALCIUM 10 MG/1
10 TABLET, FILM COATED ORAL DAILY
Qty: 30 TABLET | Refills: 0 | Status: CANCELLED | OUTPATIENT
Start: 2020-12-08

## 2020-12-08 RX ORDER — AMLODIPINE BESYLATE 5 MG/1
5 TABLET ORAL DAILY
Qty: 30 TABLET | Refills: 0 | Status: CANCELLED | OUTPATIENT
Start: 2020-12-08

## 2020-12-08 RX ORDER — FUROSEMIDE 20 MG
20 TABLET ORAL DAILY
Qty: 30 TABLET | Refills: 0 | Status: CANCELLED | OUTPATIENT
Start: 2020-12-08

## 2020-12-08 NOTE — TELEPHONE ENCOUNTER
Norvasc  Dose: 5 mg  Quantity: 30  Refill: 0  _____    Losartan Potassium  Dose: 25 mg  Quantity: 30  Refill: 0  _____    Lasix  Dose: 20 mg  Quantity: 30  Refill: 0    Take 1 tablet daily  _____    Atorvastatin  Dose: 10 mg  Quantity: 30  Refill: 0    Last seen 11/6 - 4 mo follow up      Ester KooCMA

## 2020-12-09 ENCOUNTER — TELEPHONE (OUTPATIENT)
Dept: OBGYN | Facility: CLINIC | Age: 75
End: 2020-12-09

## 2020-12-09 DIAGNOSIS — I10 BENIGN ESSENTIAL HYPERTENSION: ICD-10-CM

## 2020-12-09 DIAGNOSIS — E78.5 HYPERLIPIDEMIA LDL GOAL <100: ICD-10-CM

## 2020-12-09 RX ORDER — FUROSEMIDE 20 MG
20 TABLET ORAL DAILY
Qty: 90 TABLET | Refills: 3 | Status: SHIPPED | OUTPATIENT
Start: 2020-12-09 | End: 2021-01-22

## 2020-12-09 RX ORDER — AMLODIPINE BESYLATE 5 MG/1
5 TABLET ORAL DAILY
Qty: 90 TABLET | Refills: 3 | Status: SHIPPED | OUTPATIENT
Start: 2020-12-09 | End: 2021-02-08

## 2020-12-09 RX ORDER — LOSARTAN POTASSIUM 25 MG/1
25 TABLET ORAL DAILY
Qty: 90 TABLET | Refills: 3 | Status: SHIPPED | OUTPATIENT
Start: 2020-12-09

## 2020-12-09 RX ORDER — ATORVASTATIN CALCIUM 10 MG/1
10 TABLET, FILM COATED ORAL DAILY
Qty: 90 TABLET | Refills: 3 | Status: SHIPPED | OUTPATIENT
Start: 2020-12-09

## 2020-12-09 NOTE — TELEPHONE ENCOUNTER
Received call from patient. Patient very frustrated as she has been waiting for refills of medications. She thought they were refilled at Virtual Visit with Dr. Stevens in November, but this was not done. Patient then followed with Dr. Stevens's MA but these needed to be signed by provider.    Called to Mariia and detailed voicemail left - acknowledging frustration and refills of amlodipine, atorvastatin, furosemide, and losartan sent to pharmacy immediately.

## 2020-12-10 DIAGNOSIS — K52.9 CHRONIC DIARRHEA: ICD-10-CM

## 2020-12-10 NOTE — LETTER
December 29, 2020      Lashell Hogue  [address]  [place] [address]        Dear Lashell Hogue,    In order to ensure that we are providing the best quality care, we would like to remind you that you are overdue appointment with Dr Mccann.     We have been unable to reach you by phone. Please call your clinic or use [facility] to make an appointment with your provider before you run out of medication.  Please let us know if you have any questions and we would be happy to help.     Thank you for trusting us with your care.    Sincerely,     [facility]  [phone]

## 2020-12-11 RX ORDER — CHOLESTYRAMINE 4 G/9G
4 POWDER, FOR SUSPENSION ORAL DAILY
Qty: 360 G | Refills: 3 | Status: SHIPPED | OUTPATIENT
Start: 2020-12-11 | End: 2022-04-27

## 2020-12-11 NOTE — TELEPHONE ENCOUNTER
cholestyramine (QUESTRAN) 4 GM/DOSE powder      Last Written Prescription Date:  8/14/20  Last Fill Quantity: 360,   # refills: 0  Last Office Visit : 8/20/19 recommended 1 year follow up  Future Office visit:  None scheduled    Routing refill request to provider for review/approval because:  Drug not on the [facility], [facility] or [facility] refill protocol

## 2020-12-14 NOTE — TELEPHONE ENCOUNTER
1st attempt. Left message for patient to return call. Patient is over due appointment with Dr Mccann. Number to clinic and Mychart option given, please assist in scheduling once patient returns clinic call.     Call Center OKAY TO SCHEDULE.    Thanks,   Estelita Raygoza  Surgical Specialties Procedure   [facility] [place]  12/14/2020 10:43 AM

## 2020-12-21 NOTE — TELEPHONE ENCOUNTER
2nd attempt to schedule as noted below. Message left for patient to return call and schedule.    Estelita Raygoza  Surgical Specialties Procedure   [employer] [facility] [employer] [place]  12/21/2020 9:19 AM

## 2020-12-29 NOTE — TELEPHONE ENCOUNTER
3rd attempt. Patient has not called to schedule.  Appointment reminder letter sent to patient.      Estelita Raygoza  Surgical Specialties Procedure   [employer] [place]  12/29/2020 7:40 AM

## 2021-01-12 ENCOUNTER — TELEPHONE (OUTPATIENT)
Dept: OBGYN | Facility: CLINIC | Age: 76
End: 2021-01-12

## 2021-01-12 NOTE — TELEPHONE ENCOUNTER
----- Message from Silvana Bradshaw, RN sent at 1/12/2021 12:47 PM CST -----  Regarding: Covid Vaccine  Pt wants to have her place in line to get the vaccine,  [phone]

## 2021-01-12 NOTE — TELEPHONE ENCOUNTER
"Advised that covid vaccines are not currently available in our clinic, and no lists to get on at this time.  Encouraged to monitor [facility] website for additional information about roll out in [place].    She also states that she is having more difficulty with arthritis in her fingers.  She is limited on what she can take for the pain, and is wondering what Dr Stevens thinks about medicinal marijuana as an option    Lastly, she has been having \"tilt\" \"balance\" concerns for about 3 months.  She believes this to be related to her neck position when she is using phone/electronics.  She states that she has not fallen (except for tripping once in her partking lot, but she thinks unrelated and associated with the dark and an obstacle) and doesn't have concerns for falling.  Wonders if this could be med related, and if this can wait until she comes in to see Dr Stevens again if it is not worsening.    Routed to Dr Stevens  "

## 2021-01-14 ENCOUNTER — HEALTH MAINTENANCE LETTER (OUTPATIENT)
Age: 76
End: 2021-01-14

## 2021-01-22 ENCOUNTER — VIRTUAL VISIT (OUTPATIENT)
Dept: FAMILY MEDICINE | Facility: CLINIC | Age: 76
End: 2021-01-22
Attending: FAMILY MEDICINE
Payer: COMMERCIAL

## 2021-01-22 DIAGNOSIS — M79.672 PAIN IN BOTH FEET: ICD-10-CM

## 2021-01-22 DIAGNOSIS — M79.671 PAIN IN BOTH FEET: ICD-10-CM

## 2021-01-22 DIAGNOSIS — R26.89 LOSS OF BALANCE: Primary | ICD-10-CM

## 2021-01-22 DIAGNOSIS — E78.2 MIXED HYPERLIPIDEMIA: ICD-10-CM

## 2021-01-22 DIAGNOSIS — G89.29 CHRONIC BILATERAL LOW BACK PAIN, UNSPECIFIED WHETHER SCIATICA PRESENT: ICD-10-CM

## 2021-01-22 DIAGNOSIS — M54.50 CHRONIC BILATERAL LOW BACK PAIN, UNSPECIFIED WHETHER SCIATICA PRESENT: ICD-10-CM

## 2021-01-22 DIAGNOSIS — I10 ESSENTIAL HYPERTENSION: ICD-10-CM

## 2021-01-22 DIAGNOSIS — L85.3 XEROSIS CUTIS: ICD-10-CM

## 2021-01-22 DIAGNOSIS — M79.10 MYALGIA: ICD-10-CM

## 2021-01-22 PROCEDURE — 99215 OFFICE O/P EST HI 40 MIN: CPT | Mod: 95 | Performed by: FAMILY MEDICINE

## 2021-01-22 PROCEDURE — 99417 PROLNG OP E/M EACH 15 MIN: CPT | Mod: 95 | Performed by: FAMILY MEDICINE

## 2021-01-22 NOTE — LETTER
1/22/2021       RE: Lashell Hogue  [address]  [place] [address]     Dear Colleague,    Thank you for referring your patient, Lashell Hogue, to the [facility] [place] at [facility]. Please see a copy of my visit note below.    The patient has been notified of following:       Patient has given verbal consent for telephone visit? Yes          Time: 820-904         Subjective       Mariia Hogue is a 75 year old individual who presents today for the following   health issues:    1. Balance changes/dizziness  This  started around fall 2020, gradual onset  Occurs only when walking or moving--feels dizzy, senses of being off balance, as if turning head too quickly.  No nausea, no visual changes; no sense of spinning or lightheadedness  Symptoms resolve quickly when stops moving  Denies: dyspnea, chest pain, palpitations, focal numbness/weakness, speech changes  Happens 40-50% of time when walks  No falls, no syncope.  Does not happen when reading, looking up or reaching. lydia. No  Other time than walking or moving.  INitially thought due to HTN medications, stopped all meds for a week with minimal benefit  Saw cardiologist,  who stopped Lasix, no benefit    12/14/2020 Cardiologist note read and reviewed.    2. Skin symptoms  Have gotten worse since last visit 11/6/2020  She is using Amlactin but also started using OTC  Differin cleanser, tried 's skin medication  Having increased dryness, itching and bleeding sores on upper back, neck, chest, arms and legs  Using Peroxide and bacitracin on these.    3. Chronic pain  Denies diffuse constant muscle pain, but had multiple areas of chronic pain, all differ in nature and onset  --Chronic low back pain--known facet arthropathy,degenerative disd disease. She has seen [facility] and [facility] orthopedics in past. Pain has worsened over time but is concerned about going  back for injections until pandemic improves.    --Feet--bottoms sore x 3 months,  Intermittent--only when going to stand up on feet. Feels like walking on arron. Symptoms resolve in 5 minutes, but recur if go to stand after sitting for hour or so.     ---Shoulder area, upper back,  --aching muscle pain with movement x months;  R>L, triggered by reaching, lifting.   No focal weakness  Neck pain  x 2 years, worse since more time on computer with Covid    Treatment tried:  --Salon Pas topical: some relief   2 Aleve helps but can't take more than 2-3 days in row or nausea  Tylenol prn  Less effecttive    Current Outpatient Medications   Medication     amLODIPine (NORVASC) 5 MG tablet     aspirin (ASA) 81 MG tablet     atorvastatin (LIPITOR) 10 MG tablet     BIOTIN PO     Biotin w/ Vitamins C & E (HAIR/SKIN/NAILS PO)     cholestyramine (QUESTRAN) 4 GM/DOSE powder     Loperamide HCl (IMODIUM OR)     losartan (COZAAR) 25 MG tablet     metroNIDAZOLE (METROCREAM) 0.75 % external cream     psyllium (METAMUCIL) 28.3 % packet     vitamin D3 (CHOLECALCIFEROL) 1000 units (25 mcg) tablet     No current facility-administered medications for this visit.      Patient Active Problem List   Diagnosis     Esophageal reflux     Bilateral sensorineural hearing loss     Chronic diarrhea     Christy's esophagus without dysplasia     Benign essential hypertension     Mixed hyperlipidemia     Pre-diabetes     Genital warts     Coronary artery disease involving native coronary artery of native heart without angina pectoris           Objective  Alert, NAD  Speaks clearly and easily in complete sentences        Review of Systems   Bilateral hand pain  Low mood, related to ongoing symptoms  As noted above    Assessment & Plan   Problem List Items Addressed This Visit        Medium    Mixed hyperlipidemia      Other Visit Diagnoses     Loss of balance    -  Primary    Relevant Orders    MRI Brain w & w/o Contrast    MRA Brain ([facility])  wo & w Contrast    US Carotid Bilateral    Xerosis cutis        Pain in both feet        Chronic bilateral low back pain, unspecified whether sciatica present        Myalgia        Essential hypertension             A/P  1. Loss balance/vertigo  Counseled patient regarding possible factors and causes of symptoms, need for exam to assist with diagnosis  Discussed largest concern for cerebrovascular cause (TIA or stroke).   Will get MRI/MRA brain, carotid ultrasound  Can resume 81 mg ASA  BP and lipids controlled  If imaging not helpful, will refer to neurology and consider vestibular rehab.    2. Xerosis cutis and keratosis pilaris  Discussed that winter and use of Differin will make symptoms worse  To stop Differin, continue moisturizers, can use Hydroseal (hydrocolloid) bandaids on larger wounds  To schedule follow up with dermatology    3. Chronic Pain, multiple  Counseled patient that difficult to adequately assess various pain concerns over phone visit; will need in person exam.   Counseled that pain concerns do not appear to linked into one overarching systemic condition by her history  History suggestive of possible foot neuropathy given pre-DM  To schedule in person visit when next available, after MRI  In interim, recommend acetominophen ER 1-2 tabs tid scheduled, can use 1 OTC Aleve sparing prn no more  Than 1x/day.        84 minutes spent on the date of the encounter doing chart review, patient visit and documentation          Stewart Stevens MD  [facility]

## 2021-01-22 NOTE — PROGRESS NOTES
The patient has been notified of following:       Patient has given verbal consent for telephone visit? Yes          Time: 820-904         Subjective       Mariia Hogue is a 75 year old individual who presents today for the following   health issues:    1. Balance changes/dizziness  This  started around fall 2020, gradual onset  Occurs only when walking or moving--feels dizzy, senses of being off balance, as if turning head too quickly.  No nausea, no visual changes; no sense of spinning or lightheadedness  Symptoms resolve quickly when stops moving  Denies: dyspnea, chest pain, palpitations, focal numbness/weakness, speech changes  Happens 40-50% of time when walks  No falls, no syncope.  Does not happen when reading, looking up or reaching. lydia. No  Other time than walking or moving.  INitially thought due to HTN medications, stopped all meds for a week with minimal benefit  Saw cardiologist,  who stopped Lasix, no benefit    12/14/2020 Cardiologist note read and reviewed.    2. Skin symptoms  Have gotten worse since last visit 11/6/2020  She is using Amlactin but also started using OTC  Differin cleanser, tried 's skin medication  Having increased dryness, itching and bleeding sores on upper back, neck, chest, arms and legs  Using Peroxide and bacitracin on these.    3. Chronic pain  Denies diffuse constant muscle pain, but had multiple areas of chronic pain, all differ in nature and onset  --Chronic low back pain--known facet arthropathy,degenerative disd disease. She has seen [facility] and [facility] orthopedics in past. Pain has worsened over time but is concerned about going back for injections until pandemic improves.    --Feet--bottoms sore x 3 months,  Intermittent--only when going to stand up on feet. Feels like walking on arron. Symptoms resolve in 5 minutes, but recur if go to stand after sitting for hour or so.     ---Shoulder area, upper back,  --aching muscle pain with movement x months;   R>L, triggered by reaching, lifting.   No focal weakness  Neck pain  x 2 years, worse since more time on computer with Covid    Treatment tried:  --Salon Pas topical: some relief   2 Aleve helps but can't take more than 2-3 days in row or nausea  Tylenol prn  Less effecttive    Current Outpatient Medications   Medication     amLODIPine (NORVASC) 5 MG tablet     aspirin (ASA) 81 MG tablet     atorvastatin (LIPITOR) 10 MG tablet     BIOTIN PO     Biotin w/ Vitamins C & E (HAIR/SKIN/NAILS PO)     cholestyramine (QUESTRAN) 4 GM/DOSE powder     Loperamide HCl (IMODIUM OR)     losartan (COZAAR) 25 MG tablet     metroNIDAZOLE (METROCREAM) 0.75 % external cream     psyllium (METAMUCIL) 28.3 % packet     vitamin D3 (CHOLECALCIFEROL) 1000 units (25 mcg) tablet     No current facility-administered medications for this visit.      Patient Active Problem List   Diagnosis     Esophageal reflux     Bilateral sensorineural hearing loss     Chronic diarrhea     Christy's esophagus without dysplasia     Benign essential hypertension     Mixed hyperlipidemia     Pre-diabetes     Genital warts     Coronary artery disease involving native coronary artery of native heart without angina pectoris           Objective  Alert, NAD  Speaks clearly and easily in complete sentences        Review of Systems   Bilateral hand pain  Low mood, related to ongoing symptoms  As noted above    Assessment & Plan   Problem List Items Addressed This Visit        Medium    Mixed hyperlipidemia      Other Visit Diagnoses     Loss of balance    -  Primary    Relevant Orders    MRI Brain w & w/o Contrast    MRA Brain ([facility]) wo & w Contrast    US Carotid Bilateral    Xerosis cutis        Pain in both feet        Chronic bilateral low back pain, unspecified whether sciatica present        Myalgia        Essential hypertension             A/P  1. Loss balance/vertigo  Counseled patient regarding possible factors and causes of symptoms, need for exam to  assist with diagnosis  Discussed largest concern for cerebrovascular cause (TIA or stroke).   Will get MRI/MRA brain, carotid ultrasound  Can resume 81 mg ASA  BP and lipids controlled  If imaging not helpful, will refer to neurology and consider vestibular rehab.    2. Xerosis cutis and keratosis pilaris  Discussed that winter and use of Differin will make symptoms worse  To stop Differin, continue moisturizers, can use Hydroseal (hydrocolloid) bandaids on larger wounds  To schedule follow up with dermatology    3. Chronic Pain, multiple  Counseled patient that difficult to adequately assess various pain concerns over phone visit; will need in person exam.   Counseled that pain concerns do not appear to linked into one overarching systemic condition by her history  History suggestive of possible foot neuropathy given pre-DM  To schedule in person visit when next available, after MRI  In interim, recommend acetominophen ER 1-2 tabs tid scheduled, can use 1 OTC Aleve sparing prn no more  Than 1x/day.        84 minutes spent on the date of the encounter doing chart review, patient visit and documentation     {Provider  Link to [facility] Help Grid :958039}     Stewart Stevens MD  [facility]

## 2021-01-29 ENCOUNTER — TELEPHONE (OUTPATIENT)
Dept: OBGYN | Facility: CLINIC | Age: 76
End: 2021-01-29

## 2021-01-29 NOTE — TELEPHONE ENCOUNTER
----- Message from Silvana Bradshaw RN sent at 1/29/2021  1:52 PM CST -----  Regarding: vaccine  Pt said that she went through the form for the vaccine and then it never confirmed with her.  She called the number that it had on the form and they told her to contact the clinic!

## 2021-01-29 NOTE — TELEPHONE ENCOUNTER
Discussed with Mariia that all appointment spots are taken for this week.  She is provided with phone number for central scheduling and encouraged to check back daily.

## 2021-02-02 ENCOUNTER — VIRTUAL VISIT (OUTPATIENT)
Dept: GASTROENTEROLOGY | Facility: CLINIC | Age: 76
End: 2021-02-02
Payer: COMMERCIAL

## 2021-02-02 DIAGNOSIS — K22.70 BARRETT'S ESOPHAGUS WITHOUT DYSPLASIA: Primary | ICD-10-CM

## 2021-02-02 DIAGNOSIS — K52.9 CHRONIC DIARRHEA: ICD-10-CM

## 2021-02-02 DIAGNOSIS — K31.9 SUBEPITHELIAL LESION OF DUODENUM: ICD-10-CM

## 2021-02-02 PROCEDURE — 99213 OFFICE O/P EST LOW 20 MIN: CPT | Mod: 95 | Performed by: INTERNAL MEDICINE

## 2021-02-02 NOTE — PROGRESS NOTES
Mariia Hogue is a 75 year old who is being evaluated via a billable telephone visit.      What phone number would you like to be contacted at?[phone]   How would you [facility] to [facility]  Phone call duration: minutes  Danny Sepulveda CMA

## 2021-02-03 NOTE — PATIENT INSTRUCTIONS
It was nice to talk to you today.  As we discussed, I suggest increasing your soluble fiber with the psyllium to 1 teaspoon twice daily.  You can also increase your cholestyramine to 4 g twice per day.  This can be further increased over time if necessary.  Once you get your Covid vaccination and you are comfortable, we can proceed with the upper endoscopy with endoscopic ultrasound.  As we discussed, possibly discussing with nutrition down the road of the low FODMAP diet could also be useful.  Please let me know if you have any questions or concerns.

## 2021-02-03 NOTE — PROGRESS NOTES
GASTROENTEROLOGY Telephone Follow up visit    CC/REFERRING MD:    Stewart Stevens    HISTORY OF PRESENT ILLNESS:    Lashell Hogue is a 75 year old female who is being evaluated via a billable telephone visit.      We did a follow-up today with Lashell for Christy esophagus and diarrhea.  We previously saw her for these issues in August 2019.  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  She was last evaluated for this in July 2019 and a 3-year follow-up was recommended at that time.  She notes that she has not had any upper GI symptoms of reflux, heartburn, dysphagia etc.  Her main issue currently is that she has ongoing diarrhea.  She reports that she will have a few days where she does not have loose stools or even feels constipated and then she will have a day where she has several loose stools in a day.  This prevents her from doing her usual activities.  She has tried using a teaspoon of fiber as well as cholestyramine once per day.  If she takes both of these in a day then she will feel somewhat constipated.  The GI symptoms prevent her from doing her usual activities.  She has not tried any dietary changes such as a low FODMAP diet.  Weight is stable.    I have reviewed and updated the patient's Past Medical History, Social History, Family History and Medication List.    ALLERGIES  Ciprofloxacin    PERTINENT STUDIES have been reviewed.    ASSESSMENT/PLAN:    Lashell Hogue is a 75 year old female who presents for follow up of Christy esophagus, subepithelial lesion of the duodenum, chronic diarrhea and history of colon polyps.  She does have short segment Christy esophagus from 2019 without dysplasia and 3-year follow-up was recommended.  A subepithelial lesion of the duodenum was also noted during that exam which appeared consistent with a possible lipoma though she has had other upper endoscopies in the past which have not noted any lipoma and it was  fairly prominent.  We previously had discussed doing an EUS to more definitively evaluate this lesion but the patient had decided to hold off due to concerns regarding Covid.  She reports that once she is able to get vaccinated for Covid then she would be willing to get this exam done and I think it is reasonable to wait for that.  We can also reassess the area of Christy esophagus at that point.  Her main concern currently is her ongoing altered bowel habits, mainly with diarrhea that bothers her.  I have suggested that she can increase her soluble fiber to 1 teaspoon twice daily.  I have also suggested that she can increase her cholestyramine to 4 g twice daily.  The cholestyramine can be further increased if necessary.  I also think that meeting with nutrition for low FODMAP diet review may be useful at some point in the future.  She would prefer to do this in person if possible once the current Covid pandemic is under better control.  This seems very reasonable.  We will also keep in mind that she will be due for another colonoscopy surveillance for colorectal polyps in 2022.      Phone call duration: 22 minutes    RTC 3 months    Thank you for this consultation.  It was a pleasure to participate in the care of this patient; please contact us with any further questions.      This note was created with voice recognition software, and while reviewed for accuracy, typos may remain.     Juan Carlos Mccann MD  Division of Gastroenterology, Hepatology and Nutrition  [facility]  [phone]

## 2021-02-05 ENCOUNTER — HOSPITAL ENCOUNTER (OUTPATIENT)
Dept: ULTRASOUND IMAGING | Facility: CLINIC | Age: 76
End: 2021-02-05
Attending: FAMILY MEDICINE
Payer: COMMERCIAL

## 2021-02-05 ENCOUNTER — HOSPITAL ENCOUNTER (OUTPATIENT)
Dept: MRI IMAGING | Facility: CLINIC | Age: 76
End: 2021-02-05
Attending: FAMILY MEDICINE
Payer: COMMERCIAL

## 2021-02-05 DIAGNOSIS — R26.89 LOSS OF BALANCE: ICD-10-CM

## 2021-02-05 LAB
CREAT BLD-MCNC: 0.6 MG/DL (ref 0.52–1.04)
GFR SERPL CREATININE-BSD FRML MDRD: >90 ML/MIN/{1.73_M2}

## 2021-02-05 PROCEDURE — A9585 GADOBUTROL INJECTION: HCPCS | Performed by: FAMILY MEDICINE

## 2021-02-05 PROCEDURE — 70544 MR ANGIOGRAPHY HEAD W/O DYE: CPT

## 2021-02-05 PROCEDURE — 70553 MRI BRAIN STEM W/O & W/DYE: CPT

## 2021-02-05 PROCEDURE — 255N000002 HC RX 255 OP 636: Performed by: FAMILY MEDICINE

## 2021-02-05 PROCEDURE — 82565 ASSAY OF CREATININE: CPT

## 2021-02-05 PROCEDURE — 93880 EXTRACRANIAL BILAT STUDY: CPT

## 2021-02-05 RX ORDER — GADOBUTROL 604.72 MG/ML
7 INJECTION INTRAVENOUS ONCE
Status: COMPLETED | OUTPATIENT
Start: 2021-02-05 | End: 2021-02-05

## 2021-02-05 RX ADMIN — GADOBUTROL 7 ML: 604.72 INJECTION INTRAVENOUS at 10:46

## 2021-02-08 DIAGNOSIS — I10 BENIGN ESSENTIAL HYPERTENSION: ICD-10-CM

## 2021-02-08 RX ORDER — AMLODIPINE BESYLATE 5 MG/1
2.5 TABLET ORAL DAILY
Qty: 90 TABLET | Refills: 1 | Status: SHIPPED | OUTPATIENT
Start: 2021-02-08 | End: 2022-05-20

## 2021-02-10 ENCOUNTER — TELEPHONE (OUTPATIENT)
Dept: FAMILY MEDICINE | Facility: CLINIC | Age: 76
End: 2021-02-10

## 2021-02-10 NOTE — TELEPHONE ENCOUNTER
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    Continue with BP control, patient will come in for inperson visit after COVID vaccine in March.

## 2021-03-14 ENCOUNTER — HEALTH MAINTENANCE LETTER (OUTPATIENT)
Age: 76
End: 2021-03-14

## 2021-04-23 ENCOUNTER — HOSPITAL ENCOUNTER (OUTPATIENT)
Dept: GENERAL RADIOLOGY | Facility: CLINIC | Age: 76
End: 2021-04-23
Attending: FAMILY MEDICINE
Payer: COMMERCIAL

## 2021-04-23 ENCOUNTER — OFFICE VISIT (OUTPATIENT)
Dept: FAMILY MEDICINE | Facility: CLINIC | Age: 76
End: 2021-04-23
Attending: FAMILY MEDICINE
Payer: COMMERCIAL

## 2021-04-23 VITALS
BODY MASS INDEX: 26.63 KG/M2 | DIASTOLIC BLOOD PRESSURE: 82 MMHG | HEART RATE: 80 BPM | SYSTOLIC BLOOD PRESSURE: 133 MMHG | WEIGHT: 160 LBS

## 2021-04-23 DIAGNOSIS — H93.13 TINNITUS, BILATERAL: ICD-10-CM

## 2021-04-23 DIAGNOSIS — R26.89 LOSS OF BALANCE: ICD-10-CM

## 2021-04-23 DIAGNOSIS — H90.3 BILATERAL SENSORINEURAL HEARING LOSS: ICD-10-CM

## 2021-04-23 DIAGNOSIS — M25.549 PAIN IN MULTIPLE FINGER JOINTS: Primary | ICD-10-CM

## 2021-04-23 DIAGNOSIS — Z12.31 ENCOUNTER FOR SCREENING MAMMOGRAM FOR MALIGNANT NEOPLASM OF BREAST: ICD-10-CM

## 2021-04-23 LAB — ERYTHROCYTE [SEDIMENTATION RATE] IN BLOOD BY WESTERGREN METHOD: 9 MM/H (ref 0–30)

## 2021-04-23 PROCEDURE — 36415 COLL VENOUS BLD VENIPUNCTURE: CPT | Performed by: FAMILY MEDICINE

## 2021-04-23 PROCEDURE — 73130 X-RAY EXAM OF HAND: CPT | Mod: 50

## 2021-04-23 PROCEDURE — 73130 X-RAY EXAM OF HAND: CPT | Mod: 26 | Performed by: RADIOLOGY

## 2021-04-23 PROCEDURE — 85652 RBC SED RATE AUTOMATED: CPT | Performed by: FAMILY MEDICINE

## 2021-04-23 PROCEDURE — 86200 CCP ANTIBODY: CPT | Performed by: FAMILY MEDICINE

## 2021-04-23 PROCEDURE — 99215 OFFICE O/P EST HI 40 MIN: CPT | Performed by: FAMILY MEDICINE

## 2021-04-23 PROCEDURE — G0463 HOSPITAL OUTPT CLINIC VISIT: HCPCS

## 2021-04-23 RX ORDER — UBIDECARENONE 100 MG
100 CAPSULE ORAL DAILY
COMMUNITY

## 2021-04-23 ASSESSMENT — ANXIETY QUESTIONNAIRES
5. BEING SO RESTLESS THAT IT IS HARD TO SIT STILL: NOT AT ALL
2. NOT BEING ABLE TO STOP OR CONTROL WORRYING: NEARLY EVERY DAY
1. FEELING NERVOUS, ANXIOUS, OR ON EDGE: MORE THAN HALF THE DAYS
7. FEELING AFRAID AS IF SOMETHING AWFUL MIGHT HAPPEN: MORE THAN HALF THE DAYS
3. WORRYING TOO MUCH ABOUT DIFFERENT THINGS: MORE THAN HALF THE DAYS
GAD7 TOTAL SCORE: 12
6. BECOMING EASILY ANNOYED OR IRRITABLE: MORE THAN HALF THE DAYS
IF YOU CHECKED OFF ANY PROBLEMS ON THIS QUESTIONNAIRE, HOW DIFFICULT HAVE THESE PROBLEMS MADE IT FOR YOU TO DO YOUR WORK, TAKE CARE OF THINGS AT HOME, OR GET ALONG WITH OTHER PEOPLE: VERY DIFFICULT

## 2021-04-23 ASSESSMENT — PATIENT HEALTH QUESTIONNAIRE - PHQ9
SUM OF ALL RESPONSES TO PHQ QUESTIONS 1-9: 20
5. POOR APPETITE OR OVEREATING: SEVERAL DAYS

## 2021-04-23 ASSESSMENT — PAIN SCALES - GENERAL: PAINLEVEL: NO PAIN (0)

## 2021-04-23 NOTE — LETTER
4/23/2021       RE: Lashell Hogue  [address]  [place] [id number]     Dear Colleague,    Thank you for referring your patient, Lashell Hogue, to the [facility]. Please see a copy of my visit note below.    55 minutes spent on the date of the encounter doing chart review, review of test results, interpretation of tests, patient visit and documentation      No follow-ups on file.    Stewart Stevens MD  [facility]    Jose J Hogue is a 75 year old who presents for the following health issues     HPI   1. Balance issues, ongoing  Reviewed MRI results:  MRI showed nonspecific small vessel ischemic changes,  small 3 mm aneursym    Pt. Had similar symptoms in past, 2018,  of walking along and then suddenly feeling off balance, stumbling, without sense of vertigo, then can be without symptoms for hours. Only occurs with movement,, not standing.  Much more severe now than in past, also with tinnitus    Saw audiology and ENT at [facility] in past.   Diagnosis was sensiorneural hearing loss, treated with vestibular PT and improved, but never did hearing aids.    2. Dermatology  Continues to have lesions on neck, is quite anxious about them. Will monitor, continually self treat these areas of skin in order to resolve issue. Will use peroxide among these--in addition to dermatology recommended interventions.     3. Chronic diarrhea s/p colectomy  Continuing to use imodium    4. Swelling, nodules in DIP joints in both hands great toes, limits some mobility. Feels worse since Last Covid vaccine March 3, 2021 Pfizer vaccine, worries about RA.    5. Anxiety and Depression  Has increased depressive symptpoms  No actual intent  to self harm  Not seeing anyone about depression  Has had depressive symptoms in past, not this bad   also  depressed    PHQ 8/21/2019 4/23/2021   PHQ-9 Total Score 11 20   Q9: Thoughts of better off dead/self-harm past 2 weeks Not at all More than half the days     KAILASH-7 SCORE 8/21/2019 4/23/2021   Total Score 11 12       Patient with early waking, sporadic taking 1/2 tab doxylamine OTC  Feels worthless, without purpose  Overeating    Current Outpatient Medications   Medication     amLODIPine (NORVASC) 5 MG tablet     aspirin (ASA) 81 MG tablet     atorvastatin (LIPITOR) 10 MG tablet     cholestyramine (QUESTRAN) 4 GM/DOSE powder     co-enzyme Q-10 100 MG CAPS capsule     Loperamide HCl (IMODIUM OR)     losartan (COZAAR) 25 MG tablet     metroNIDAZOLE (METROCREAM) 0.75 % external cream     psyllium (METAMUCIL) 28.3 % packet     vitamin D3 (CHOLECALCIFEROL) 1000 units (25 mcg) tablet     No current facility-administered medications for this visit.          Review of Systems   As noted above      Objective    /82   Pulse 80   Wt 72.6 kg (160 lb)   Breastfeeding No   BMI 26.63 kg/m    Body mass index is 26.63 kg/m .  Physical Exam   Alert, NAD  Multiple healing superficial skin ulcerations at base of neck, no erythema, scale or pus  Speech RRR, mood is anxious, mild psychomotor agitation, thought process is appropriate,   Affect is normal. No evidence of suicidality.   Hands: Heberden nodes at most DIP joints without effusion, nontender,     A/P  1. Vertigo  Refer to ENT and vestibular PT at [facility]  Will refer to neurology as well given effect on gait, potential for fall    2. Xerosis   Discussed that skin will not heal if continues to overtreat, pick or scratch it  need soap sparingly  only need soap/shower every other day  One skin regimen and stick to it, no scratch, not peroxide, no alcohol swabs, leave skin alone as much as possible    3. OA bilateral hands  Counseled patient on OA compared to RA, will check ESR and CCP  X-ray bilateral hands  Can discuss treatment options next visit    4. KAILASH  5 MDD,  moderate  --discussed potential  Interventions with patient  ---Strongly recommend therapy, and consider medication   ---Daily walk, adequate sleep        Follow-up  1-2 months joint pain, mood

## 2021-04-23 NOTE — PROGRESS NOTES
55 minutes spent on the date of the encounter doing chart review, review of test results, interpretation of tests, patient visit and documentation            No follow-ups on file.    Stewart Stevens MD  [facility]    Jose J Hogue is a 75 year old who presents for the following health issues     HPI   1. Balance issues, ongoing  Reviewed MRI results:  MRI showed nonspecific small vessel ischemic changes,  small 3 mm aneursym    Pt. Had similar symptoms in past, 2018,  of walking along and then suddenly feeling off balance, stumbling, without sense of vertigo, then can be without symptoms for hours. Only occurs with movement,, not standing.  Much more severe now than in past, also with tinnitus    Saw audiology and ENT at [facility] in past.   Diagnosis was sensiorneural hearing loss, treated with vestibular PT and improved, but never did hearing aids.    2. Dermatology  Continues to have lesions on neck, is quite anxious about them. Will monitor, continually self treat these areas of skin in order to resolve issue. Will use peroxide among these--in addition to dermatology recommended interventions.     3. Chronic diarrhea s/p colectomy  Continuing to use imodium    4. Swelling, nodules in DIP joints in both hands great toes, limits some mobility. Feels worse since Last Covid vaccine March 3, 2021 Pfizer vaccine, worries about RA.    5. Anxiety and Depression  Has increased depressive symptpoms  No actual intent  to self harm  Not seeing anyone about depression  Has had depressive symptoms in past, not this bad   also depressed    PHQ 8/21/2019 4/23/2021   PHQ-9 Total Score 11 20   Q9: Thoughts of better off dead/self-harm past 2 weeks Not at all More than half the days     KAILASH-7 SCORE 8/21/2019 4/23/2021   Total Score 11 12       Patient with early waking, sporadic taking 1/2 tab doxylamine OTC  Feels worthless, without purpose  Overeating    Current  Outpatient Medications   Medication     amLODIPine (NORVASC) 5 MG tablet     aspirin (ASA) 81 MG tablet     atorvastatin (LIPITOR) 10 MG tablet     cholestyramine (QUESTRAN) 4 GM/DOSE powder     co-enzyme Q-10 100 MG CAPS capsule     Loperamide HCl (IMODIUM OR)     losartan (COZAAR) 25 MG tablet     metroNIDAZOLE (METROCREAM) 0.75 % external cream     psyllium (METAMUCIL) 28.3 % packet     vitamin D3 (CHOLECALCIFEROL) 1000 units (25 mcg) tablet     No current facility-administered medications for this visit.          Review of Systems   As noted above      Objective    /82   Pulse 80   Wt 72.6 kg (160 lb)   Breastfeeding No   BMI 26.63 kg/m    Body mass index is 26.63 kg/m .  Physical Exam   Alert, NAD  Multiple healing superficial skin ulcerations at base of neck, no erythema, scale or pus  Speech RRR, mood is anxious, mild psychomotor agitation, thought process is appropriate,   Affect is normal. No evidence of suicidality.   Hands: Heberden nodes at most DIP joints without effusion, nontender,     A/P  1. Vertigo  Refer to ENT and vestibular PT at [facility]  Will refer to neurology as well given effect on gait, potential for fall    2. Xerosis   Discussed that skin will not heal if continues to overtreat, pick or scratch it  need soap sparingly  only need soap/shower every other day  One skin regimen and stick to it, no scratch, not peroxide, no alcohol swabs, leave skin alone as much as possible    3. OA bilateral hands  Counseled patient on OA compared to RA, will check ESR and CCP  X-ray bilateral hands  Can discuss treatment options next visit    4. KAILASH  5 MDD, moderate  --discussed potential  Interventions with patient  ---Strongly recommend therapy, and consider medication   ---Daily walk, adequate sleep        Follow-up  1-2 months joint pain, mood

## 2021-04-24 ASSESSMENT — ANXIETY QUESTIONNAIRES: GAD7 TOTAL SCORE: 12

## 2021-04-26 LAB — CCP AB SER IA-ACNC: 1 U/ML

## 2021-04-28 ENCOUNTER — HOSPITAL ENCOUNTER (OUTPATIENT)
Dept: MAMMOGRAPHY | Facility: CLINIC | Age: 76
Discharge: HOME | End: 2021-04-28
Attending: FAMILY MEDICINE | Admitting: FAMILY MEDICINE
Payer: COMMERCIAL

## 2021-04-28 DIAGNOSIS — Z12.31 ENCOUNTER FOR SCREENING MAMMOGRAM FOR MALIGNANT NEOPLASM OF BREAST: ICD-10-CM

## 2021-04-28 PROCEDURE — 77063 BREAST TOMOSYNTHESIS BI: CPT

## 2021-05-06 NOTE — RESULT ENCOUNTER NOTE
Dear Mariia,     Here are your recent results, which show osteoarthritis, including a more severe, erosive version, in both hands. We should meet to discuss treatment options. I can also refer you to rheumatology, if you would prefer.     Please let us know if you have any questions or concerns.    Regards,  Stewart Stevens MD

## 2021-05-06 NOTE — RESULT ENCOUNTER NOTE
Dear Mariia,     Here are your recent results which are within the expected normal range. Please continue with your current plan of care and let us know if you have any questions or concerns.    Regards,  Stewart Stevens MD

## 2021-05-06 NOTE — RESULT ENCOUNTER NOTE
Dear Mariia,     Here are your recent results which are within the expected normal range--not consistent with rheumatoid arthritis. Please continue with your current plan of care and let us know if you have any questions or concerns.    Regards,  Stewart Stevens MD

## 2021-05-08 ENCOUNTER — HEALTH MAINTENANCE LETTER (OUTPATIENT)
Age: 76
End: 2021-05-08

## 2021-05-12 ENCOUNTER — TELEPHONE (OUTPATIENT)
Dept: OBGYN | Facility: CLINIC | Age: 76
End: 2021-05-12

## 2021-05-12 NOTE — TELEPHONE ENCOUNTER
lvm for patient to call scheduling,pt needs apt with Dr. Corona, new patient, referred by Dr. Stevens.

## 2021-06-03 ENCOUNTER — PRE VISIT (OUTPATIENT)
Dept: NEUROLOGY | Facility: CLINIC | Age: 76
End: 2021-06-03

## 2021-06-03 NOTE — TELEPHONE ENCOUNTER
FUTURE VISIT INFORMATION      FUTURE VISIT INFORMATION:    Date: 6/11/2021    Time: 9am    Location: [facility]  REFERRAL INFORMATION:    Referring provider:  Dr. Stevens    Referring providers clinic:  [facility]     Reason for visit/diagnosis  Loss Of Balance, Tinnitus Bilateral    RECORDS REQUESTED FROM:       Clinic name Comments Records Status Imaging Status   Internal Dr. Stevens-4/23/2021, 1/22/2021    MR Brain-2/5/2021 Epic PACS

## 2021-06-11 ENCOUNTER — OFFICE VISIT (OUTPATIENT)
Dept: NEUROLOGY | Facility: CLINIC | Age: 76
End: 2021-06-11
Attending: FAMILY MEDICINE
Payer: COMMERCIAL

## 2021-06-11 VITALS — DIASTOLIC BLOOD PRESSURE: 84 MMHG | SYSTOLIC BLOOD PRESSURE: 171 MMHG | OXYGEN SATURATION: 95 % | HEART RATE: 79 BPM

## 2021-06-11 DIAGNOSIS — G62.9 NEUROPATHY: Primary | ICD-10-CM

## 2021-06-11 DIAGNOSIS — G62.9 NEUROPATHY: ICD-10-CM

## 2021-06-11 LAB
DEPRECATED CALCIDIOL+CALCIFEROL SERPL-MC: 33 UG/L (ref 20–75)
FOLATE SERPL-MCNC: 18.3 NG/ML
IRON SATN MFR SERPL: 31 % (ref 15–46)
IRON SERPL-MCNC: 107 UG/DL (ref 35–180)
TIBC SERPL-MCNC: 345 UG/DL (ref 240–430)
TSH SERPL DL<=0.005 MIU/L-ACNC: 1.56 MU/L (ref 0.4–4)
VIT B12 SERPL-MCNC: 578 PG/ML (ref 193–986)

## 2021-06-11 PROCEDURE — 83550 IRON BINDING TEST: CPT | Performed by: PATHOLOGY

## 2021-06-11 PROCEDURE — 83540 ASSAY OF IRON: CPT | Performed by: PATHOLOGY

## 2021-06-11 PROCEDURE — 99000 SPECIMEN HANDLING OFFICE-LAB: CPT | Performed by: PATHOLOGY

## 2021-06-11 PROCEDURE — 84446 ASSAY OF VITAMIN E: CPT | Mod: 90 | Performed by: PATHOLOGY

## 2021-06-11 PROCEDURE — 84207 ASSAY OF VITAMIN B-6: CPT | Mod: 90 | Performed by: PATHOLOGY

## 2021-06-11 PROCEDURE — 99205 OFFICE O/P NEW HI 60 MIN: CPT | Performed by: PSYCHIATRY & NEUROLOGY

## 2021-06-11 PROCEDURE — 82607 VITAMIN B-12: CPT | Performed by: PATHOLOGY

## 2021-06-11 PROCEDURE — 82306 VITAMIN D 25 HYDROXY: CPT | Mod: 90 | Performed by: PATHOLOGY

## 2021-06-11 PROCEDURE — 84425 ASSAY OF VITAMIN B-1: CPT | Mod: 90 | Performed by: PATHOLOGY

## 2021-06-11 PROCEDURE — 36415 COLL VENOUS BLD VENIPUNCTURE: CPT | Performed by: PATHOLOGY

## 2021-06-11 PROCEDURE — 84630 ASSAY OF ZINC: CPT | Mod: 90 | Performed by: PATHOLOGY

## 2021-06-11 PROCEDURE — 82525 ASSAY OF COPPER: CPT | Mod: 90 | Performed by: PATHOLOGY

## 2021-06-11 PROCEDURE — 82746 ASSAY OF FOLIC ACID SERUM: CPT | Mod: 90 | Performed by: PATHOLOGY

## 2021-06-11 PROCEDURE — 84443 ASSAY THYROID STIM HORMONE: CPT | Performed by: PATHOLOGY

## 2021-06-11 NOTE — LETTER
6/11/2021       RE: Lashell Hogue  [address]  [place] [address]     Dear Colleague,    Thank you for referring your patient, Lashell Hogue, to the [facility]OLOG[facility] [place] [facility]. Please see a copy of my visit note below.    [facility] Neurology Consultation    Lashell Hogue MRN# [id number]   Age: 75 year old YOB: 1945     Requesting physician: Stewart Tidwell     Reason for Consultation: tinnitus, imbalance      History of Presenting Symptoms:   Lashell Hogue is a 75 year old female who presents today for evaluation of tinnitus and imbalance. The patient was seen 1/22/2021 with her PCP and reported a gradual onset of worsened balance when moving/walking since the fall of 2020.  She described a sensation of feeling off (as if turning her head too quickly) but an absence of nausea/vision changes/spinning.  The symptoms occurred 40-50% of the time when walking, but not when standing up quickly or turning her head quickly.  She stopped some of her HTN medications to see if this would help, and it didn't (stopped lasix).  The patient described also having soreness and a sensation like she is walking on arron for 3 months during that same visit, usually only occurring when walking or when sitting for an hour or so.  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  On follow up with her PCP (4/23/2021) she reported similar events in 2018, but that her current episodes now included tinnitus and were more severe.  When treating her symptoms in the past, she was diagnosed with  sensorineural hearing loss at [facility], and tried vestibular PT with improvement.    Today, the patent notes that when she took the amlodipine 5 mg she felt out of body experiences, felt sick, and felt like she needed to lay down all the time.  These symptoms improved when altering her medication down 2.5 mg, her symptoms improved. These events were about 2-3 years ago.      Since 10/2020, she has had questionable imbalance.  No vertigo, no head position dizziness, no nausea, no vomiting, no headache, no vision changes, no improvement with eyes closed.  Her symptoms usually come on with walking or when standing for a little amount of time, especially when on un-even surfaces.  She does report numbness in her right foot, mostly on the sole and on the great toe.  She she steps off her of her right foot by hyper-extending her great toe, she has localized pain in this toe.  When placing a blanket over her feet at night, her feet are sensitive to this touch (the weight of the blanket feels uncomfortable).  This sensory issue is going on since this winter 2020.  Her feet always feel warm.        Past Medical History:   HTN  Christy esophagus  Chronic diarrhea  Chronic lower back pain - facet arthropathy, dsc disease - follows with [facility] and [facility] orthopedics (using injection, multiple medications used in the same)  HLD     Social History:     Tobacco Use     Smoking status: Former Smoker     Packs/day: 0.00     Smokeless tobacco: Never Used   Substance Use Topics     Alcohol use: Yes     Comment: red wine daily      Drug use: No      Medications:     Current Outpatient Medications   Medication Sig     amLODIPine (NORVASC) 5 MG tablet Take 0.5 tablets (2.5 mg) by mouth daily (Patient taking differently: Take 2.5 mg by mouth 2 times daily )     aspirin (ASA) 81 MG tablet Take 1 tablet (81 mg) by mouth daily     atorvastatin (LIPITOR) 10 MG tablet Take 1 tablet (10 mg) by mouth daily     cholestyramine (QUESTRAN) 4  GM/DOSE powder Take 4 g by mouth daily For additional refills, please schedule a follow-up appointment at [phone]     co-enzyme Q-10 100 MG CAPS capsule Take 100 mg by mouth daily     Loperamide HCl (IMODIUM OR)      losartan (COZAAR) 25 MG tablet Take 1 tablet (25 mg) by mouth daily . DOSE CHANGE.     metroNIDAZOLE (METROCREAM) 0.75 % external cream Apply topically 2 times daily     psyllium (METAMUCIL) 28.3 % packet Take 1 packet by mouth daily     vitamin D3 (CHOLECALCIFEROL) 1000 units (25 mcg) tablet Take by mouth daily      Physical Exam:   Vitals: BP (!) 171/84   Pulse 79   SpO2 95%    General: Seated comfortably in no acute distress.  HEENT: Neck supple with normal range of motion. No paracervical muscle tenderness or tightness.  Optic discs sharp and vasculature normal on funduscopic exam.   Heart: Regular rate  Extremities: no pitting edema. Ulnar deviated index finger and everted deviated great toes.  Skin: No rashes  Neurologic:     Mental Status: Fully alert, attentive and oriented. Speech clear and fluent, no paraphasic errors.     Cranial Nerves: Visual fields intact. PERRL. EOMI with normal smooth pursuit. Facial sensation intact/symmetric. Facial movements symmetric. Hearing not formally tested but intact to conversation. Palate elevation symmetric, uvula midline. No dysarthria. Shoulder shrug strong bilaterally. Tongue protrusion midline.     Motor: No tremors or other abnormal movements observed. Muscle tone normal throughout. No pronator drift. Normal/symmetric rapid finger tapping. Strength 5/5 throughout upper and lower extremities.     Deep Tendon Reflexes: 2+/symmetric throughout upper extremities, 2+ in patellar b/l. 1+ on right achilles, absent on left.  No clonus. Toes downgoing bilaterally.     Sensory: Intact/symmetric to light touch, pinprick, temperature, vibration on upper extremities.  Errors in reoprting soft touch and pinprick differentiation on distal toes and lateral foot  surface (not heel, leg, or dorsal aspect of foot).  Vibration sensation duration at toes was 5 seconds b/l, and at MM was 7-8 seconds b/l, 14 seconds at the knees b/l. . Negative Romberg (some minor sway, no falls)      Coordination: Finger-nose-finger intact without dysmetria. Rapid alternating movements intact/symmetric with normal speed and rhythm.     Gait: Stands easily from seated position. Tandem gait is with repeated need to have half-steps during stride or inadvertant steps out to the side.  No falls in either direction or forward.         Assessment and Plan:   Assessment:  Sensory predominant polyneuropathy (distal, symmetric) with sensorimotor ataxia  Recurrent dizziness, undifferentiated    The patient's imbalance is somewhat different than her prior episodes in 2018, in that it isn't responsive to changes in medications revolving around her blood pressure.  Her symptoms are non-vertiginous, and unlikely related to BPPV or a peripheral insult to the vestibular nerve or vestibular apparatus given exam today.  Overall, her exam and reporting is more suggestive for a peripheral sensory polyneuropathy of unclear etiology.  Her onset of symptoms is relatively subacute, and lacking common etiology (DMII, Alcohol abuse history).  Her symptoms do not seem orthostatic in nature, or related to small fiber neuropathy causing a dysautonomia, and she has no signs on exam of parkinsonism.  I would try to define her neuropathy with reversible causes that can come on acutely/subacutely through serum studies and an EMG.  If negative, then she likely has some idiopathic polyneuropathy and can benefit from dysesthesia treatment by a neuropathic agent (Gabapentin, lyrica, duloxetine). She will also benefit from seeing PT for imbalance related to her hesitant gait from a sensorimotor ataxia brought about from her likely neuropathy.     Plan:  - EMG of b/l lower extremities  - B12, B6, Folate, SPEP, Immunofixation, TSH,  A1c  - PT for imbalance    Follow up in Neurology clinic in 8 weeks or earlier as needed should new concerns arise.    MICHELLE Gibson D.O.   of Neurology    Total time today (66 min) in this patient encounter was spent on pre-charting, counseling and/or coordination of care. We reviewed diagnostic results, impressions, and discussed other possible tests if symptoms do not improve. We discussed the implications of the diagnosis, as well as risks and benefits of management options. We reviewed treatment instructions and our scheduled follow-up as specified in the discharge plan. We also discussed the importance of compliance with the chosen course of treatment. The patient is in agreement with this plan and has no further questions.        Again, thank you for allowing me to participate in the care of your patient.      Sincerely,    Johan Gibson, DO

## 2021-06-11 NOTE — PROGRESS NOTES
[facility] Neurology Consultation    Lashell Hogue MRN# [id number]   Age: 75 year old YOB: 1945     Requesting physician: Stewart Tidwell     Reason for Consultation: tinnitus, imbalance      History of Presenting Symptoms:   Lashell Hogue is a 75 year old female who presents today for evaluation of tinnitus and imbalance. The patient was seen 1/22/2021 with her PCP and reported a gradual onset of worsened balance when moving/walking since the fall of 2020.  She described a sensation of feeling off (as if turning her head too quickly) but an absence of nausea/vision changes/spinning.  The symptoms occurred 40-50% of the time when walking, but not when standing up quickly or turning her head quickly.  She stopped some of her HTN medications to see if this would help, and it didn't (stopped lasix).  The patient described also having soreness and a sensation like she is walking on arron for 3 months during that same visit, usually only occurring when walking or when sitting for an hour or so.  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  On follow up with her PCP (4/23/2021) she reported similar events in 2018, but that her current episodes now included tinnitus and were more severe.  When treating her symptoms in the past, she was diagnosed with sensorineural hearing loss at [facility], and tried vestibular PT with improvement.    Today, the patent notes that when she took the amlodipine 5 mg she felt out of body experiences, felt sick, and felt like she needed to lay down all the time.  These symptoms improved when altering her medication down 2.5 mg, her symptoms improved. These  events were about 2-3 years ago.      Since 10/2020, she has had questionable imbalance.  No vertigo, no head position dizziness, no nausea, no vomiting, no headache, no vision changes, no improvement with eyes closed.  Her symptoms usually come on with walking or when standing for a little amount of time, especially when on un-even surfaces.  She does report numbness in her right foot, mostly on the sole and on the great toe.  She she steps off her of her right foot by hyper-extending her great toe, she has localized pain in this toe.  When placing a blanket over her feet at night, her feet are sensitive to this touch (the weight of the blanket feels uncomfortable).  This sensory issue is going on since this winter 2020.  Her feet always feel warm.        Past Medical History:   HTN  Christy esophagus  Chronic diarrhea  Chronic lower back pain - facet arthropathy, dsc disease - follows with [facility] and [facility] orthopedics (using injection, multiple medications used in the same)  HLD     Social History:     Tobacco Use     Smoking status: Former Smoker     Packs/day: 0.00     Smokeless tobacco: Never Used   Substance Use Topics     Alcohol use: Yes     Comment: red wine daily      Drug use: No      Medications:     Current Outpatient Medications   Medication Sig     amLODIPine (NORVASC) 5 MG tablet Take 0.5 tablets (2.5 mg) by mouth daily (Patient taking differently: Take 2.5 mg by mouth 2 times daily )     aspirin (ASA) 81 MG tablet Take 1 tablet (81 mg) by mouth daily     atorvastatin (LIPITOR) 10 MG tablet Take 1 tablet (10 mg) by mouth daily     cholestyramine (QUESTRAN) 4 GM/DOSE powder Take 4 g by mouth daily For additional refills, please schedule a follow-up appointment at [phone]     co-enzyme Q-10 100 MG CAPS capsule Take 100 mg by mouth daily     Loperamide HCl (IMODIUM OR)      losartan (COZAAR) 25 MG tablet Take 1 tablet (25 mg) by mouth daily . DOSE CHANGE.     metroNIDAZOLE (METROCREAM) 0.75 %  external cream Apply topically 2 times daily     psyllium (METAMUCIL) 28.3 % packet Take 1 packet by mouth daily     vitamin D3 (CHOLECALCIFEROL) 1000 units (25 mcg) tablet Take by mouth daily      Physical Exam:   Vitals: BP (!) 171/84   Pulse 79   SpO2 95%    General: Seated comfortably in no acute distress.  HEENT: Neck supple with normal range of motion. No paracervical muscle tenderness or tightness.  Optic discs sharp and vasculature normal on funduscopic exam.   Heart: Regular rate  Extremities: no pitting edema. Ulnar deviated index finger and everted deviated great toes.  Skin: No rashes  Neurologic:     Mental Status: Fully alert, attentive and oriented. Speech clear and fluent, no paraphasic errors.     Cranial Nerves: Visual fields intact. PERRL. EOMI with normal smooth pursuit. Facial sensation intact/symmetric. Facial movements symmetric. Hearing not formally tested but intact to conversation. Palate elevation symmetric, uvula midline. No dysarthria. Shoulder shrug strong bilaterally. Tongue protrusion midline.     Motor: No tremors or other abnormal movements observed. Muscle tone normal throughout. No pronator drift. Normal/symmetric rapid finger tapping. Strength 5/5 throughout upper and lower extremities.     Deep Tendon Reflexes: 2+/symmetric throughout upper extremities, 2+ in patellar b/l. 1+ on right achilles, absent on left.  No clonus. Toes downgoing bilaterally.     Sensory: Intact/symmetric to light touch, pinprick, temperature, vibration on upper extremities.  Errors in reoprting soft touch and pinprick differentiation on distal toes and lateral foot surface (not heel, leg, or dorsal aspect of foot).  Vibration sensation duration at toes was 5 seconds b/l, and at MM was 7-8 seconds b/l, 14 seconds at the knees b/l. . Negative Romberg (some minor sway, no falls)      Coordination: Finger-nose-finger intact without dysmetria. Rapid alternating movements intact/symmetric with normal speed  and rhythm.     Gait: Stands easily from seated position. Tandem gait is with repeated need to have half-steps during stride or inadvertant steps out to the side.  No falls in either direction or forward.         Assessment and Plan:   Assessment:  Sensory predominant polyneuropathy (distal, symmetric) with sensorimotor ataxia  Recurrent dizziness, undifferentiated    The patient's imbalance is somewhat different than her prior episodes in 2018, in that it isn't responsive to changes in medications revolving around her blood pressure.  Her symptoms are non-vertiginous, and unlikely related to BPPV or a peripheral insult to the vestibular nerve or vestibular apparatus given exam today.  Overall, her exam and reporting is more suggestive for a peripheral sensory polyneuropathy of unclear etiology.  Her onset of symptoms is relatively subacute, and lacking common etiology (DMII, Alcohol abuse history).  Her symptoms do not seem orthostatic in nature, or related to small fiber neuropathy causing a dysautonomia, and she has no signs on exam of parkinsonism.  I would try to define her neuropathy with reversible causes that can come on acutely/subacutely through serum studies and an EMG.  If negative, then she likely has some idiopathic polyneuropathy and can benefit from dysesthesia treatment by a neuropathic agent (Gabapentin, lyrica, duloxetine). She will also benefit from seeing PT for imbalance related to her hesitant gait from a sensorimotor ataxia brought about from her likely neuropathy.     Plan:  - EMG of b/l lower extremities  - B12, B6, Folate, SPEP, Immunofixation, TSH, A1c  - PT for imbalance    Follow up in Neurology clinic in 8 weeks or earlier as needed should new concerns arise.    MICHELLE Gibson D.O.   of Neurology    Total time today (66 min) in this patient encounter was spent on pre-charting, counseling and/or coordination of care. We reviewed diagnostic results, impressions,  and discussed other possible tests if symptoms do not improve. We discussed the implications of the diagnosis, as well as risks and benefits of management options. We reviewed treatment instructions and our scheduled follow-up as specified in the discharge plan. We also discussed the importance of compliance with the chosen course of treatment. The patient is in agreement with this plan and has no further questions.

## 2021-06-11 NOTE — PATIENT INSTRUCTIONS
I suspect you have a distal symmetric peripheral neuropathy and it is causing some imbalance.  We will try to define why this neuropathy is present/or if it is present at all.  To do this, we will need both blood tests and a test called an EMG (nerve and muscle test of the legs)    - EMG of b/l lower extremities  - B12, B6, Folate, SPEP, Immunofixation, TSH, A1c, Copper, Zinc, Vitamin D, Vitamin E, B1, Iron and Iron binding  - PT for gait and imbalance

## 2021-06-14 LAB
A-TOCOPHEROL VIT E SERPL-MCNC: 14.6 MG/L (ref 5.5–18)
BETA+GAMMA TOCOPHEROL SERPL-MCNC: 1.8 MG/L (ref 0–6)
COPPER SERPL-MCNC: 128.2 UG/DL (ref 80–155)
ZINC SERPL-MCNC: 92 UG/DL (ref 60–120)

## 2021-06-15 LAB — VIT B6 SERPL-MCNC: 67 NMOL/L (ref 20–125)

## 2021-06-16 ENCOUNTER — OFFICE VISIT (OUTPATIENT)
Dept: NEUROLOGY | Facility: CLINIC | Age: 76
End: 2021-06-16
Attending: PSYCHIATRY & NEUROLOGY
Payer: COMMERCIAL

## 2021-06-16 DIAGNOSIS — R26.9 GAIT DISORDER: Primary | ICD-10-CM

## 2021-06-16 DIAGNOSIS — G62.9 NEUROPATHY: ICD-10-CM

## 2021-06-16 LAB — VIT B1 BLD-MCNC: 124 NMOL/L (ref 70–180)

## 2021-06-16 PROCEDURE — 95910 NRV CNDJ TEST 7-8 STUDIES: CPT | Performed by: PSYCHIATRY & NEUROLOGY

## 2021-06-16 NOTE — LETTER
6/16/2021       RE: Lashell Hogue  [address]  [facility] [address]     Dear Colleague,    Thank you for referring your patient, Lashell Hogue, to the [facility] EMG CLINIC [place] [facility]. Please see a copy of my visit note below.    [facility]  Electrodiagnostic Laboratory    Nerve Conduction & EMG Report          Patient:       Lashell Hogue  Patient ID:    [id number]  Gender:        Female  YOB: 1945  Age:           75 Years 9 Months        History & Examination:  Lashell Hogue is a 75 year old woman who reports listing to one side while walking and throbbing in the feet with weight-bearing. She is referred for evaluation of possible sensory-predominant polyneuropathy.    Techniques:   Motor and sensory conduction studies were done with surface recording electrodes. EMG was done with a concentric needle electrode.     Results:  Biklateral sural and superficial peroneal sensory conduction studies were normal. Bilateral tibial and right peroneal motor conduction studies were normal. A right tibial F-response study was normal.     Interpretation:  This is a normal study. In particular, there is no electrodiagnostic evidence of polyneuropathy.    Aram Bell M.D.        Sensory NCS      Nerve / Sites Rec. Site Onset Peak NP Amp Ref. PP Amp Dist Uriel Ref. Temp     ms ms  V  V  V cm m/s m/s  C   R SURAL - Lat Mall 60      Calf Ankle 2.40 3.28 7.1 5.0 4.0 14 58.4 38.0 32.7   L SURAL - Lat Mall 60      Calf Ankle 2.86 3.70 5.0 5.0 4.1 14 48.9 38.0 35   R SUP PERONEAL      Lat Leg Parmar 2.34 2.92 8.4  10.7 12.5 53.3 38.0 32.1   L SUP PERONEAL      Lat Leg Parmar 2.29 3.07 6.9  6.0 12.5 54.5 38.0 34.5       Motor NCS      Nerve / Sites Rec. Site Lat Ref. Amp Ref. Rel Amp Dist Uriel Ref. Dur. Area Temp.     ms ms mV mV % cm m/s m/s ms %  C   R DEEP PERONEAL - EDB 60      Ankle EDB 2.97 6.00 2.9 2.0 100  8   5.99 100 33      FibHead EDB 9.74  2.8  95.3 29 42.8 38.0 6.67 85.7 33      Pop Fos EDB 11.61  2.6  90.4 8 42.7 38.0 6.15 84.6 33   R TIBIAL - AH      Ankle AH 2.92 6.00 8.6 4.0 100 8   5.47 100 33.1      Pop Fos AH 11.67  4.5  52.5 36 41.1 38.0 6.51 76.8 33.3   L TIBIAL - AH      Ankle AH 3.65 6.00 7.0 4.0 100 8   4.58 100 33.1       F  Wave      Nerve Min F Lat Max F Lat Mean FLat Temp.    ms ms ms  C   R TIBIAL 44.53 49.22 48.05 33.3                              Again, thank you for allowing me to participate in the care of your patient.      Sincerely,    Aram Bell MD

## 2021-06-16 NOTE — PROGRESS NOTES
[facility]  Electrodiagnostic Laboratory    Nerve Conduction & EMG Report          Patient:       Lashell Hogue  Patient ID:    [id number]  Gender:        Female  YOB: 1945  Age:           75 Years 9 Months        History & Examination:  Lashell Hogue is a 75 year old woman who reports listing to one side while walking and throbbing in the feet with weight-bearing. She is referred for evaluation of possible sensory-predominant polyneuropathy.    Techniques:   Motor and sensory conduction studies were done with surface recording electrodes. EMG was done with a concentric needle electrode.     Results:  Biklateral sural and superficial peroneal sensory conduction studies were normal. Bilateral tibial and right peroneal motor conduction studies were normal. A right tibial F-response study was normal.     Interpretation:  This is a normal study. In particular, there is no electrodiagnostic evidence of polyneuropathy.    Aram Bell M.D.        Sensory NCS      Nerve / Sites Rec. Site Onset Peak NP Amp Ref. PP Amp Dist Uriel Ref. Temp     ms ms  V  V  V cm m/s m/s  C   R SURAL - Lat Mall 60      Calf Ankle 2.40 3.28 7.1 5.0 4.0 14 58.4 38.0 32.7   L SURAL - Lat Mall 60      Calf Ankle 2.86 3.70 5.0 5.0 4.1 14 48.9 38.0 35   R SUP PERONEAL      Lat Leg Parmar 2.34 2.92 8.4  10.7 12.5 53.3 38.0 32.1   L SUP PERONEAL      Lat Leg Parmar 2.29 3.07 6.9  6.0 12.5 54.5 38.0 34.5       Motor NCS      Nerve / Sites Rec. Site Lat Ref. Amp Ref. Rel Amp Dist Uriel Ref. Dur. Area Temp.     ms ms mV mV % cm m/s m/s ms %  C   R DEEP PERONEAL - EDB 60      Ankle EDB 2.97 6.00 2.9 2.0 100 8   5.99 100 33      FibHead EDB 9.74  2.8  95.3 29 42.8 38.0 6.67 85.7 33      Pop Fos EDB 11.61  2.6  90.4 8 42.7 38.0 6.15 84.6 33   R TIBIAL - AH      Ankle AH 2.92 6.00 8.6 4.0 100 8   5.47 100 33.1      Pop Fos AH 11.67  4.5  52.5 36 41.1 38.0 6.51 76.8 33.3   L TIBIAL - AH      Ankle AH 3.65 6.00 7.0 4.0 100 8   4.58 100 33.1        F  Wave      Nerve Min F Lat Max F Lat Mean FLat Temp.    ms ms ms  C   R TIBIAL 44.53 49.22 48.05 33.3

## 2021-08-12 ENCOUNTER — VIRTUAL VISIT (OUTPATIENT)
Dept: NEUROLOGY | Facility: CLINIC | Age: 76
End: 2021-08-12
Payer: COMMERCIAL

## 2021-08-12 DIAGNOSIS — R26.89 IMBALANCE: Primary | ICD-10-CM

## 2021-08-12 DIAGNOSIS — R26.9 GAIT DIFFICULTY: ICD-10-CM

## 2021-08-12 PROCEDURE — 99212 OFFICE O/P EST SF 10 MIN: CPT | Mod: 95 | Performed by: PSYCHIATRY & NEUROLOGY

## 2021-08-12 NOTE — PROGRESS NOTES
[facility] Neurology Follow Up Visit    Lashell Hogue MRN# [id number]   Age: 75 year old YOB: 1945     Brief history of symptoms: The patient was initially seen in neurologic consultation on 6/11/2021 for evaluation of tinnitus and imbalance. Please see the comprehensive neurologic consultation notes from those dates in the Epic records for details. Overall impression was for ongoing imbalance since 10/2020 which worsened during walking.  Prior imaging wasn't revealing for an etiology (2/5/2021 MRI/MRA).  Exam on our visit was suggestive for a sensory predominant distal symmetric polyneuropathy with concerns for small fiber neuropathy.  EMG on 6/16/2021 was normal.  Consideration towards treatment of gait with PT, and paresthesias reported with gabapentin/lyrica/duloxetine was to be made.  Serum testing was unremarkable (b12, B1, D, TsH, Zinc, Copper, folate, Iron and iron binding).    Interval history: The patient has had some improvement in her paresthesias, and indicates her feet are sensitive to shoe pressure.  Her balance is stable, and still feels somewhat uncomfortable with walking in the dark on un-even surfaces.  She feels that physical therapy a year ago was helpful for low back related pain, but hasn't gone to previously recommended gait/imbalance.       Physical Exam:   General: Seated comfortably in no acute distress. Video is blurred.  HEENT: Neck supple with normal range of motion.   Neurologic:     Mental Status: Fully alert, attentive and oriented. Speech clear and fluent, no paraphasic errors.     Cranial Nerves: EOMI with normal smooth pursuit. Facial movements symmetric.  No dysarthria.     Motor: No tremors or other abnormal movements observed.          Assessment and Plan:   Assessment:  Hesitant gait with suspected idiopathic small fiber neuropathy    The patient has had continued improvement on her imbalance with adjusting the type of shoes she wears, and with utilizing  online resources for gait/imbalance physical therapy.  She is fearful of attending in-person PT due to COVID19 concerns, but will utilize this resource if she feels it is appropriate in the future.  Otherwise, she has no new symptoms and is not interested in any medication for atypical paresthesias (not truly present today or recently).     Plan:  PT offered again, will aide in making an appointment should the patient request one through [facility]    Follow up in Neurology clinic as needed, or earlier should new concerns arise.    MICHELLE Gibson D.O.   of Neurology    Total time today (15 min) in this patient encounter was spent on pre-charting, counseling and/or coordination of care.

## 2021-08-12 NOTE — LETTER
8/12/2021       RE: Lashell Hogue  [address]  [facility] [address]     Dear Colleague,    Thank you for referring your patient, Lashell Hogue, to the [facility] NEUROLOGY CLINIC [place] [facility]. Please see a copy of my visit note below.    [facility] Neurology Follow Up Visit    Lashell Hogue MRN# [id number]   Age: 75 year old YOB: 1945     Brief history of symptoms: The patient was initially seen in neurologic consultation on 6/11/2021 for evaluation of tinnitus and imbalance. Please see the comprehensive neurologic consultation notes from those dates in the Epic records for details. Overall impression was for ongoing imbalance since 10/2020 which worsened during walking.  Prior imaging wasn't revealing for an etiology (2/5/2021 MRI/MRA).  Exam on our visit was suggestive for a sensory predominant distal symmetric polyneuropathy with concerns for small fiber neuropathy.  EMG on 6/16/2021 was normal.  Consideration towards treatment of gait with PT, and paresthesias reported with gabapentin/lyrica/duloxetine was to be made.  Serum testing was unremarkable (b12, B1, D, TsH, Zinc, Copper, folate, Iron and iron binding).    Interval history: The patient has had some improvement in her paresthesias, and indicates her feet are sensitive to shoe pressure.  Her balance is stable, and still feels somewhat uncomfortable with walking in the dark on un-even surfaces.  She feels that physical therapy a year ago was helpful for low back related pain, but hasn't gone to previously recommended gait/imbalance.       Physical Exam:   General: Seated comfortably in no acute distress. Video is blurred.  HEENT: Neck supple with normal range of motion.   Neurologic:     Mental Status: Fully alert, attentive and oriented. Speech clear and fluent, no paraphasic errors.     Cranial Nerves: EOMI with normal smooth pursuit.  Pharmacist Admission Medication Reconciliation Pending Note    Prior to Admission Medications were reviewed by the pharmacist and pended for provider review during admission medication reconciliation.    Medications were pended by the pharmacist at this time as follows:    Pended Admission Order Reconciliation Actions - Lisbet Ghotra, [facility] 7/10/2020 12:25 PM     Order Name Action Reordered As    PROAIR  (90 Base) MCG/ACT inhaler Order for Admission albuterol inhaler 2 puff    Syringe 23G X 1\" 3 ML Misc Do Not Order for Admission     citalopram (CELEXA) 40 MG tablet Order for Admission citalopram (CeleXA) tablet 40 mg    Elastic Bandages & Supports (MEDICAL COMPRESSION STOCKINGS) Misc Do Not Order for Admission     allopurinol (ZYLOPRIM) 100 MG tablet Order for Admission allopurinol (ZYLOPRIM) tablet 100 mg    hydrochlorothiazide (HYDRODIURIL) 25 MG tablet Order for Admission hydrochlorothiazide (HYDRODIURIL) tablet 25 mg    topiramate (TOPAMAX) 50 MG tablet Order for Admission topiramate (TOPAMAX) tablet 50 mg    sildenafil (VIAGRA) 50 MG tablet Do Not Order for Admission     testosterone cypionate (DEPO-TESTOSTERONE) 200 MG/ML injectable solution Order for Admission testosterone cypionate (DEPO-TESTOSTERONE) 200 MG/ML injection 200 mg    pantoprazole (PROTONIX) 40 MG tablet Order for Admission pantoprazole (PROTONIX) EC tablet 40 mg            Orders Pended To Continue For Hospital Stay     ID Description Pended By When Reason    [id number] allopurinol (ZYLOPRIM) [facility] 07/10[facility]20 1225     [id number] citalopram (CeleXA) tablet 40 [facility]ILY [facility] 07/10/20 1225     [id number] hydrochlorothiazide (HYDRODIURIL) tablet 25 mg-DAILY [facility] 07/10/20 1225     [id number] pantoprazole (PROTONIX) EC tablet 40 mg-DAILY [facility] 07/10/20 1225     [id number] albuterol inhaler 2 puff-EVERY 4 HOURS PRN [facility] 07/10/20 1225      [id number] testosterone cypionate (DEPO-TESTOSTERONE) 200 MG/ML injection 200 mg-EVERY 21 DAYS Lisbet Ghotra RP 07/10/20 1225     [id number] topiramate (TOPAMAX) tablet 50 mg-AT BEDTIME Lisbet Ghotra [facility] 07/10/20 1225             Pharmacist Notations:     Metformin not addressed since only 1 blood sugar reading  Phentermine is not addressed-it is non formulary  Diclofenac gel-is non-formulary, we carry patches if needed.    Last edited by Lisbet Ghotra RPH on 07/10/20 at 1226          Orders that are ultimately reconciled and signed during admission medication reconciliation may differ from the pended actions above.    Please contact the pharmacist for questions.    Trixie Ghotra RPH  7/10/2020 12:28 PM   Facial movements symmetric.  No dysarthria.     Motor: No tremors or other abnormal movements observed.          Assessment and Plan:   Assessment:  Hesitant gait with suspected idiopathic small fiber neuropathy    The patient has had continued improvement on her imbalance with adjusting the type of shoes she wears, and with utilizing online resources for gait/imbalance physical therapy.  She is fearful of attending in-person PT due to COVID19 concerns, but will utilize this resource if she feels it is appropriate in the future.  Otherwise, she has no new symptoms and is not interested in any medication for atypical paresthesias (not truly present today or recently).     Plan:  PT offered again, will aide in making an appointment should the patient request one through [facility]    Follow up in Neurology clinic as needed, or earlier should new concerns arise.    MICHELLE Gibson D.O.   of Neurology    Total time today (15 min) in this patient encounter was spent on pre-charting, counseling and/or coordination of care.     Chief Complaint   Patient presents with     RECHECK     VIDEO VISIT RETURN     Again, thank you for allowing me to participate in the care of your patient.      Sincerely,    Johan Gibson, DO

## 2021-08-12 NOTE — PROGRESS NOTES
Mariia is a 75 year old who is being evaluated via a billable video visit.      How would you like to obtain your AVS? MyChart  If the video visit is dropped, the invitation should be resent by: Send to e-mail at: [email or web address]  Will anyone else be joining your video visit? No      Video Start Time: 0900  Video-Visit Details    Type of service:  Video Visit    Video End Time:9:23 AM    Originating Location (pt. Location): Home    Distant Location (provider location):  [facility] NEUROLOGY [facility]     Platform used for Video Visit: [facility]    Chief Complaint   Patient presents with     RECHECK     VIDEO VISIT RETURN      Maximiliano Badillo

## 2021-10-23 ENCOUNTER — HEALTH MAINTENANCE LETTER (OUTPATIENT)
Age: 76
End: 2021-10-23

## 2022-04-25 DIAGNOSIS — K52.9 CHRONIC DIARRHEA: ICD-10-CM

## 2022-04-27 RX ORDER — CHOLESTYRAMINE 4 G/9G
4 POWDER, FOR SUSPENSION ORAL DAILY
Qty: 360 G | Refills: 3 | Status: SHIPPED | OUTPATIENT
Start: 2022-04-27 | End: 2024-01-10

## 2022-04-27 NOTE — TELEPHONE ENCOUNTER
cholestyramine (QUESTRAN) 4 GM/DOSE powder      Last Written Prescription Date:  12-11-20  END DATE 12-11-21  Last Fill Quantity: 360G,   # refills: 3  Last Office Visit : 2-2-21 ( RTC 3 M)  Future Office visit:  NONE    Routing refill request to provider for review/approval because:  OVERDUE LAB; LIPID PANEL  END DATE 12-11-21      Scheduling has been notified to contact the pt for appointment.

## 2022-04-27 NOTE — TELEPHONE ENCOUNTER
4/27 1st attempt LVM to schedule for follow up for refill on RX . Provided patient with callback [phone]    Yaneli David  Procedure    Cardiology, Rheumatology, GI, Pulmonology, Nephrology Specialties   [facility]  [phone]

## 2022-04-28 NOTE — TELEPHONE ENCOUNTER
LPN sent patient a [employer] message notifying her that she is due for a follow up prior to further refills being given. Per Dr. Mccann, patient okay to follow up with Malik Chen PA-C or SONIA Mendiola. Scheduling number provided in [employer] message.    Katya Castillo LPN

## 2022-05-19 ASSESSMENT — ENCOUNTER SYMPTOMS
STIFFNESS: 1
EYE PAIN: 0
DIFFICULTY URINATING: 0
DIZZINESS: 1
ARTHRALGIAS: 1
HOARSE VOICE: 0
HEADACHES: 0
MYALGIAS: 1
JAUNDICE: 0
MUSCLE CRAMPS: 1
EYE IRRITATION: 1
NECK MASS: 0
SMELL DISTURBANCE: 0
DOUBLE VISION: 0
LOSS OF CONSCIOUSNESS: 0
DYSURIA: 0
BOWEL INCONTINENCE: 0
POOR WOUND HEALING: 1
FLANK PAIN: 0
TINGLING: 0
PARALYSIS: 0
PANIC: 0
DIARRHEA: 1
TASTE DISTURBANCE: 0
DEPRESSION: 1
HEMATURIA: 0
BLOATING: 1
SEIZURES: 0
EYE REDNESS: 1
HEARTBURN: 0
NAIL CHANGES: 1
TREMORS: 0
RECTAL PAIN: 0
JOINT SWELLING: 1
NERVOUS/ANXIOUS: 1
EYE WATERING: 1
SINUS PAIN: 0
ABDOMINAL PAIN: 0
BLOOD IN STOOL: 0
INSOMNIA: 1
TROUBLE SWALLOWING: 0
CONSTIPATION: 1
MUSCLE WEAKNESS: 1
BACK PAIN: 1
NAUSEA: 1
MEMORY LOSS: 0
SORE THROAT: 0
VOMITING: 0
SKIN CHANGES: 0
NUMBNESS: 0
SPEECH CHANGE: 0
WEAKNESS: 0
NECK PAIN: 1
DECREASED CONCENTRATION: 0

## 2022-05-19 ASSESSMENT — ANXIETY QUESTIONNAIRES
2. NOT BEING ABLE TO STOP OR CONTROL WORRYING: MORE THAN HALF THE DAYS
6. BECOMING EASILY ANNOYED OR IRRITABLE: MORE THAN HALF THE DAYS
4. TROUBLE RELAXING: SEVERAL DAYS
1. FEELING NERVOUS, ANXIOUS, OR ON EDGE: SEVERAL DAYS
3. WORRYING TOO MUCH ABOUT DIFFERENT THINGS: SEVERAL DAYS
8. IF YOU CHECKED OFF ANY PROBLEMS, HOW DIFFICULT HAVE THESE MADE IT FOR YOU TO DO YOUR WORK, TAKE CARE OF THINGS AT HOME, OR GET ALONG WITH OTHER PEOPLE?: SOMEWHAT DIFFICULT
7. FEELING AFRAID AS IF SOMETHING AWFUL MIGHT HAPPEN: SEVERAL DAYS
7. FEELING AFRAID AS IF SOMETHING AWFUL MIGHT HAPPEN: SEVERAL DAYS
GAD7 TOTAL SCORE: 8
5. BEING SO RESTLESS THAT IT IS HARD TO SIT STILL: NOT AT ALL
GAD7 TOTAL SCORE: 8
GAD7 TOTAL SCORE: 8

## 2022-05-20 ENCOUNTER — OFFICE VISIT (OUTPATIENT)
Dept: FAMILY MEDICINE | Facility: CLINIC | Age: 77
End: 2022-05-20
Attending: FAMILY MEDICINE
Payer: COMMERCIAL

## 2022-05-20 VITALS
BODY MASS INDEX: 27.32 KG/M2 | WEIGHT: 164 LBS | DIASTOLIC BLOOD PRESSURE: 84 MMHG | HEART RATE: 85 BPM | SYSTOLIC BLOOD PRESSURE: 122 MMHG | HEIGHT: 65 IN

## 2022-05-20 DIAGNOSIS — Z12.31 ENCOUNTER FOR SCREENING MAMMOGRAM FOR MALIGNANT NEOPLASM OF BREAST: Primary | ICD-10-CM

## 2022-05-20 DIAGNOSIS — Z13.220 SCREENING, LIPID: ICD-10-CM

## 2022-05-20 DIAGNOSIS — Z12.11 SCREEN FOR COLON CANCER: ICD-10-CM

## 2022-05-20 DIAGNOSIS — K22.70 BARRETT'S ESOPHAGUS WITHOUT DYSPLASIA: ICD-10-CM

## 2022-05-20 DIAGNOSIS — E78.2 MIXED HYPERLIPIDEMIA: ICD-10-CM

## 2022-05-20 DIAGNOSIS — F32.A DEPRESSION, UNSPECIFIED DEPRESSION TYPE: ICD-10-CM

## 2022-05-20 DIAGNOSIS — R73.03 PRE-DIABETES: ICD-10-CM

## 2022-05-20 DIAGNOSIS — Z00.00 ENCOUNTER FOR PREVENTIVE HEALTH EXAMINATION: ICD-10-CM

## 2022-05-20 PROCEDURE — 99397 PER PM REEVAL EST PAT 65+ YR: CPT | Performed by: FAMILY MEDICINE

## 2022-05-20 PROCEDURE — 99212 OFFICE O/P EST SF 10 MIN: CPT | Mod: 25 | Performed by: FAMILY MEDICINE

## 2022-05-20 RX ORDER — METOPROLOL SUCCINATE 25 MG/1
12.5 TABLET, EXTENDED RELEASE ORAL
COMMUNITY
Start: 2022-05-05

## 2022-05-20 RX ORDER — HYDROCODONE/ACETAMINOPHEN 5 MG-500MG
TABLET ORAL
COMMUNITY
Start: 2021-10-13

## 2022-05-20 RX ORDER — PSYLLIUM HUSK (WITH SUGAR) 3 G/12 G
1 POWDER (GRAM) ORAL
COMMUNITY
End: 2022-05-20

## 2022-05-20 RX ORDER — AMLODIPINE BESYLATE 5 MG/1
2.5 TABLET ORAL
COMMUNITY
Start: 2021-10-13 | End: 2022-05-20

## 2022-05-20 RX ORDER — ATORVASTATIN CALCIUM 10 MG/1
1 TABLET, FILM COATED ORAL DAILY
COMMUNITY
Start: 2021-10-16 | End: 2022-05-20

## 2022-05-20 ASSESSMENT — PAIN SCALES - GENERAL: PAINLEVEL: NO PAIN (0)
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
## 2022-05-24 ENCOUNTER — TELEPHONE (OUTPATIENT)
Dept: GASTROENTEROLOGY | Facility: CLINIC | Age: 77
End: 2022-05-24
Payer: COMMERCIAL

## 2022-05-24 NOTE — TELEPHONE ENCOUNTER
Screening Questions  BlueKIND OF PREP RedLOCATION [review exclusion criteria] GreenSEDATION TYPE  1. Have you had a positive covid test in the last 90 days? N     2. Do you have a legal guardian or medical Power of ?  Are you able to give consent for your medical care?Y (Sedation review/consideration needed)    3. Are you active on mychart? Y    4. What insurance is in the chart? BCBS     3.   Ordering/Referring Provider: Stewart Stevens MD in [facility]    4. BMI 27.3 [BMI OVER 40-EXTENDED PREP]  If greater than 40 review exclusion criteria [PAC APPT IF @ [facility]]        5.  Respiratory Screening :  [If yes to any of the following HOSPITAL setting only]     Do you use daily home oxygen? N    Do you have mod to severe Obstructive Sleep Apnea? N  [OKAY @ [facility] SH PH RI]   Do you have Pulmonary Hypertension? N     Do you have UNCONTROLLED asthma? N        6.   Have you had a heart or lung transplant? N      7.   Are you currently on dialysis? N [ If yes, G-PREP & HOSPITAL setting only]     8.   Do you have chronic kidney disease? N [ If yes, G-PREP ]    9.   Have you had a stroke or Transient ischemic attack (TIA - aka  mini stroke ) within 6 months?  N (If yes, please review exclusion criteria)    10.   In the past 6 months, have you had any heart related issues including cardiomyopathy or heart attack? N           If yes, did it require cardiac stenting or other implantable device? N      11.   Do you have any implantable devices in your body (pacemaker, defib, LVAD)? N (If yes, please review exclusion criteria)    12.   Do you take nitroglycerin? N           If yes, how often? N  (if yes, HOSPITAL setting ONLY)    13.   Are you currently taking any blood thinners? N           [IF YES, INFORM PATIENT TO FOLLOW UP W/ ORDERING PROVIDER FOR BRIDGING INSTRUCTIONS]     14.   Do you have a diagnosis of diabetes? N  [ If yes, G-PREP ]    15.   [FEMALES] Are you currently pregnant?     If yes, how  many weeks?     16.   Are you taking any prescription pain medications on a routine schedule?  N [ If yes, EXTENDED PREP.] [If yes, MAC]    17.   Do you have any chemical dependencies such as alcohol, street drugs, or methadone?  N [If yes, MAC]    18.   Do you have any history of post-traumatic stress syndrome, severe anxiety or history of psychosis?  N  [If yes, MAC]    19.   Do you transfer independently?  Y    20.  On a regular basis do you go 3-5 days between bowel movements? N   [ If yes, EXTENDED PREP.]    21.   Preferred LOCAL Pharmacy for Pre Prescription   [id number] - [place], [place] - [address]       Scheduling Details      Caller : Mariia  (Please ask for phone number if not scheduled by patient)    Type of Procedure Scheduled: EGD & Colonoscopy  Which Colonoscopy Prep was Sent?: miralax  KHORUTS CF PATIENTS & GROEN'S PATIENTS NEEDS EXTENDED PREP  Surgeon: Dada  Date of Procedure: 6/8  Location: [facility] [place] [place]    Sedation Type: CS  Conscious Sedation- Needs  for 6 hours after the procedure  MAC/General-Needs  for 24 hours after procedure    Pre-op Required at [facility] [place], [place] and OR for MAC sedation: n  (advise patient they will need a pre-op prior to procedure -)      Informed patient they will need an adult  Y  Cannot take any type of public or medical transportation alone    Pre-Pro[facility] or Externally: [facility] [place] [place]    Confirmed Nurse will call to complete assessment Y    Additional comments: none

## 2022-05-26 ENCOUNTER — TELEPHONE (OUTPATIENT)
Dept: GASTROENTEROLOGY | Facility: CLINIC | Age: 77
End: 2022-05-26

## 2022-05-26 NOTE — TELEPHONE ENCOUNTER
Pre assessment questions completed for upcoming colonoscopy/EGD procedure scheduled on 6/8/22    COVID test scheduled 6/4/22    Reviewed procedural arrival time 0800 and facility location [facility].    Designated  policy reviewed. Instructed to have someone stay 6 hours post procedure.     Procedure indication: Barretts, hx of polyps     Bowel prep recommendation: Miralax/Magnesium citrate/Dulcolax     Reviewed Miralax prep instructions with patient. No fiber/iron supplements or foods that contain nuts/seeds 7 days prior to procedure.     Anticoagulation/blood thinners? no    Electronic implanted devices? no    Patient verbalized understanding and had no questions or concerns at this time.    Kusum Rivas RN

## 2022-05-30 DIAGNOSIS — Z11.59 ENCOUNTER FOR SCREENING FOR OTHER VIRAL DISEASES: Primary | ICD-10-CM

## 2022-06-04 ENCOUNTER — LAB (OUTPATIENT)
Dept: LAB | Facility: CLINIC | Age: 77
End: 2022-06-04
Payer: COMMERCIAL

## 2022-06-04 DIAGNOSIS — Z11.59 ENCOUNTER FOR SCREENING FOR OTHER VIRAL DISEASES: ICD-10-CM

## 2022-06-04 LAB — SARS-COV-2 RNA RESP QL NAA+PROBE: NEGATIVE

## 2022-06-04 PROCEDURE — 99000 SPECIMEN HANDLING OFFICE-LAB: CPT | Performed by: PATHOLOGY

## 2022-06-04 PROCEDURE — U0003 INFECTIOUS AGENT DETECTION BY NUCLEIC ACID (DNA OR RNA); SEVERE ACUTE RESPIRATORY SYNDROME CORONAVIRUS 2 (SARS-COV-2) (CORONAVIRUS DISEASE [COVID-19]), AMPLIFIED PROBE TECHNIQUE, MAKING USE OF HIGH THROUGHPUT TECHNOLOGIES AS DESCRIBED BY CMS-2020-01-R: HCPCS | Mod: 90 | Performed by: PATHOLOGY

## 2022-06-04 PROCEDURE — U0005 INFEC AGEN DETEC AMPLI PROBE: HCPCS | Mod: 90 | Performed by: PATHOLOGY

## 2022-06-07 NOTE — TELEPHONE ENCOUNTER
"Patient calling, states is having trouble finding gatorade that isn't red and it has so much sugar. States wants to use \"liquid IV\" instead, however does not know the electrolyte content.     Writer explained that the gatorade has the right amount of electrolytes to not cause an imbalance, therefore using a product with unknown electrolyte content is not appropriate.  Discussed gatorade zero and other colors of gatorade.    Patient verbalized understanding and in agreement with plan to buy gatorade for the miralax mix.  "

## 2022-06-08 ENCOUNTER — HOSPITAL ENCOUNTER (OUTPATIENT)
Facility: AMBULATORY SURGERY CENTER | Age: 77
Discharge: HOME | End: 2022-06-08
Attending: INTERNAL MEDICINE
Payer: COMMERCIAL

## 2022-06-08 ENCOUNTER — ANESTHESIA (OUTPATIENT)
Dept: SURGERY | Facility: AMBULATORY SURGERY CENTER | Age: 77
End: 2022-06-08
Payer: COMMERCIAL

## 2022-06-08 ENCOUNTER — ANESTHESIA EVENT (OUTPATIENT)
Dept: SURGERY | Facility: AMBULATORY SURGERY CENTER | Age: 77
End: 2022-06-08
Payer: COMMERCIAL

## 2022-06-08 VITALS
DIASTOLIC BLOOD PRESSURE: 75 MMHG | HEART RATE: 56 BPM | OXYGEN SATURATION: 96 % | TEMPERATURE: 97.8 F | WEIGHT: 159.6 LBS | HEIGHT: 65 IN | SYSTOLIC BLOOD PRESSURE: 127 MMHG | RESPIRATION RATE: 16 BRPM | BODY MASS INDEX: 26.59 KG/M2

## 2022-06-08 VITALS — HEART RATE: 70 BPM

## 2022-06-08 LAB
COLONOSCOPY: NORMAL
UPPER GI ENDOSCOPY: NORMAL

## 2022-06-08 PROCEDURE — 45385 COLONOSCOPY W/LESION REMOVAL: CPT | Mod: PT

## 2022-06-08 PROCEDURE — 88305 TISSUE EXAM BY PATHOLOGIST: CPT | Mod: 26 | Performed by: PATHOLOGY

## 2022-06-08 PROCEDURE — 45390 COLONOSCOPY W/RESECTION: CPT | Mod: 59,PT

## 2022-06-08 PROCEDURE — 88305 TISSUE EXAM BY PATHOLOGIST: CPT | Mod: TC | Performed by: INTERNAL MEDICINE

## 2022-06-08 PROCEDURE — 43239 EGD BIOPSY SINGLE/MULTIPLE: CPT

## 2022-06-08 PROCEDURE — 45380 COLONOSCOPY AND BIOPSY: CPT | Mod: 59,33

## 2022-06-08 RX ORDER — ONDANSETRON 2 MG/ML
4 INJECTION INTRAMUSCULAR; INTRAVENOUS
Status: DISCONTINUED | OUTPATIENT
Start: 2022-06-08 | End: 2022-06-09 | Stop reason: HOSPADM

## 2022-06-08 RX ORDER — SODIUM CHLORIDE, SODIUM LACTATE, POTASSIUM CHLORIDE, CALCIUM CHLORIDE 600; 310; 30; 20 MG/100ML; MG/100ML; MG/100ML; MG/100ML
INJECTION, SOLUTION INTRAVENOUS CONTINUOUS PRN
Status: DISCONTINUED | OUTPATIENT
Start: 2022-06-08 | End: 2022-06-08

## 2022-06-08 RX ORDER — SIMETHICONE
LIQUID (ML) MISCELLANEOUS PRN
Status: DISCONTINUED | OUTPATIENT
Start: 2022-06-08 | End: 2022-06-08 | Stop reason: HOSPADM

## 2022-06-08 RX ORDER — LIDOCAINE 40 MG/G
CREAM TOPICAL
Status: DISCONTINUED | OUTPATIENT
Start: 2022-06-08 | End: 2022-06-09 | Stop reason: HOSPADM

## 2022-06-08 RX ORDER — PROPOFOL 10 MG/ML
INJECTION, EMULSION INTRAVENOUS CONTINUOUS PRN
Status: DISCONTINUED | OUTPATIENT
Start: 2022-06-08 | End: 2022-06-08

## 2022-06-08 RX ORDER — LIDOCAINE HYDROCHLORIDE 20 MG/ML
INJECTION, SOLUTION INFILTRATION; PERINEURAL PRN
Status: DISCONTINUED | OUTPATIENT
Start: 2022-06-08 | End: 2022-06-08

## 2022-06-08 RX ORDER — SODIUM CHLORIDE, SODIUM LACTATE, POTASSIUM CHLORIDE, CALCIUM CHLORIDE 600; 310; 30; 20 MG/100ML; MG/100ML; MG/100ML; MG/100ML
INJECTION, SOLUTION INTRAVENOUS CONTINUOUS
Status: DISCONTINUED | OUTPATIENT
Start: 2022-06-08 | End: 2022-06-09 | Stop reason: HOSPADM

## 2022-06-08 RX ADMIN — PROPOFOL 200 MCG/KG/MIN: 10 INJECTION, EMULSION INTRAVENOUS at 09:36

## 2022-06-08 RX ADMIN — SODIUM CHLORIDE, SODIUM LACTATE, POTASSIUM CHLORIDE, CALCIUM CHLORIDE: 600; 310; 30; 20 INJECTION, SOLUTION INTRAVENOUS at 09:36

## 2022-06-08 RX ADMIN — LIDOCAINE HYDROCHLORIDE 80 MG: 20 INJECTION, SOLUTION INFILTRATION; PERINEURAL at 09:36

## 2022-06-08 ASSESSMENT — ENCOUNTER SYMPTOMS: ORTHOPNEA: 0
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
## 2022-06-08 NOTE — ANESTHESIA CARE TRANSFER NOTE
Patient: Lashell Hogue    Procedure: Procedure(s):  COLONOSCOPY, WITH POLYPECTOMY AND BIOPSY  ESOPHAGOGASTRODUODENOSCOPY, WITH BIOPSY       Diagnosis: Screen for colon cancer [Z12.11]  Christy's esophagus without dysplasia [K22.70]  Diagnosis Additional Information: No value filed.    Anesthesia Type:   MAC     Note:      Level of Consciousness: awake  Oxygen Supplementation: room air    Independent Airway: airway patency satisfactory and stable        Patient transferred to: Phase II    Handoff Report: Identifed the Patient, Identified the Reponsible Provider, Reviewed the pertinent medical history, Discussed the surgical course, Reviewed Intra-OP anesthesia mangement and issues during anesthesia, Set expectations for post-procedure period and Allowed opportunity for questions and acknowledgement of understanding      Vitals:  Vitals Value Taken Time   BP     Temp     Pulse     Resp     SpO2         Electronically Signed By: DONNA Hernandez CRNA  June 8, 2022  11:00 AM

## 2022-06-08 NOTE — ANESTHESIA POSTPROCEDURE EVALUATION
Patient: Lashell Hogue    Procedure: Procedure(s):  COLONOSCOPY, WITH POLYPECTOMY AND BIOPSY  ESOPHAGOGASTRODUODENOSCOPY, WITH BIOPSY       Anesthesia Type:  MAC    Note:  Disposition: Outpatient   Postop Pain Control: Uneventful            Sign Out: Well controlled pain   PONV: No   Neuro/Psych: Uneventful            Sign Out: Acceptable/Baseline neuro status   Airway/Respiratory: Uneventful            Sign Out: Acceptable/Baseline resp. status   CV/Hemodynamics: Uneventful            Sign Out: Acceptable CV status; No obvious hypovolemia; No obvious fluid overload   Other NRE: NONE   DID A NON-ROUTINE EVENT OCCUR? No           Last vitals:  Vitals Value Taken Time   /75 06/08/22 1115   Temp 36.6  C (97.8  F) 06/08/22 1059   Pulse 56 06/08/22 1115   Resp 16 06/08/22 1115   SpO2 96 % 06/08/22 1115       Electronically Signed By: KATELYNN ORTIZ MD  June 8, 2022  12:15 PM
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
## 2022-06-10 LAB
PATH REPORT.COMMENTS IMP SPEC: NORMAL
PATH REPORT.FINAL DX SPEC: NORMAL
PATH REPORT.GROSS SPEC: NORMAL
PATH REPORT.MICROSCOPIC SPEC OTHER STN: NORMAL
PATH REPORT.RELEVANT HX SPEC: NORMAL
PHOTO IMAGE: NORMAL

## 2022-06-13 ENCOUNTER — TELEPHONE (OUTPATIENT)
Dept: OBGYN | Facility: CLINIC | Age: 77
End: 2022-06-13
Payer: COMMERCIAL

## 2022-06-13 NOTE — TELEPHONE ENCOUNTER
Patient called and left message regarding labs and a mammogram that she wanted done before her upcoming appointment.    Spoke with patient to let her know that her lab and mammogram are scheduled for tomorrow. Patient stated after she left the message she called and scheduled those. Patient is all set for tomorrow and had no further questions.

## 2022-06-14 ENCOUNTER — LAB (OUTPATIENT)
Dept: LAB | Facility: CLINIC | Age: 77
End: 2022-06-14
Attending: FAMILY MEDICINE
Payer: COMMERCIAL

## 2022-06-14 ENCOUNTER — HOSPITAL ENCOUNTER (OUTPATIENT)
Dept: MAMMOGRAPHY | Facility: CLINIC | Age: 77
Discharge: HOME | End: 2022-06-14
Attending: FAMILY MEDICINE
Payer: COMMERCIAL

## 2022-06-14 DIAGNOSIS — E78.2 MIXED HYPERLIPIDEMIA: ICD-10-CM

## 2022-06-14 DIAGNOSIS — R73.03 PRE-DIABETES: ICD-10-CM

## 2022-06-14 DIAGNOSIS — Z12.31 ENCOUNTER FOR SCREENING MAMMOGRAM FOR MALIGNANT NEOPLASM OF BREAST: ICD-10-CM

## 2022-06-14 LAB
CHOLEST SERPL-MCNC: 172 MG/DL
FASTING STATUS PATIENT QL REPORTED: YES
HBA1C MFR BLD: 6 % (ref 0–5.6)
HDLC SERPL-MCNC: 75 MG/DL
LDLC SERPL CALC-MCNC: 64 MG/DL
NONHDLC SERPL-MCNC: 97 MG/DL
TRIGL SERPL-MCNC: 166 MG/DL

## 2022-06-14 PROCEDURE — 83036 HEMOGLOBIN GLYCOSYLATED A1C: CPT

## 2022-06-14 PROCEDURE — 80061 LIPID PANEL: CPT

## 2022-06-14 PROCEDURE — 36415 COLL VENOUS BLD VENIPUNCTURE: CPT

## 2022-06-14 PROCEDURE — 77067 SCR MAMMO BI INCL CAD: CPT

## 2022-06-20 NOTE — RESULT ENCOUNTER NOTE
Dear Mariia,     Here are your recent mammogram  results which are within the expected normal range. Please continue with your current plan of care and let us know if you have any questions or concerns.    Regards,  Stewart Stevens MD

## 2022-06-22 ENCOUNTER — OFFICE VISIT (OUTPATIENT)
Dept: FAMILY MEDICINE | Facility: CLINIC | Age: 77
End: 2022-06-22
Attending: FAMILY MEDICINE
Payer: COMMERCIAL

## 2022-06-22 VITALS
SYSTOLIC BLOOD PRESSURE: 116 MMHG | DIASTOLIC BLOOD PRESSURE: 75 MMHG | WEIGHT: 165 LBS | HEART RATE: 83 BPM | BODY MASS INDEX: 27.46 KG/M2

## 2022-06-22 DIAGNOSIS — K22.70 BARRETT'S ESOPHAGUS WITHOUT DYSPLASIA: ICD-10-CM

## 2022-06-22 DIAGNOSIS — I87.2 VENOUS STASIS DERMATITIS OF LOWER EXTREMITY: ICD-10-CM

## 2022-06-22 DIAGNOSIS — N81.11 CYSTOCELE, MIDLINE: ICD-10-CM

## 2022-06-22 DIAGNOSIS — K58.0 IRRITABLE BOWEL SYNDROME WITH DIARRHEA: Primary | ICD-10-CM

## 2022-06-22 DIAGNOSIS — N90.89 LABIAL LESION: ICD-10-CM

## 2022-06-22 DIAGNOSIS — K63.5 POLYP OF COLON, UNSPECIFIED PART OF COLON, UNSPECIFIED TYPE: ICD-10-CM

## 2022-06-22 PROCEDURE — G0463 HOSPITAL OUTPT CLINIC VISIT: HCPCS

## 2022-06-22 PROCEDURE — 99215 OFFICE O/P EST HI 40 MIN: CPT | Performed by: FAMILY MEDICINE

## 2022-06-22 RX ORDER — TRIAMCINOLONE ACETONIDE 5 MG/G
CREAM TOPICAL 2 TIMES DAILY
Qty: 15 G | Refills: 1 | Status: SHIPPED | OUTPATIENT
Start: 2022-06-22 | End: 2022-07-06

## 2022-06-22 NOTE — PROGRESS NOTES
Assessment & Plan   Problem List Items Addressed This Visit        Medium    Christy's esophagus without dysplasia    Relevant Orders    Adult GI  Referral - Consult Only      Other Visit Diagnoses     Irritable bowel syndrome with diarrhea    -  Primary    Relevant Medications    esomeprazole (NEXIUM) 20 MG DR capsule    Other Relevant Orders    Adult GI  Referral - Consult Only    Cystocele, midline        Relevant Orders    Adult Uro/Gyn Referral    Labial lesion        Relevant Orders    Ob/Gyn Referral    Venous stasis dermatitis of lower extremity        Relevant Medications    triamcinolone (ARISTOCORT HP) 0.5 % external cream    Polyp of colon, unspecified part of colon, unspecified type        Relevant Orders    Adult GI  Referral - Consult Only             50 minutes spent on the date of the encounter doing chart review, review of outside records, review of test results, interpretation of tests, patient visit and documentation          Stewart Stevens MD  [facility] [place]    Jose J Chiang is a 76 year old, presenting for the following health issues:  RECHECK (Pre- diabetic check)      HPI     1. Bladder prolapse  Feels something coming out of vaginal canal, worse since colonscopy,increase urge urinate; can replace back.   Recalls had a pessary sometime in past    2. Colonscopy  Had 11 polyps, none precancerous, but 1 sessile adenoma,  needs repeat in 1 year  Now has constipation instead of usual diarrhea since.       3. Hx Zheng's s/p surgery  Endoscopy normal; recommend follow-up with GI to see if need further surveillance    4. Prediabetes  HGbA1c 6.0   Eating more sweets,  Carb craving  Working on reducing sweets, simple carbs  Not drinking alcohol now      5. Mood  Planning to go outside and walk  Would like to see if can do self care to improve mood, and if not,  will consider therapy  6. Sleep  Will wake in night, not only with urination,  and have trouble falling back to sleep    7. Pruritic rash L calf x 2 months, increasing in size, scratches, no benefit OTC hydrocortisone, no pain      Current Outpatient Medications   Medication     atorvastatin (LIPITOR) 10 MG tablet     cholestyramine (QUESTRAN) 4 GM/DOSE powder     co-enzyme Q-10 100 MG CAPS capsule     esomeprazole (NEXIUM) 20 MG DR capsule     Loperamide HCl (IMODIUM OR)     losartan (COZAAR) 25 MG tablet     Lutein 6 MG CAPS     metoprolol succinate ER (TOPROL XL) 25 MG 24 hr tablet     metroNIDAZOLE (METROCREAM) 0.75 % external cream     triamcinolone (ARISTOCORT HP) 0.5 % external cream     vitamin B-12 (CYANOCOBALAMIN) 1000 MCG tablet     vitamin D3 (CHOLECALCIFEROL) 1000 units (25 mcg) tablet     No current facility-administered medications for this visit.         Review of Systems   Per hpi      Objective    /75   Pulse 83   Wt 74.8 kg (165 lb)   Breastfeeding No   BMI 27.46 kg/m    Body mass index is 27.46 kg/m .  Physical Exam   Alert, NAD  Speech RRR, mood is anxious, no psychomotor changes, thought process is appropriate,   Affect is normal. No evidence of suicidality.   L Calf: Erythematous diffuse rash,  Patches with occasional scale, papules; from anterior center of front of lower leg to near knee and down to foot. Trace edema, scattered excoriations  Not warm  Varicose veins bilateral legs  : 5 yellow white papules 2-3mm each on inner labia majora bilaterally   No evidence of prolapse at introitus, even with valsalva  Grade 1-2 cystocele     A/P  1. Cystocele with urinary frequency  Discussed options with patient, will refer to urogynecology    2. Lesions on labia  Likely Flaxton spots, pt. Finds irritating and itching, would like removed--refer gynecology  3. -Venous stasis dermatisis  Counseled patient on nature and treatment   --triamcinolone 0.5% cream bid x 14 days  --cerave mioturizer frequently  --compression stockings all the time  --elevate legs if  sitting  --Stay active--walking regularly   If not improving  in 2-3 weeks, contact me    3. Anxiety and depression, insomnia  Discussed basics of self care, activity, social connnectedness, sleep  .. --discussed sleep hygiene, not staying in bed longer than 20 min  If not improving, refer to therapy    4. PreDM  Reviewed dietary changes above, repeat HgbA1c in 6 months    5. Constipation  --recommend OTC senna plus docusate prn    6. Zheng's history--to follow-up with GI to see if continued surveillance needed.      Follow-up 2 months (aug)

## 2022-06-24 ENCOUNTER — TELEPHONE (OUTPATIENT)
Dept: GASTROENTEROLOGY | Facility: CLINIC | Age: 77
End: 2022-06-24

## 2022-06-24 NOTE — TELEPHONE ENCOUNTER
[facility] Call Center    Phone Message    May a detailed message be left on voicemail: yes     Reason for Call: Other: Pt has referral to be a return Pt with Dr Mccann - Pt does not want to go to  location anymore and prefers [place], please call Pt to discuss scheduling options with another provider at [facility], thanks     Action Taken: Other: GI    Travel Screening: Not Applicable

## 2022-07-06 ENCOUNTER — OFFICE VISIT (OUTPATIENT)
Dept: UROLOGY | Facility: CLINIC | Age: 77
End: 2022-07-06
Attending: FAMILY MEDICINE
Payer: COMMERCIAL

## 2022-07-06 VITALS
DIASTOLIC BLOOD PRESSURE: 85 MMHG | BODY MASS INDEX: 27.82 KG/M2 | HEART RATE: 87 BPM | SYSTOLIC BLOOD PRESSURE: 125 MMHG | WEIGHT: 167 LBS | HEIGHT: 65 IN

## 2022-07-06 DIAGNOSIS — N81.11 CYSTOCELE, MIDLINE: ICD-10-CM

## 2022-07-06 DIAGNOSIS — R39.89 BLADDER PAIN: Primary | ICD-10-CM

## 2022-07-06 LAB
ALBUMIN UR-MCNC: NEGATIVE MG/DL
APPEARANCE UR: CLEAR
BILIRUB UR QL STRIP: NEGATIVE
COLOR UR AUTO: YELLOW
GLUCOSE UR STRIP-MCNC: NEGATIVE MG/DL
HGB UR QL STRIP: ABNORMAL
KETONES UR STRIP-MCNC: NEGATIVE MG/DL
LEUKOCYTE ESTERASE UR QL STRIP: ABNORMAL
NITRATE UR QL: NEGATIVE
PH UR STRIP: 5.5 [PH] (ref 5–8)
SP GR UR STRIP: 1.01 (ref 1–1.03)
UROBILINOGEN UR STRIP-ACNC: 0.2 E.U./DL

## 2022-07-06 PROCEDURE — 99204 OFFICE O/P NEW MOD 45 MIN: CPT | Performed by: OBSTETRICS & GYNECOLOGY

## 2022-07-06 PROCEDURE — 87086 URINE CULTURE/COLONY COUNT: CPT | Performed by: OBSTETRICS & GYNECOLOGY

## 2022-07-06 PROCEDURE — G0463 HOSPITAL OUTPT CLINIC VISIT: HCPCS

## 2022-07-06 PROCEDURE — 81003 URINALYSIS AUTO W/O SCOPE: CPT | Performed by: OBSTETRICS & GYNECOLOGY

## 2022-07-06 ASSESSMENT — PAIN SCALES - GENERAL: PAINLEVEL: MILD PAIN (3)
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
## 2022-07-08 LAB — BACTERIA UR CULT: NORMAL

## 2022-09-20 ENCOUNTER — OFFICE VISIT (OUTPATIENT)
Dept: DERMATOLOGY | Facility: CLINIC | Age: 77
End: 2022-09-20
Payer: COMMERCIAL

## 2022-09-20 DIAGNOSIS — L82.1 SK (SEBORRHEIC KERATOSIS): ICD-10-CM

## 2022-09-20 DIAGNOSIS — I87.2 STASIS DERMATITIS OF BOTH LEGS: Primary | ICD-10-CM

## 2022-09-20 PROCEDURE — 99214 OFFICE O/P EST MOD 30 MIN: CPT | Performed by: DERMATOLOGY

## 2022-09-20 RX ORDER — FLUOCINONIDE 0.5 MG/G
OINTMENT TOPICAL 2 TIMES DAILY
Qty: 60 G | Refills: 6 | Status: SHIPPED | OUTPATIENT
Start: 2022-09-20 | End: 2024-03-11

## 2022-09-20 RX ORDER — FLUOCINONIDE TOPICAL SOLUTION USP, 0.05% 0.5 MG/ML
SOLUTION TOPICAL
Qty: 60 ML | Refills: 3 | Status: CANCELLED | OUTPATIENT
Start: 2022-09-20

## 2022-09-20 ASSESSMENT — PAIN SCALES - GENERAL: PAINLEVEL: NO PAIN (1)

## 2022-09-20 NOTE — PROGRESS NOTES
[facility] Dermatology Note  Encounter Date: Sep 20, 2022  Office Visit     Dermatology Problem List:  1. Photodamage, xerosis  - continue Amlactin  2. Digital mucous cyst  3. Hair thinning  - start minoxidil 5%  4. Rosacea  - Metrogel for 12 weeks, then referral to cosmetics for PDT.  5. Stasis dermatitis  6. Seborrheic keratosis    ____________________________________________    Assessment & Plan:    # Venous stasis dermatitis.   - Discussed with patient regarding nature and etiology of the disease  - Recommend compression stockings   - Will start fluocinonide ointment 0.05% at night to affected areas.   - Will follow up in 6-8 weeks    # Healing wound on left posterior calf.   - Likely slow healing wound of lower extremity.   - Recommend that we continue to monitor     #Seborrheic keratosis, right dorsal hand  - Discussed with patient regarding nature and etiology of the disease.   - Will continue to monitor    Procedures Performed:   None    Follow-up: 6 week(s) in-person, or earlier for new or changing lesions    Staff and Medical Student:     Patient seen and discussed with Dr. Ced Davis MS4  I was present with the medical student who participated in the service and in the documentation of the note. I have verified the history and personally performed the physical exam and medical decision making. I agree with the assessment and plan of care as documented in the note.  Lelo Coughlin MD    ____________________________________________    CC: Derm Problem (Pt reports L lower leg rash, dry lower lip, and bumps on neck, chest, arms, and hands.)    HPI:  Ms. Lashell Hogue is a(n) 77 year old female who presents today as a return patient for an itchy rash of the left lower extremity. Patient was given triamcinolone 0.5% cream by PCP with little improvement.     Patient also concerned about a slow-healing wound on left posterior calf.     Patient is otherwise feeling  well, without additional skin concerns.    Labs:  None reviewed.    Physical Exam:  Vitals: There were no vitals taken for this visit.  SKIN: Focused examination of bilateral lower and upper extremities was performed.  - left lower extremity with red-brown plaque on anterior shin  - Healing wound on left posterior calf  - Bilateral 1+ pedal edema  - 2+ peripheral pulses of bilateral dorsalis pedis  - Flesh-colored stuck on papule on medial right dorsal hand  - No other lesions of concern on areas examined.     Medications:  Current Outpatient Medications   Medication     atorvastatin (LIPITOR) 10 MG tablet     cholestyramine (QUESTRAN) 4 GM/DOSE powder     co-enzyme Q-10 100 MG CAPS capsule     esomeprazole (NEXIUM) 20 MG DR capsule     Loperamide HCl (IMODIUM OR)     losartan (COZAAR) 25 MG tablet     Lutein 6 MG CAPS     metoprolol succinate ER (TOPROL XL) 25 MG 24 hr tablet     vitamin B-12 (CYANOCOBALAMIN) 1000 MCG tablet     vitamin D3 (CHOLECALCIFEROL) 1000 units (25 mcg) tablet     metroNIDAZOLE (METROCREAM) 0.75 % external cream     No current facility-administered medications for this visit.      Past Medical History:   Patient Active Problem List   Diagnosis     Esophageal reflux     Bilateral sensorineural hearing loss     Chronic diarrhea     Christy's esophagus without dysplasia     Benign essential hypertension     Mixed hyperlipidemia     Pre-diabetes     Genital warts     Coronary artery disease involving native coronary artery of native heart without angina pectoris     Past Medical History:   Diagnosis Date     Chronic diarrhea      Hernia, abdominal 1950s     Hypertension      Spider veins 2000s        CC Referred Self, MD  No address on file on close of this encounter.

## 2022-09-20 NOTE — NURSING NOTE
Chief Complaint   Patient presents with     Derm Problem     Pt reports L lower leg rash, dry lower lip, and bumps on neck, chest, arms, and hands.     Jerel Zuniga, EMT

## 2022-09-20 NOTE — PATIENT INSTRUCTIONS
Please wear compression stockings when on your feet or exercising daily.     Please use ointment at night on rash.

## 2022-10-10 ENCOUNTER — HEALTH MAINTENANCE LETTER (OUTPATIENT)
Age: 77
End: 2022-10-10

## 2023-06-14 ENCOUNTER — OFFICE VISIT (OUTPATIENT)
Dept: UROLOGY | Facility: CLINIC | Age: 78
End: 2023-06-14
Payer: COMMERCIAL

## 2023-06-14 VITALS
HEIGHT: 65 IN | DIASTOLIC BLOOD PRESSURE: 68 MMHG | BODY MASS INDEX: 30.16 KG/M2 | SYSTOLIC BLOOD PRESSURE: 118 MMHG | WEIGHT: 181 LBS

## 2023-06-14 DIAGNOSIS — I47.10 SVT (SUPRAVENTRICULAR TACHYCARDIA) (H): ICD-10-CM

## 2023-06-14 DIAGNOSIS — F33.1 MAJOR DEPRESSIVE DISORDER, RECURRENT, MODERATE (H): ICD-10-CM

## 2023-06-14 DIAGNOSIS — N81.4 PROLAPSE OF UTERUS: ICD-10-CM

## 2023-06-14 DIAGNOSIS — N81.11 CYSTOCELE, MIDLINE: Primary | ICD-10-CM

## 2023-06-14 PROCEDURE — 99214 OFFICE O/P EST MOD 30 MIN: CPT | Performed by: OBSTETRICS & GYNECOLOGY

## 2023-06-14 NOTE — PROGRESS NOTES
"June 14, 2023    Return visit    Patient returns today for f/u of her prolapse. Since her last visit she feels that her symptoms are slightly worse. She wants to discuss a pessary.    /68   Ht 1.651 m (5' 5\")   Wt 82.1 kg (181 lb)   BMI 30.12 kg/m    She is comfortable, in no distress, non-labored breathing.  Abdomen is soft, non-tender, non-distended.  Normal external female genitalia.  Speculum and bimanual exam are remarkable for cystocele to be beyond HR.    A/P: 77 year old F with cystocele and uterine prolaspe    We discussed the diagnosis and treatment options. Treatment options to include:  1) observation with f/u in 6 -12 months  2) pessary fitting  3) surgical management    We discussed all of the options. At this point Mariia would like to try a pessary. Will schedule a pessary fitting    I spent a total of 30 minutes with  Ms. Hogue  on the date of the encounter in chart review, face to face patient visit, review of tests, documentation and/or discussion with other providers about the issues documented above.    Danish Hernandez MD  Professor, OB/GYN  Urogynecologist    CC  Patient Care Team:  Stewart Stevens MD as PCP - General (Family Practice)  Lelo Coughlin MD as MD (Dermatology)  Stewart Stevens MD as Assigned PCP  Danish Hernandez MD as MD (OB/Gyn)  Danish Hernandez MD as Assigned OBGYN Provider  Lelo Coughlin MD as Assigned Surgical Provider      "

## 2023-06-21 ENCOUNTER — OFFICE VISIT (OUTPATIENT)
Dept: OBGYN | Facility: CLINIC | Age: 78
End: 2023-06-21
Payer: COMMERCIAL

## 2023-06-21 VITALS
DIASTOLIC BLOOD PRESSURE: 74 MMHG | BODY MASS INDEX: 30.9 KG/M2 | WEIGHT: 181 LBS | HEIGHT: 64 IN | SYSTOLIC BLOOD PRESSURE: 112 MMHG

## 2023-06-21 DIAGNOSIS — Z01.411 ENCOUNTER FOR GYNECOLOGICAL EXAMINATION WITH ABNORMAL FINDING: Primary | ICD-10-CM

## 2023-06-21 DIAGNOSIS — N90.89 VULVAR MASS: ICD-10-CM

## 2023-06-21 DIAGNOSIS — F10.10 ALCOHOL ABUSE: ICD-10-CM

## 2023-06-21 DIAGNOSIS — N89.8 VAGINAL DRYNESS: ICD-10-CM

## 2023-06-21 DIAGNOSIS — E66.9 OBESITY (BMI 30-39.9): ICD-10-CM

## 2023-06-21 PROCEDURE — 99387 INIT PM E/M NEW PAT 65+ YRS: CPT | Performed by: STUDENT IN AN ORGANIZED HEALTH CARE EDUCATION/TRAINING PROGRAM

## 2023-06-21 PROCEDURE — 99213 OFFICE O/P EST LOW 20 MIN: CPT | Mod: 25 | Performed by: STUDENT IN AN ORGANIZED HEALTH CARE EDUCATION/TRAINING PROGRAM

## 2023-06-21 ASSESSMENT — PATIENT HEALTH QUESTIONNAIRE - PHQ9
5. POOR APPETITE OR OVEREATING: NOT AT ALL
SUM OF ALL RESPONSES TO PHQ QUESTIONS 1-9: 5

## 2023-06-21 ASSESSMENT — ANXIETY QUESTIONNAIRES
GAD7 TOTAL SCORE: 4
7. FEELING AFRAID AS IF SOMETHING AWFUL MIGHT HAPPEN: NOT AT ALL
1. FEELING NERVOUS, ANXIOUS, OR ON EDGE: SEVERAL DAYS
5. BEING SO RESTLESS THAT IT IS HARD TO SIT STILL: NOT AT ALL
IF YOU CHECKED OFF ANY PROBLEMS ON THIS QUESTIONNAIRE, HOW DIFFICULT HAVE THESE PROBLEMS MADE IT FOR YOU TO DO YOUR WORK, TAKE CARE OF THINGS AT HOME, OR GET ALONG WITH OTHER PEOPLE: SOMEWHAT DIFFICULT
GAD7 TOTAL SCORE: 4
3. WORRYING TOO MUCH ABOUT DIFFERENT THINGS: SEVERAL DAYS
6. BECOMING EASILY ANNOYED OR IRRITABLE: SEVERAL DAYS
2. NOT BEING ABLE TO STOP OR CONTROL WORRYING: SEVERAL DAYS

## 2023-06-21 NOTE — PATIENT INSTRUCTIONS
Please schedule follow-up with Dr. Simran Hauser  [address], [address]  [place] [address]  Phone: [phone]      Recommend use of vaginal Replens for dryness        Recommended daily dosage          Population (age)  Vitamin D (IU)          Calcium (mg)  Women    19 to 50 years    600                1,000  51 to 70 years    600                1,200  > 70 years    800      1,200
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
## 2023-06-27 ENCOUNTER — TELEPHONE (OUTPATIENT)
Dept: OBGYN | Facility: CLINIC | Age: 78
End: 2023-06-27

## 2023-06-27 NOTE — TELEPHONE ENCOUNTER
Nutrition Education Scheduling Outreach #1:    Call to patient to schedule. Left message with phone number to call to schedule.    Plan for 2nd outreach attempt within 2 business days.    Mary Garcia OnCall  Diabetes and Nutrition Scheduling

## 2023-07-26 ENCOUNTER — OFFICE VISIT (OUTPATIENT)
Dept: UROLOGY | Facility: CLINIC | Age: 78
End: 2023-07-26
Payer: COMMERCIAL

## 2023-07-26 VITALS
HEIGHT: 65 IN | WEIGHT: 181 LBS | SYSTOLIC BLOOD PRESSURE: 128 MMHG | DIASTOLIC BLOOD PRESSURE: 80 MMHG | BODY MASS INDEX: 30.16 KG/M2

## 2023-07-26 DIAGNOSIS — R30.0 DYSURIA: Primary | ICD-10-CM

## 2023-07-26 DIAGNOSIS — N81.4 PROLAPSE OF UTERUS: ICD-10-CM

## 2023-07-26 DIAGNOSIS — R30.0 DIFFICULT OR PAINFUL URINATION: ICD-10-CM

## 2023-07-26 DIAGNOSIS — N81.11 CYSTOCELE, MIDLINE: ICD-10-CM

## 2023-07-26 LAB
ALBUMIN UR-MCNC: NEGATIVE MG/DL
APPEARANCE UR: CLEAR
BACTERIA #/AREA URNS HPF: ABNORMAL /HPF
BILIRUB UR QL STRIP: NEGATIVE
COLOR UR AUTO: YELLOW
GLUCOSE UR STRIP-MCNC: NEGATIVE MG/DL
HGB UR QL STRIP: ABNORMAL
KETONES UR STRIP-MCNC: NEGATIVE MG/DL
LEUKOCYTE ESTERASE UR QL STRIP: NEGATIVE
NITRATE UR QL: NEGATIVE
PH UR STRIP: 6.5 [PH] (ref 5–7)
RBC #/AREA URNS AUTO: ABNORMAL /HPF
SP GR UR STRIP: 1.02 (ref 1–1.03)
SQUAMOUS #/AREA URNS AUTO: ABNORMAL /LPF
UROBILINOGEN UR STRIP-ACNC: 0.2 E.U./DL
WBC #/AREA URNS AUTO: ABNORMAL /HPF

## 2023-07-26 PROCEDURE — 57160 INSERT PESSARY/OTHER DEVICE: CPT | Performed by: OBSTETRICS & GYNECOLOGY

## 2023-07-26 PROCEDURE — 81001 URINALYSIS AUTO W/SCOPE: CPT | Performed by: OBSTETRICS & GYNECOLOGY

## 2023-07-26 PROCEDURE — 87086 URINE CULTURE/COLONY COUNT: CPT | Performed by: OBSTETRICS & GYNECOLOGY

## 2023-07-26 PROCEDURE — A4562 PESSARY, NON RUBBER,ANY TYPE: HCPCS | Performed by: OBSTETRICS & GYNECOLOGY

## 2023-07-26 RX ORDER — MULTIVITAMIN WITH IRON
1 TABLET ORAL DAILY
COMMUNITY

## 2023-07-26 NOTE — PROGRESS NOTES
July 26, 2023    Referring Provider: No referring provider defined for this encounter.    Primary Care Provider: Stewart Stevens    Pt returns for pessary fitting. Found to have a cystocele and uterine prolapse on previous exam.    Pt fit with a # 2 ring with support. Demonstrated ability to insert and remove without difficulty. Pt counseled to insert and remove pessary and clean with warm water and soap on daily basis. F/U in 2 weeks for a pessary check or sooner for vaginal bleeding or discharge.    Danish Hernandez MD  Professor, OB/GYN  Urogynecologist  CC  Patient Care Team:  Stewart Stevens MD as PCP - General (Family Practice)  Lelo Coughlin MD as MD (Dermatology)  Stewart Stevens MD as Assigned PCP  Danish Hernandez MD as MD (OB/Gyn)  Danish Hernandez MD as Assigned OBGYN Provider  Lelo Coughlin MD as Assigned Surgical Provider

## 2023-07-27 ENCOUNTER — TELEPHONE (OUTPATIENT)
Dept: UROLOGY | Facility: CLINIC | Age: 78
End: 2023-07-27

## 2023-07-27 NOTE — TELEPHONE ENCOUNTER
[facility] Call Center    Phone Message    May a detailed message be left on voicemail: yes     Reason for Call: Other: Pt calling regarding questions she has about her pessary and would like to speak with a care team member. Please call pt     Action Taken: Message routed to:  Other: Uro    Travel Screening: Not Applicable

## 2023-07-28 LAB — BACTERIA UR CULT: NO GROWTH

## 2023-08-23 ENCOUNTER — OFFICE VISIT (OUTPATIENT)
Dept: UROLOGY | Facility: CLINIC | Age: 78
End: 2023-08-23
Payer: COMMERCIAL

## 2023-08-23 VITALS
HEIGHT: 65 IN | DIASTOLIC BLOOD PRESSURE: 74 MMHG | BODY MASS INDEX: 30.16 KG/M2 | WEIGHT: 181 LBS | SYSTOLIC BLOOD PRESSURE: 122 MMHG

## 2023-08-23 DIAGNOSIS — N81.4 PROLAPSE OF UTERUS: ICD-10-CM

## 2023-08-23 DIAGNOSIS — N81.11 CYSTOCELE, MIDLINE: Primary | ICD-10-CM

## 2023-08-23 PROCEDURE — 99214 OFFICE O/P EST MOD 30 MIN: CPT | Performed by: OBSTETRICS & GYNECOLOGY

## 2023-08-23 NOTE — PROGRESS NOTES
"CC: prolapse    Pt here for pessary check. Pt inserting and removing pessary daily, but she feels that the #2 pessary is to small and would like to try the #3.. Pt happy with pessary. No bleeding, spotting or discharge.    /74   Ht 1.651 m (5' 5\")   Wt 82.1 kg (181 lb)   BMI 30.12 kg/m   No LMP recorded. Patient is postmenopausal. Body mass index is 30.12 kg/m .  Ms. Hogue is alert, comfortable in no acute distress, non-labored breathing.   Vagina: appears normal    A/P: Lashell Mariiasaúl Hogue is a 77 year old F with a cystocele and uterine prolapse    Refit with a #3 ring with support    I spent a total of 30 minutes with  Ms. Hogue  on the date of the encounter in chart review, face to face patient visit, review of tests, documentation and/or discussion with other providers about the issues documented above.    Danish Hernandez MD  Professor, OB/GYN  Urogynecologist  CC  Patient Care Team:  Stewart Stevens MD as PCP - General (Family Practice)  Lelo Coughlin MD as MD (Dermatology)  Stewart Stevens MD as Assigned PCP  Danish Hernandez MD as MD (OB/Gyn)  Danish Hernandez MD as Assigned OBGYN Provider  Lelo Coughlin MD as Assigned Surgical Provider      "

## 2023-08-31 ENCOUNTER — TELEPHONE (OUTPATIENT)
Dept: OBGYN | Facility: CLINIC | Age: 78
End: 2023-08-31
Payer: COMMERCIAL

## 2023-08-31 NOTE — TELEPHONE ENCOUNTER
Pt insists Dr Moraes had a list of PCP recommendations - would [facility]    Routing to provider.    Renetta Desai RN on 8/31/2023 at 8:52 AM

## 2023-08-31 NOTE — TELEPHONE ENCOUNTER
Reason for call:  Other   Patient called regarding (reason for call): Patient states that Dr Mota was going to send her a list of PCP's in the [place] area.   Additional comments: Would like the list to be sent via [employer]     Phone number to reach patient:  Cell number on file:    Telephone Information:   Mobile [phone]       Best Time:  Any    Can we leave a detailed message on this number?  YES    Travel screening: Not Applicable

## 2023-09-07 ENCOUNTER — TRANSCRIBE ORDERS (OUTPATIENT)
Dept: GASTROENTEROLOGY | Facility: CLINIC | Age: 78
End: 2023-09-07
Payer: COMMERCIAL

## 2023-09-07 ENCOUNTER — TELEPHONE (OUTPATIENT)
Dept: GASTROENTEROLOGY | Facility: CLINIC | Age: 78
End: 2023-09-07
Payer: COMMERCIAL

## 2023-09-07 DIAGNOSIS — K21.00 GASTROESOPHAGEAL REFLUX DISEASE WITH ESOPHAGITIS: ICD-10-CM

## 2023-09-07 DIAGNOSIS — Z86.0100 HISTORY OF COLONIC POLYPS: Primary | ICD-10-CM

## 2023-09-07 NOTE — TELEPHONE ENCOUNTER
Pre assessment completed for upcoming procedure.      Procedure details:    Patient scheduled for Colonoscopy/Upper endoscopy (EGD) on 9/20/23.     Arrival time: 0920. Procedure time 1020    Pre op exam needed? N/A    [facility] [address] [place] [address], 5[address] Floor, [place] [address]    Sedation type: Conscious sedation     Indication for procedure: history of polyps; GERD    COVID policy reviewed.    Designated  policy reviewed. Instructed to have someone stay 6 hours post procedure.       Chart review:     Electronic implanted devices? No    Diabetic? No      Medication review:    Anticoagulants? No    NSAIDS? Yes.  Ibuprofen (Advil, Motrin).  Holding interval of 1 day before procedure.    Other medication HOLDING recommendations:  N/A      Prep for procedure:     Bowel prep recommendation: Standard Miralax  Due to: standard bowel prep.    Prep instructions sent via [employer]     Reviewed procedure prep instructions.     Patient verbalized understanding and had no questions or concerns at this time.        Emma Isaacs RN  Endoscopy Procedure Pre Assessment RN  [phone] option 4

## 2023-09-07 NOTE — TELEPHONE ENCOUNTER
"Endoscopy Scheduling Screen    Have you had a positive Covid test in the last 14 days?  No    Are you active on MyChart?   Yes    What insurance is in the chart?  Other:  BCBS    Ordering/Referring Provider:     Fareed Quinn PA-C in [facility]      (If ordering provider performs procedure, schedule with ordering provider unless otherwise instructed. )    BMI: Estimated body mass index is 30.12 kg/m  as calculated from the following:    Height as of 8/23/23: 1.651 m (5' 5\").    Weight as of 8/23/23: 82.1 kg (181 lb).     Sedation Ordered  moderate sedation.   If patient BMI > 50 do not schedule in [facility].    If patient BMI > 45 do not schedule at [facility].    Are you taking methadone or Suboxone?  No    Are you taking any prescription medications for pain 3 or more times per week?   No    Do you have a history of malignant hyperthermia or adverse reaction to anesthesia?  No    (Females) Are you currently pregnant?   No     Have you been diagnosed or told you have pulmonary hypertension?   No    Do you have an LVAD?  No    Have you been told you have moderate to severe sleep apnea?  No    Have you been told you have COPD, asthma, or any other lung disease?  No    Do you have any heart conditions?  No     Have you ever had an organ transplant?   No    Have you ever had or are you awaiting a heart or lung transplant?   No    Have you had a stroke or transient ischemic attack (TIA aka \"mini stroke\" in the last 6 months?   No    Have you been diagnosed with or been told you have cirrhosis of the liver?   No    Are you currently on dialysis?   No    Do you need assistance transferring?   No    BMI: Estimated body mass index is 30.12 kg/m  as calculated from the following:    Height as of 8/23/23: 1.651 m (5' 5\").    Weight as of 8/23/23: 82.1 kg (181 lb).     Is patients BMI > 40 and scheduling location [facility]?  No    Do you take an injectable medication for weight loss or diabetes (excluding insulin)?  No    Do you take " the medication Naltrexone?  No    Do you take blood thinners?  No       Prep   Are you currently on dialysis or do you have chronic kidney disease?  No    Do you have a diagnosis of diabetes?  No    Do you have a diagnosis of cystic fibrosis (CF)?  No    On a regular basis do you go 3 -5 days between bowel movements?  No    BMI > 40?  No    Preferred Pharmacy:    [id number] - [place], [place] - [address] [place] [address]  [address] [facility] [address]  Phone: [phone] Fax: [phone]      Final Scheduling Details   Colonoscopy prep sent?  Standard MiraLAX    Procedure scheduled  Colonoscopy / Upper endoscopy (EGD)    Surgeon:  Leventhal     Date of procedure:  09/20/2023     Pre-OP / PAC:   No - Not required for this site.    Location  [facility] - [facility] - Per order.    Sedation   Moderate Sedation - Per order.      Patient Reminders:   You will receive a call from a Nurse to review instructions and health history.  This assessment must be completed prior to your procedure.  Failure to complete the Nurse assessment may result in the procedure being cancelled.      On the day of your procedure, please designate an adult(s) who can drive you home stay with you for the next 24 hours. The medicines used in the exam will make you sleepy. You will not be able to drive.      You cannot take public transportation, ride share services, or non-medical taxi service without a responsible caregiver.  Medical transport services are allowed with the requirement that a responsible caregiver will receive you at your destination.  We require that drivers and caregivers are confirmed prior to your procedure.

## 2023-09-20 ENCOUNTER — HOSPITAL ENCOUNTER (OUTPATIENT)
Facility: AMBULATORY SURGERY CENTER | Age: 78
Discharge: HOME | End: 2023-09-20
Attending: INTERNAL MEDICINE | Admitting: INTERNAL MEDICINE
Payer: COMMERCIAL

## 2023-09-20 VITALS
BODY MASS INDEX: 30.16 KG/M2 | TEMPERATURE: 96.9 F | RESPIRATION RATE: 14 BRPM | HEART RATE: 69 BPM | DIASTOLIC BLOOD PRESSURE: 81 MMHG | HEIGHT: 65 IN | OXYGEN SATURATION: 92 % | WEIGHT: 181 LBS | SYSTOLIC BLOOD PRESSURE: 127 MMHG

## 2023-09-20 DIAGNOSIS — D12.6 ADENOMA OF COLON: Primary | ICD-10-CM

## 2023-09-20 DIAGNOSIS — K21.00 GASTROESOPHAGEAL REFLUX DISEASE WITH ESOPHAGITIS, UNSPECIFIED WHETHER HEMORRHAGE: ICD-10-CM

## 2023-09-20 LAB
COLONOSCOPY: NORMAL
UPPER GI ENDOSCOPY: NORMAL

## 2023-09-20 PROCEDURE — 43239 EGD BIOPSY SINGLE/MULTIPLE: CPT | Performed by: INTERNAL MEDICINE

## 2023-09-20 PROCEDURE — 45385 COLONOSCOPY W/LESION REMOVAL: CPT | Mod: 33 | Performed by: INTERNAL MEDICINE

## 2023-09-20 PROCEDURE — 88305 TISSUE EXAM BY PATHOLOGIST: CPT | Mod: TC | Performed by: INTERNAL MEDICINE

## 2023-09-20 PROCEDURE — 45380 COLONOSCOPY AND BIOPSY: CPT | Mod: 59,33 | Performed by: INTERNAL MEDICINE

## 2023-09-20 RX ORDER — FENTANYL CITRATE 50 UG/ML
INJECTION, SOLUTION INTRAMUSCULAR; INTRAVENOUS DAILY PRN
Status: DISCONTINUED | OUTPATIENT
Start: 2023-09-20 | End: 2023-09-20 | Stop reason: HOSPADM

## 2023-09-20 RX ORDER — NALOXONE HYDROCHLORIDE 0.4 MG/ML
0.2 INJECTION, SOLUTION INTRAMUSCULAR; INTRAVENOUS; SUBCUTANEOUS
Status: DISCONTINUED | OUTPATIENT
Start: 2023-09-20 | End: 2023-09-21 | Stop reason: HOSPADM

## 2023-09-20 RX ORDER — NALOXONE HYDROCHLORIDE 0.4 MG/ML
0.4 INJECTION, SOLUTION INTRAMUSCULAR; INTRAVENOUS; SUBCUTANEOUS
Status: DISCONTINUED | OUTPATIENT
Start: 2023-09-20 | End: 2023-09-21 | Stop reason: HOSPADM

## 2023-09-20 RX ORDER — ONDANSETRON 2 MG/ML
4 INJECTION INTRAMUSCULAR; INTRAVENOUS
Status: DISCONTINUED | OUTPATIENT
Start: 2023-09-20 | End: 2023-09-20 | Stop reason: HOSPADM

## 2023-09-20 RX ORDER — ONDANSETRON 4 MG/1
4 TABLET, ORALLY DISINTEGRATING ORAL EVERY 6 HOURS PRN
Status: DISCONTINUED | OUTPATIENT
Start: 2023-09-20 | End: 2023-09-21 | Stop reason: HOSPADM

## 2023-09-20 RX ORDER — PROCHLORPERAZINE MALEATE 5 MG
5 TABLET ORAL EVERY 6 HOURS PRN
Status: DISCONTINUED | OUTPATIENT
Start: 2023-09-20 | End: 2023-09-21 | Stop reason: HOSPADM

## 2023-09-20 RX ORDER — LIDOCAINE 40 MG/G
CREAM TOPICAL
Status: DISCONTINUED | OUTPATIENT
Start: 2023-09-20 | End: 2023-09-20 | Stop reason: HOSPADM

## 2023-09-20 RX ORDER — ONDANSETRON 2 MG/ML
4 INJECTION INTRAMUSCULAR; INTRAVENOUS EVERY 6 HOURS PRN
Status: DISCONTINUED | OUTPATIENT
Start: 2023-09-20 | End: 2023-09-21 | Stop reason: HOSPADM

## 2023-09-20 RX ORDER — FLUMAZENIL 0.1 MG/ML
0.2 INJECTION, SOLUTION INTRAVENOUS
Status: DISCONTINUED | OUTPATIENT
Start: 2023-09-20 | End: 2023-09-21 | Stop reason: HOSPADM
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
## 2023-09-21 LAB
PATH REPORT.COMMENTS IMP SPEC: NORMAL
PATH REPORT.COMMENTS IMP SPEC: NORMAL
PATH REPORT.FINAL DX SPEC: NORMAL
PATH REPORT.GROSS SPEC: NORMAL
PATH REPORT.MICROSCOPIC SPEC OTHER STN: NORMAL
PATH REPORT.RELEVANT HX SPEC: NORMAL
PHOTO IMAGE: NORMAL

## 2023-09-21 PROCEDURE — 88305 TISSUE EXAM BY PATHOLOGIST: CPT | Mod: 26 | Performed by: PATHOLOGY

## 2023-09-21 NOTE — RESULT ENCOUNTER NOTE
The biopsy of the lesion in the small intestine showed what I thought it would - a benign fatty tumor that won't be a problem    The biopsy in the esophagus again confirmed Christy's esophagus    The biopsies of the colon didn't explain why you have intermittent loose stools.      A least one of the polyps removed during your recent colonoscopy was found to be an adenoma - this is considered to be a precancerous lesion.  Please note there was NO cancer in the biopsies.    Given the finding of this type of polyp I recommend that you undergo a repeat colonoscopy in 3 years.  However, a sooner examination might be necessary if you start developing any symptoms such as rectal bleeding, change in bowel habits, anemia, etc.  Please discuss this with your primary physician at the time of your next office appointment.  Your primary physician will schedule this repeat colonoscopy at the appropriate time.  Please share this information with your family members so they can discuss with their primary physician their need for colorectal cancer screening.      It has been a pleasure to participate in your care.  Please call our clinic if you have any questions or concerns.    Thomas M. Leventhal, M.D.  Associate Professor of Medicine  Advanced & Transplant Hepatology  [facility]

## 2023-10-11 ENCOUNTER — TELEPHONE (OUTPATIENT)
Dept: DERMATOLOGY | Facility: CLINIC | Age: 78
End: 2023-10-11
Payer: COMMERCIAL

## 2023-10-11 NOTE — TELEPHONE ENCOUNTER
[facility] Call Center    Phone Message    May a detailed message be left on voicemail: yes     Reason for Call: Symptoms or Concerns     If patient has red-flag symptoms, warm transfer to triage line    Current symptom or concern: came back with itchy skin and bleeding. The Rx is not working.   On the arms and both legs   Symptoms have been present for:  6 week(s)    Has patient previously been seen for this? No    By : NA    Date: NA    Are there any new or worsening symptoms? Yes: Now bleeding - please call pt back to discuss. Thanks     Action Taken: Message routed to:  [facility] Derm    Travel Screening: Not Applicable

## 2023-10-13 ENCOUNTER — MYC MEDICAL ADVICE (OUTPATIENT)
Dept: DERMATOLOGY | Facility: CLINIC | Age: 78
End: 2023-10-13
Payer: COMMERCIAL

## 2023-10-13 NOTE — TELEPHONE ENCOUNTER
"PATIENT QUESTIONNAIRE    Are you calling about a medication, a prior condition, a new condition, an upcoming appointment or something else?  Rash that appeared a year ago when she saw Dr. Coughlin, she describes eruption legs and arms that is \"just terrible and painful\". The medications she was prescribed are not helping.           Please write any other concerns below:  Patient does not know how long she continue with her skin in the condition it is in. Asked patient to send mychart with photos. Scheduled with derm provider for assessment next week. Sending mychart for photos as well.     2.   What provider did you see last?  Dr. Coughlin    3.   When was your last appointment?  9/20/2022       Anayeli Lainez RN     "

## 2023-10-17 ENCOUNTER — OFFICE VISIT (OUTPATIENT)
Dept: DERMATOLOGY | Facility: CLINIC | Age: 78
End: 2023-10-17

## 2023-10-17 DIAGNOSIS — L82.0 INFLAMED SEBORRHEIC KERATOSIS: Primary | ICD-10-CM

## 2023-10-17 DIAGNOSIS — I87.2 VENOUS STASIS DERMATITIS OF BOTH LOWER EXTREMITIES: ICD-10-CM

## 2023-10-17 PROCEDURE — 99214 OFFICE O/P EST MOD 30 MIN: CPT | Mod: 25 | Performed by: STUDENT IN AN ORGANIZED HEALTH CARE EDUCATION/TRAINING PROGRAM

## 2023-10-17 PROCEDURE — 17110 DESTRUCTION B9 LES UP TO 14: CPT | Performed by: STUDENT IN AN ORGANIZED HEALTH CARE EDUCATION/TRAINING PROGRAM

## 2023-10-17 ASSESSMENT — PAIN SCALES - GENERAL: PAINLEVEL: NO PAIN (0)

## 2023-10-17 NOTE — PATIENT INSTRUCTIONS
"Today, you were seen by Dr. KAYLEIGH Barnett today for evaluation of your stasis dermatitis. For more details, visit the American Academy of Dermatology for more details:  \"What is stasis dermatitis?  This is a common type of eczema that develops in people who have poor blood flow. Because poor blood flow usually develops in the lower legs, stasis dermatitis often appears near your ankles.    Stasis dermatitis can occur in other areas of the body aside from the lower legs, but that s rare.    Is it contagious? No\"     https://www.aad.org/public/diseases/eczema/types/stasis-dermatitis    We discussed applying vaseline to your legs at night underneath saran wrap or long underwear. This will help with moisturizing and treating the dryness of your legs.    Use your previously prescribed fluocinonide as needed when your legs become itchy or inflamed. Use compression stockings to help with the venous stasis and causes of your symptoms.      We discussed amlactin cream to help smooth the areas of your arms. See attached handouts for more information. You can also use the \"soak and smear\" method of bathing and then locking in moisture with your preferred moisturizer.     We also froze several areas of your arms that were itchy and irritated called seborrheic keratoses.     Cryotherapy Instructions    For the areas treated with liquid nitrogen (cryotherapy or freezing) today, they are expected to get pink, puffy, and perhaps even blister. The area should then crust up and fall off and the goal is to have nice new skin underneath. There is nothing special that you need to do for these areas. You can wash them as you do normal skin.     Sometimes a blister develops; if a blister does develop do NOT pop it. However, if it breaks open on its own, be sure to wash it with soap and water daily and put plain vasaline or petroleum jelly and a bandaid on it until the skin is healed.     Please call the clinic if you have any questions or " concerns.    6 month follow up for full body skin exam.

## 2023-10-17 NOTE — LETTER
10/17/2023       RE: Lashell Hogue  [address]  [facility] [id number]     Dear Colleague,    Thank you for referring your patient, Lashell Hogue, to the [facility] [place] [facility]. Please see a copy of my visit note below.    I have personally examined this patient and agree with the medical student's documentation and plan of care. I have reviewed and amended the medical student's note as necessary. I personally performed all procedure(s).The documentation accurately reflects my clinical observations, diagnoses, treatment and follow-up plans.     Connor Barnett M.D.   Dermatology Staff       [facility] Dermatology Note  Encounter Date: Oct 17, 2023  Office Visit     Dermatology Problem List:  1. Photodamage, xerosis  - continue Amlactin  2. Digital mucous cyst  3. Hair thinning  - start minoxidil 5%  4. Rosacea  - Metrogel for 12 weeks, then referral to cosmetics for PDT.  5. Stasis dermatitis  - Current treatment: amlactin, vaseline + saran wrap, fluocinonide ointment 0.05% PRN  6. Seborrheic keratosis  - Cryotherapy: 10/17/23 (bilateral lower arms)    ____________________________________________    Assessment & Plan:    # Venous stasis dermatitis (chronic flaring)  - Apply vaseline nightly and wrap legs in either saran wrap or wear long underwear atop this serum  - Resume fluocinonide QPM PRN for significant itching or increased inflammation. Discussed sx of nausea she experienced last time are likely unrelated   - Discussed importance of wearing compression stockings to help with underlying stasis edema and pathology of the rash    # inflame Seborrheic keratosis, symptomatic, bilateral arms.  - 6 isks on arms  - Cryotherapy performed today (see procedure note(s) below).  - Recheck/Full body skin exam in 6 months    #Xerosis arms  - amlactin daily     Procedures Performed:   -  Cryotherapy procedure note, location(s): bilateral lower arms. After verbal consent and discussion of risks and benefits including, but not limited to, dyspigmentation/scar, blister, and pain, 6 ISKs on arms  was(were) treated with 1-2 mm freeze border for 1-2 cycles with liquid nitrogen. Post cryotherapy instructions were provided.  - Procedure(s) performed by faculty.     Follow-up: 6 month(s) in-person, or earlier for new or changing lesions    Staff and Medical Student:     IStella, saw and examined the patient in the presence of Dr. KAYLEIGH Barnett.    ____________________________________________    CC: Derm Problem (Patient reports flaring on their left lower leg. The patient reports sores on the leg. The patient previously used fluocinonide with improvement but has since reported a reaction to the medication. In addition, the patient reports bumps on their skin that is of concern.)    HPI:  Ms. Lashell Hogue is a(n) 78 year old female who presents today as a return patient for rash of her bilateral legs. She was seen approximately one year ago by Dr. Coughlin for venous stasis dermatitis. At that time she was prescribed fluocinonide cream as well as vaseline 3x/week with good improvement in her symptoms. However, about 2 months ago, she noticed a recurrence in symptoms following a pedicure. She tried using her fluocinonide but reports feeling generally unwell and discontinued the mediation. She also used hydrocortisone for significant itching resulting in some scabbing to the areas. She denies any warmth to either leg and does not report any fevers or chills associated with her rash.    Patient is otherwise feeling well, without additional skin concerns.    Labs:  None reviewed.    Physical Exam:  Vitals: There were no vitals taken for this visit.  SKIN: Focused examination of bilateral lower arms and bilateral lower legs was performed.  - erythema and mild edema with scaling and excoriation on  bilateral lower extremities, worse on left vs. right  - numerous scaly, stuck-on appearing, irritated papules across bilateral lower arms  - No other lesions of concern on areas examined.     Medications:  Current Outpatient Medications   Medication    atorvastatin (LIPITOR) 10 MG tablet    co-enzyme Q-10 100 MG CAPS capsule    esomeprazole (NEXIUM) 20 MG DR capsule    fluocinonide (LIDEX) 0.05 % external ointment    Loperamide HCl (IMODIUM OR)    losartan (COZAAR) 25 MG tablet    Lutein 6 MG CAPS    magnesium 250 MG tablet    metoprolol succinate ER (TOPROL XL) 25 MG 24 hr tablet    metroNIDAZOLE (METROCREAM) 0.75 % external cream    vitamin B-12 (CYANOCOBALAMIN) 1000 MCG tablet    vitamin D3 (CHOLECALCIFEROL) 1000 units (25 mcg) tablet    cholestyramine (QUESTRAN) 4 GM/DOSE powder     No current facility-administered medications for this visit.      Past Medical History:   Patient Active Problem List   Diagnosis    Esophageal reflux    Bilateral sensorineural hearing loss    Chronic diarrhea    Christy's esophagus without dysplasia    Benign essential hypertension    Mixed hyperlipidemia    Pre-diabetes    Genital warts    Coronary artery disease involving native coronary artery of native heart without angina pectoris    Major depressive disorder, recurrent, moderate (H)    SVT (supraventricular tachycardia)     Past Medical History:   Diagnosis Date    Chronic diarrhea     Hernia, abdominal 1950s    Hypertension     Spider veins 2000s        CC No referring provider defined for this encounter. on close of this encounter.

## 2023-10-17 NOTE — PROGRESS NOTES
I have personally examined this patient and agree with the medical student's documentation and plan of care. I have reviewed and amended the medical student's note as necessary. I personally performed all procedure(s).The documentation accurately reflects my clinical observations, diagnoses, treatment and follow-up plans.     Connor Barnett M.D.   Dermatology Staff       [facility] Dermatology Note  Encounter Date: Oct 17, 2023  Office Visit     Dermatology Problem List:  1. Photodamage, xerosis  - continue Amlactin  2. Digital mucous cyst  3. Hair thinning  - start minoxidil 5%  4. Rosacea  - Metrogel for 12 weeks, then referral to cosmetics for PDT.  5. Stasis dermatitis  - Current treatment: amlactin, vaseline + saran wrap, fluocinonide ointment 0.05% PRN  6. Seborrheic keratosis  - Cryotherapy: 10/17/23 (bilateral lower arms)    ____________________________________________    Assessment & Plan:    # Venous stasis dermatitis (chronic flaring)  - Apply vaseline nightly and wrap legs in either saran wrap or wear long underwear atop this serum  - Resume fluocinonide QPM PRN for significant itching or increased inflammation. Discussed sx of nausea she experienced last time are likely unrelated   - Discussed importance of wearing compression stockings to help with underlying stasis edema and pathology of the rash    # inflame Seborrheic keratosis, symptomatic, bilateral arms.  - 6 isks on arms  - Cryotherapy performed today (see procedure note(s) below).  - Recheck/Full body skin exam in 6 months    #Xerosis arms  - amlactin daily     Procedures Performed:   - Cryotherapy procedure note, location(s): bilateral lower arms. After verbal consent and discussion of risks and benefits including, but not limited to, dyspigmentation/scar, blister, and pain, 6 ISKs on arms  was(were) treated with 1-2 mm freeze border for 1-2 cycles with liquid nitrogen. Post cryotherapy instructions were provided.  -  Procedure(s) performed by faculty.     Follow-up: 6 month(s) in-person, or earlier for new or changing lesions    Staff and Medical Student:     I, Stella Llamas, saw and examined the patient in the presence of Dr. KAYLEIGH Barnett.    ____________________________________________    CC: Derm Problem (Patient reports flaring on their left lower leg. The patient reports sores on the leg. The patient previously used fluocinonide with improvement but has since reported a reaction to the medication. In addition, the patient reports bumps on their skin that is of concern.)    HPI:  Ms. Lashell Hogue is a(n) 78 year old female who presents today as a return patient for rash of her bilateral legs. She was seen approximately one year ago by Dr. Coughlin for venous stasis dermatitis. At that time she was prescribed fluocinonide cream as well as vaseline 3x/week with good improvement in her symptoms. However, about 2 months ago, she noticed a recurrence in symptoms following a pedicure. She tried using her fluocinonide but reports feeling generally unwell and discontinued the mediation. She also used hydrocortisone for significant itching resulting in some scabbing to the areas. She denies any warmth to either leg and does not report any fevers or chills associated with her rash.    Patient is otherwise feeling well, without additional skin concerns.    Labs:  None reviewed.    Physical Exam:  Vitals: There were no vitals taken for this visit.  SKIN: Focused examination of bilateral lower arms and bilateral lower legs was performed.  - erythema and mild edema with scaling and excoriation on bilateral lower extremities, worse on left vs. right  - numerous scaly, stuck-on appearing, irritated papules across bilateral lower arms  - No other lesions of concern on areas examined.     Medications:  Current Outpatient Medications   Medication    atorvastatin (LIPITOR) 10 MG tablet    co-enzyme Q-10 100 MG CAPS capsule     esomeprazole (NEXIUM) 20 MG DR capsule    fluocinonide (LIDEX) 0.05 % external ointment    Loperamide HCl (IMODIUM OR)    losartan (COZAAR) 25 MG tablet    Lutein 6 MG CAPS    magnesium 250 MG tablet    metoprolol succinate ER (TOPROL XL) 25 MG 24 hr tablet    metroNIDAZOLE (METROCREAM) 0.75 % external cream    vitamin B-12 (CYANOCOBALAMIN) 1000 MCG tablet    vitamin D3 (CHOLECALCIFEROL) 1000 units (25 mcg) tablet    cholestyramine (QUESTRAN) 4 GM/DOSE powder     No current facility-administered medications for this visit.      Past Medical History:   Patient Active Problem List   Diagnosis    Esophageal reflux    Bilateral sensorineural hearing loss    Chronic diarrhea    Christy's esophagus without dysplasia    Benign essential hypertension    Mixed hyperlipidemia    Pre-diabetes    Genital warts    Coronary artery disease involving native coronary artery of native heart without angina pectoris    Major depressive disorder, recurrent, moderate (H)    SVT (supraventricular tachycardia)     Past Medical History:   Diagnosis Date    Chronic diarrhea     Hernia, abdominal 1950s    Hypertension     Spider veins 2000s        CC No referring provider defined for this encounter. on close of this encounter.

## 2023-10-17 NOTE — NURSING NOTE
Chief Complaint   Patient presents with    Derm Problem     Patient reports flaring on their left lower leg. The patient reports sores on the leg. The patient previously used fluocinonide with improvement but has since reported a reaction to the medication. In addition, the patient reports bumps on their skin that is of concern.     Katheryn Peralta LPN

## 2024-01-10 DIAGNOSIS — K52.9 CHRONIC DIARRHEA: ICD-10-CM

## 2024-01-10 NOTE — TELEPHONE ENCOUNTER
cholestyramine (QUESTRAN) 4 GM/DOSE powder   360 g 3 4/27/2022 4/27/2023     Last Office Visit : 2-2-2021  Future Office visit:   none    Bile Acid Sequestrant Agents Zsbwcv33/10/2024 11:39 AM   Protocol Details Lipid panel on file in past 12 mos    Recent (12 mo) or future (30 days) visit within the authorizing provider's specialty       Lab done in Care Everywhere  5-  LIPID PANEL    ABNORMAL

## 2024-01-11 NOTE — TELEPHONE ENCOUNTER
cholestyramine (QUESTRAN) 4 GM/DOSE powder   360 g 3 4/27/2022 4/27/2023      Last Office Visit : 2-2-2021  Future Office visit:   none     Bile Acid Sequestrant Agents Yiigzw59/10/2024 11:39 AM   Protocol Details Lipid panel on file in past 12 mos    Recent (12 mo) or future (30 days) visit within the authorizing provider's specialty         Lab done in Care Everywhere  5-  LIPID PANEL    ABNORMAL          Routing refill request to provider for review/approval because:  Labs out of range:  Lipid panel  A break in medication  Patient needs to be seen because it has been more than 1 year since last office visit.

## 2024-01-18 RX ORDER — CHOLESTYRAMINE 4 G/9G
4 POWDER, FOR SUSPENSION ORAL DAILY
Qty: 120 G | Refills: 5 | Status: SHIPPED | OUTPATIENT
Start: 2024-01-18 | End: 2024-07-16

## 2024-01-18 NOTE — TELEPHONE ENCOUNTER
Received the following message sent by Dr. Mccann. A AVOB message was sent to patient on 1/11/24 to schedule an appointment. Patient has viewed the AVOB message.    Juan Carlos Mccann MD   to [facility]    1/18/24  8:52 AM  Refill request filled. Can we please have patient do a fuv with [facility]?  Thanks, MD Katya Powers LPN

## 2024-03-11 DIAGNOSIS — I87.2 STASIS DERMATITIS OF BOTH LEGS: ICD-10-CM

## 2024-03-11 NOTE — TELEPHONE ENCOUNTER
[facility] Call Center    Phone Message    May a detailed message be left on voicemail: yes     Reason for Call: Medication Refill Request    Has the patient contacted the pharmacy for the refill? Yes     Name of medication being requested:   fluocinonide (LIDEX) 0.05 % external ointment     Provider who prescribed the medication: Dr. Coughlin     Pharmacy:   [facility] [id number] [place] [address]      Date medication is needed: a couple of days left - pt was wondering if she can get a fill until her next appt. Pt said her rash is getting really bad. Pt was wondering if she can get a cream version instead of the ointment due to the rash being on her leg. Please call her back. Thanks        Action Taken: Message routed to:  [facility] DERM    Travel Screening: Not Applicable

## 2024-03-12 RX ORDER — FLUOCINONIDE 0.5 MG/G
OINTMENT TOPICAL 2 TIMES DAILY
Qty: 60 G | Refills: 6 | Status: SHIPPED | OUTPATIENT
Start: 2024-03-12

## 2024-03-12 NOTE — TELEPHONE ENCOUNTER
fluocinonide (LIDEX) 0.05 % external ointment   Disp-60 g, R-6     Start: 09/20/2022       10/17/2023  [facility] [place]    Connor Barnett MD  Dermatology    Routed because: request per pt call. last written by other provider. Gap in med rf.

## 2024-07-09 ENCOUNTER — OFFICE VISIT (OUTPATIENT)
Dept: DERMATOLOGY | Facility: CLINIC | Age: 79
End: 2024-07-09
Payer: COMMERCIAL

## 2024-07-09 DIAGNOSIS — L82.1 STUCCO KERATOSES: ICD-10-CM

## 2024-07-09 DIAGNOSIS — L72.0 EIC (EPIDERMAL INCLUSION CYST): Primary | ICD-10-CM

## 2024-07-09 DIAGNOSIS — I87.8 VENOUS STASIS: ICD-10-CM

## 2024-07-09 PROCEDURE — 99214 OFFICE O/P EST MOD 30 MIN: CPT | Performed by: STUDENT IN AN ORGANIZED HEALTH CARE EDUCATION/TRAINING PROGRAM

## 2024-07-09 ASSESSMENT — PAIN SCALES - GENERAL: PAINLEVEL: NO PAIN (0)

## 2024-07-09 NOTE — PATIENT INSTRUCTIONS
Use the fluocinonide (blue tube) for any itchy rashes.    Use the amlactin (purple tube) for the white bumps that are benign skin thickenings.    The cause of dry skin is multifactorial. Statin and beta blockers can cause dry skin, so these could explain why your skin has been drying out more often lately. Amlactin will be helpful for this. If this does not work, you can try a lotion with urea in it.    The lesion on your lower abdomen is a ruptured cyst that is still inflamed but in the healing process. This should heal on its own with time. It is reassuring that the culture obtained by your PCP resulted negative and the fact that the tenderness is decreasing.    Let's follow-up in 4-6 weeks about the ruptured cyst.

## 2024-07-09 NOTE — PROGRESS NOTES
"I have personally examined this patient and agree with the medical student's documentation and plan of care. I have reviewed and amended the medical student's note as necessary.The documentation accurately reflects my clinical observations, diagnoses, treatment and follow-up plans.     Connor Barnett MD  Dermatology Staff      [facility] Dermatology Note  Encounter Date: Jul 9, 2024  Office Visit     Dermatology Problem List:  1. Photodamage, xerosis  - continue Amlactin  2. Digital mucous cyst  3. Hair thinning  - start minoxidil 5%  4. Rosacea  - Metrogel for 12 weeks, then referral to cosmetics for PDT.  5. Stasis dermatitis  - Current treatment: amlactin, vaseline + saran wrap, fluocinonide ointment 0.05% PRN  6. Seborrheic keratosis  - Cryotherapy: 10/17/23 (bilateral lower arms)  7. Stucco keratosis  - Current: amlactin  - Consider: urea    ____________________________________________    Assessment & Plan:    # Ruptured/irritated cyst, acute, improving.   Patient p/w an inflamed \"bump\" on the pelvis associated with a whitish-yellow cheesy discharge. Was seen by PCP for this on 7/3/24 and a culture from that visit was negative per patient, reassuring signs that this is not an abscess.   - Patient education: benign nature of lesion and reassuring negative culture   - Monitor: Patient to continue monitoring at home and will contact the clinic for any changes.   - Patient instructed to send [employer] message for a follow-up appointment to excise the cyst if it does not self-resolve.     # Venous stasis dermatitis, chronic, acute flare, not-at-goal  Patient endorses significant improvement with vaseline three times a night with leg wrapping. Has not been using vaseline for the past two months and has started to notice flares. Since the vaseline and fluocinonide worked well for last flare, plan to resume this combination therapy for acute flares.   - Restart vaseline nightly and wrap legs in either " saran wrap or wear long underwear atop this serum   - Restart fluocinonide bid PRN for significant itching or increased inflammation.   - Discussed importance of wearing compression stockings to help with underlying stasis edema and pathology of the rash.    # Stucco keratosis, chronic, stable  # Xerosis, chronic, stable   - Patient education: amlactin for white lesions which can be distinguished as the purple bottle   - Continue amlactin daily; advised patient to consider trial of urea if amlactin does not provide satisfactory results    Procedures Performed:   None    Follow-up: 4-6 weeks if needed, otherwise prn for new or changing lesions    Staff and Medical Student:     Corbin Hernandez on 7/9/2024 at 3:52 PM    Staffed with Dr. Connor Barnett MD  ____________________________________________    CC: Derm Problem ( Venous stasis dermatitis and rosacea recheck- She states that her legs are improved and she uses amlactin OTC cream. //She is currently on an antibiotic for an ingrown hair located on her groin area.)    HPI:  Ms. Lashell Hogue is a(n) 78 year old female who presents today as a return patient for skin exam. Last seen by Dr. Barnett on 10/17/23. No major concerns since then. Reports significant improvement in symptoms with vaseline but has not been using it in past two months.  Mentions a new bump above the genitals that started to hurt this past week, now hurting less. White-yellow purulent discharge. Saw PCP for this 7/3/24 who did a bacterial culture and it was negative. Still very tender. Was prescribed bactrim 14-days for it which she has been taking.     Patient is otherwise feeling well, without additional skin concerns.    Labs:  None reviewed.    Physical Exam:  Vitals: There were no vitals taken for this visit.  SKIN: Focused examination of lower abdomen and legs was performed.  - white keratotic papules scattered diffusely across lower legs and feet  - large violaceous dome-shaped  lesion on left pelvis  - No other lesions of concern on areas examined.     Medications:  Current Outpatient Medications   Medication Sig Dispense Refill    atorvastatin (LIPITOR) 10 MG tablet Take 1 tablet (10 mg) by mouth daily 90 tablet 3    cholestyramine (QUESTRAN) 4 GM/DOSE powder Take 4 g by mouth daily for 180 days For additional refills, please schedule a follow-up appointment at [phone] 120 g 5    co-enzyme Q-10 100 MG CAPS capsule Take 100 mg by mouth daily      esomeprazole (NEXIUM) 20 MG DR capsule Take 20 mg by mouth every morning (before breakfast) Take 30-60 minutes before eating.      fluocinonide (LIDEX) 0.05 % external ointment Apply topically 2 times daily 60 g 6    Loperamide HCl (IMODIUM OR)       losartan (COZAAR) 25 MG tablet Take 1 tablet (25 mg) by mouth daily . DOSE CHANGE. 90 tablet 3    Lutein 6 MG CAPS       magnesium 250 MG tablet Take 1 tablet by mouth daily      metoprolol succinate ER (TOPROL XL) 25 MG 24 hr tablet 12.5 mg      metroNIDAZOLE (METROCREAM) 0.75 % external cream Apply topically 2 times daily 45 g 3    vitamin B-12 (CYANOCOBALAMIN) 1000 MCG tablet Take 1,000 mcg by mouth      vitamin D3 (CHOLECALCIFEROL) 1000 units (25 mcg) tablet Take by mouth daily       No current facility-administered medications for this visit.      Past Medical History:   Patient Active Problem List   Diagnosis    Esophageal reflux    Bilateral sensorineural hearing loss    Chronic diarrhea    Christy's esophagus without dysplasia    Benign essential hypertension    Mixed hyperlipidemia    Pre-diabetes    Genital warts    Coronary artery disease involving native coronary artery of native heart without angina pectoris    Major depressive disorder, recurrent, moderate (H)    SVT (supraventricular tachycardia) (H24)     Past Medical History:   Diagnosis Date    Chronic diarrhea     Hernia, abdominal 1950s    Hypertension     Spider veins 2000s        CC Referred Self, MD  No address on file on  close of this encounter.

## 2024-07-09 NOTE — LETTER
"7/9/2024       RE: Lashell Hogue  [address]  [facility] [address]     Dear Colleague,    Thank you for referring your patient, Lashell Hogue, to the [facility] DERMATOLOGY CLINIC [place] at [facility]. Please see a copy of my visit note below.    I have personally examined this patient and agree with the medical student's documentation and plan of care. I have reviewed and amended the medical student's note as necessary.The documentation accurately reflects my clinical observations, diagnoses, treatment and follow-up plans.     Connor Barnett MD  Dermatology Staff      [facility] Dermatology Note  Encounter Date: Jul 9, 2024  Office Visit     Dermatology Problem List:  1. Photodamage, xerosis  - continue Amlactin  2. Digital mucous cyst  3. Hair thinning  - start minoxidil 5%  4. Rosacea  - Metrogel for 12 weeks, then referral to cosmetics for PDT.  5. Stasis dermatitis  - Current treatment: amlactin, vaseline + saran wrap, fluocinonide ointment 0.05% PRN  6. Seborrheic keratosis  - Cryotherapy: 10/17/23 (bilateral lower arms)  7. Stucco keratosis  - Current: amlactin  - Consider: urea    ____________________________________________    Assessment & Plan:    # Ruptured/irritated cyst, acute, improving.   Patient p/w an inflamed \"bump\" on the pelvis associated with a whitish-yellow cheesy discharge. Was seen by PCP for this on 7/3/24 and a culture from that visit was negative per patient, reassuring signs that this is not an abscess.   - Patient education: benign nature of lesion and reassuring negative culture   - Monitor: Patient to continue monitoring at home and will contact the clinic for any changes.   - Patient instructed to send [employer] message for a follow-up appointment to excise the cyst if it does not self-resolve.     # Venous stasis dermatitis, chronic, acute flare, not-at-goal  Patient " endorses significant improvement with vaseline three times a night with leg wrapping. Has not been using vaseline for the past two months and has started to notice flares. Since the vaseline and fluocinonide worked well for last flare, plan to resume this combination therapy for acute flares.   - Restart vaseline nightly and wrap legs in either saran wrap or wear long underwear atop this serum   - Restart fluocinonide bid PRN for significant itching or increased inflammation.   - Discussed importance of wearing compression stockings to help with underlying stasis edema and pathology of the rash.    # Stucco keratosis, chronic, stable  # Xerosis, chronic, stable   - Patient education: amlactin for white lesions which can be distinguished as the purple bottle   - Continue amlactin daily; advised patient to consider trial of urea if amlactin does not provide satisfactory results    Procedures Performed:   None    Follow-up: 4-6 weeks if needed, otherwise prn for new or changing lesions    Staff and Medical Student:     Corbin Hernandez on 7/9/2024 at 3:52 PM    Staffed with Dr. Connor Barnett MD  ____________________________________________    CC: Derm Problem ( Venous stasis dermatitis and rosacea recheck- She states that her legs are improved and she uses amlactin OTC cream. //She is currently on an antibiotic for an ingrown hair located on her groin area.)    HPI:  Ms. Lashell Hogue is a(n) 78 year old female who presents today as a return patient for skin exam. Last seen by Dr. Barnett on 10/17/23. No major concerns since then. Reports significant improvement in symptoms with vaseline but has not been using it in past two months.  Mentions a new bump above the genitals that started to hurt this past week, now hurting less. White-yellow purulent discharge. Saw PCP for this 7/3/24 who did a bacterial culture and it was negative. Still very tender. Was prescribed bactrim 14-days for it which she has been taking.      Patient is otherwise feeling well, without additional skin concerns.    Labs:  None reviewed.    Physical Exam:  Vitals: There were no vitals taken for this visit.  SKIN: Focused examination of lower abdomen and legs was performed.  - white keratotic papules scattered diffusely across lower legs and feet  - large violaceous dome-shaped lesion on left pelvis  - No other lesions of concern on areas examined.     Medications:  Current Outpatient Medications   Medication Sig Dispense Refill     atorvastatin (LIPITOR) 10 MG tablet Take 1 tablet (10 mg) by mouth daily 90 tablet 3     cholestyramine (QUESTRAN) 4 GM/DOSE powder Take 4 g by mouth daily for 180 days For additional refills, please schedule a follow-up appointment at [phone] 120 g 5     co-enzyme Q-10 100 MG CAPS capsule Take 100 mg by mouth daily       esomeprazole (NEXIUM) 20 MG DR capsule Take 20 mg by mouth every morning (before breakfast) Take 30-60 minutes before eating.       fluocinonide (LIDEX) 0.05 % external ointment Apply topically 2 times daily 60 g 6     Loperamide HCl (IMODIUM OR)        losartan (COZAAR) 25 MG tablet Take 1 tablet (25 mg) by mouth daily . DOSE CHANGE. 90 tablet 3     Lutein 6 MG CAPS        magnesium 250 MG tablet Take 1 tablet by mouth daily       metoprolol succinate ER (TOPROL XL) 25 MG 24 hr tablet 12.5 mg       metroNIDAZOLE (METROCREAM) 0.75 % external cream Apply topically 2 times daily 45 g 3     vitamin B-12 (CYANOCOBALAMIN) 1000 MCG tablet Take 1,000 mcg by mouth       vitamin D3 (CHOLECALCIFEROL) 1000 units (25 mcg) tablet Take by mouth daily       No current facility-administered medications for this visit.      Past Medical History:   Patient Active Problem List   Diagnosis     Esophageal reflux     Bilateral sensorineural hearing loss     Chronic diarrhea     Christy's esophagus without dysplasia     Benign essential hypertension     Mixed hyperlipidemia     Pre-diabetes     Genital warts     Coronary  artery disease involving native coronary artery of native heart without angina pectoris     Major depressive disorder, recurrent, moderate (H)     SVT (supraventricular tachycardia) (H24)     Past Medical History:   Diagnosis Date     Chronic diarrhea      Hernia, abdominal 1950s     Hypertension      Spider veins 2000s        CC Referred Self, MD  No address on file on close of this encounter.      Again, thank you for allowing me to participate in the care of your patient.      Sincerely,    Connor Barnett MD

## 2024-08-04 ENCOUNTER — HEALTH MAINTENANCE LETTER (OUTPATIENT)
Age: 79
End: 2024-08-04

## 2024-09-20 ENCOUNTER — OFFICE VISIT (OUTPATIENT)
Dept: DERMATOLOGY | Facility: CLINIC | Age: 79
End: 2024-09-20
Payer: COMMERCIAL

## 2024-09-20 DIAGNOSIS — I87.2 STASIS DERMATITIS: Primary | ICD-10-CM

## 2024-09-20 PROCEDURE — 99213 OFFICE O/P EST LOW 20 MIN: CPT | Performed by: PHYSICIAN ASSISTANT

## 2024-09-20 RX ORDER — FLUOCINONIDE 0.5 MG/G
CREAM TOPICAL 2 TIMES DAILY
Qty: 120 G | Refills: 1 | Status: SHIPPED | OUTPATIENT
Start: 2024-09-20

## 2024-09-20 RX ORDER — FLUOCINONIDE 0.5 MG/G
CREAM TOPICAL 2 TIMES DAILY
Qty: 120 G | Refills: 1 | Status: SHIPPED | OUTPATIENT
Start: 2024-09-20 | End: 2024-09-20

## 2024-09-20 ASSESSMENT — PAIN SCALES - GENERAL: PAINLEVEL: EXTREME PAIN (8)

## 2024-09-20 NOTE — NURSING NOTE
Dermatology Rooming Note    Lashell Hogue's goals for this visit include:   Chief Complaint   Patient presents with    Derm Problem     Rash- Started last week started with one spot and after scratching it spread.      Ishmael Calderon, EMT  Clinic Support  [facility]     [phone]    Employed by [facility] [employer]

## 2024-09-20 NOTE — PROGRESS NOTES
[facility] Dermatology Note  Encounter Date: Sep 20, 2024  Office Visit      Dermatology Problem List:    1. Photodamage, xerosis  - continue Amlactin  2. Digital mucous cyst  3. Hair thinning  - start minoxidil 5%  4. Rosacea  - Metrogel for 12 weeks, then referral to cosmetics for PDT.  5. Stasis dermatitis  - Current treatment: amlactin, vaseline + saran wrap, fluocinonide ointment 0.05% PRN  6. Seborrheic keratosis  - Cryotherapy: 10/17/23 (bilateral lower arms)  7. Stucco keratosis  - Current: amlactin  - Consider: urea  ____________________________________________    Assessment & Plan:    # Venous stasis dermatitis, chronic, acute flare, not-at-goal   - Restart fluocinonide bid PRN for significant itching or increased inflammation. Will switch from ointment to cream   - Discussed importance of wearing compression stockings to help with underlying stasis edema and pathology of the rash.  - patient needs to re-establish care with PCP  - edu on underlying etiology of stasis dermatitis, she has 1+ pitting edema on exam today  - continue moisturizers BID    Procedures Performed:   None     Follow-up: prn for new or changing lesions    Staff and scribe:    Scribe Disclosure:   I, JANICE JONES, am serving as a scribe; to document services personally performed by Pilar Saldaña PA-C -based on data collection and the provider's statements to me.     Provider Disclosure:  I agree with above History, Review of Systems, Physical exam and Plan.  I have reviewed the content of the documentation and have edited it as needed. I have personally performed the services documented here and the documentation accurately represents those services and the decisions I have made.      Electronically signed by:    All risks, benefits and alternatives were discussed with patient.  Patient is in agreement and understands the assessment and plan.  All questions were answered.    Pilar Saldaña PA-C, [facility]  [facility]: Phone: [phone], Fax: [phone]  [facility]: Phone: [phone],  Fax: [phone]  [facility] [place]: Phone: [phone], Fax: [phone]  ____________________________________________    CC: Derm Problem (Rash- Started last week started with one spot and after scratching it spread. )      Reviewed patients past medical history and pertinent chart review prior to patient's visit today.     HPI:  Ms. Lashell Hogue is a 79 year old female who presents today as a return patient for a rash evaluation. Rash started last week with one spot and after scratching it, it has spread.  Tried use of fluocinonide solution without relief. Hx sasis dermatitis in past.     Patient is otherwise feeling well, without additional concerns.    Labs:  N/A    Physical Exam:  Vitals: There were no vitals taken for this visit.  SKIN: Focused examination of areas noted below was performed.   - 1+ pitting edema bilaterally LEs  - L lower leg with erythema and scale  - No other lesions of concern on areas examined.     Medications:  Current Outpatient Medications   Medication Sig Dispense Refill    atorvastatin (LIPITOR) 10 MG tablet Take 1 tablet (10 mg) by mouth daily 90 tablet 3    co-enzyme Q-10 100 MG CAPS capsule Take 100 mg by mouth daily      esomeprazole (NEXIUM) 20 MG DR capsule Take 20 mg by mouth every morning (before breakfast) Take 30-60 minutes before eating.      fluocinonide (LIDEX) 0.05 % external ointment Apply topically 2 times daily 60 g 6    Loperamide HCl (IMODIUM OR)       losartan (COZAAR) 25 MG tablet Take 1 tablet (25 mg) by mouth daily . DOSE CHANGE. 90 tablet 3    Lutein 6 MG CAPS       magnesium 250 MG tablet Take 1 tablet by mouth daily      metoprolol succinate ER (TOPROL XL) 25 MG 24 hr tablet 12.5 mg      metroNIDAZOLE (METROCREAM) 0.75 % external cream Apply topically 2 times daily 45  g 3    vitamin B-12 (CYANOCOBALAMIN) 1000 MCG tablet Take 1,000 mcg by mouth      vitamin D3 (CHOLECALCIFEROL) 1000 units (25 mcg) tablet Take by mouth daily      cholestyramine (QUESTRAN) 4 GM/DOSE powder Take 4 g by mouth daily for 180 days For additional refills, please schedule a follow-up appointment at [phone] 120 g 5     No current facility-administered medications for this visit.      Past Medical/Surgical History:   Patient Active Problem List   Diagnosis    Esophageal reflux    Bilateral sensorineural hearing loss    Chronic diarrhea    Christy's esophagus without dysplasia    Benign essential hypertension    Mixed hyperlipidemia    Pre-diabetes    Genital warts    Coronary artery disease involving native coronary artery of native heart without angina pectoris    Major depressive disorder, recurrent, moderate (H)    SVT (supraventricular tachycardia) (H24)     Past Medical History:   Diagnosis Date    Chronic diarrhea     Hernia, abdominal 1950s    Hypertension     Spider veins 2000s

## 2024-09-20 NOTE — LETTER
9/20/2024       RE: Lashell Hogue  [address]  [facility] [address]     Dear Colleague,    Thank you for referring your patient, Lashell Hogue, to the [facility] DERMATOLOGY CLINIC [place] at [facility]. Please see a copy of my visit note below.    [facility] Dermatology Note  Encounter Date: Sep 20, 2024  Office Visit      Dermatology Problem List:    1. Photodamage, xerosis  - continue Amlactin  2. Digital mucous cyst  3. Hair thinning  - start minoxidil 5%  4. Rosacea  - Metrogel for 12 weeks, then referral to cosmetics for PDT.  5. Stasis dermatitis  - Current treatment: amlactin, vaseline + saran wrap, fluocinonide ointment 0.05% PRN  6. Seborrheic keratosis  - Cryotherapy: 10/17/23 (bilateral lower arms)  7. Stucco keratosis  - Current: amlactin  - Consider: urea  ____________________________________________    Assessment & Plan:    # Venous stasis dermatitis, chronic, acute flare, not-at-goal   - Restart fluocinonide bid PRN for significant itching or increased inflammation. Will switch from ointment to cream   - Discussed importance of wearing compression stockings to help with underlying stasis edema and pathology of the rash.  - patient needs to re-establish care with PCP  - edu on underlying etiology of stasis dermatitis, she has 1+ pitting edema on exam today  - continue moisturizers BID    Procedures Performed:   None     Follow-up: prn for new or changing lesions    Staff and scribe:    Scribe Disclosure:   I, JANICE JONES, am serving as a scribe; to document services personally performed by Pilar Saldaña PA-C -based on data collection and the provider's statements to me.     Provider Disclosure:  I agree with above History, Review of Systems, Physical exam and Plan.  I have reviewed the content of the documentation and have edited it as needed. I have personally performed the services  documented here and the documentation accurately represents those services and the decisions I have made.      Electronically signed by:    All risks, benefits and alternatives were discussed with patient.  Patient is in agreement and understands the assessment and plan.  All questions were answered.    Pilar Saldaña PA-C, MPAS  [facility] [place]: Phone: [phone], Fax: [phone]  [facility]: Phone: [phone],  Fax: [phone]  [facility]: Phone: [phone], Fax: [phone]  ____________________________________________    CC: Derm Problem (Rash- Started last week started with one spot and after scratching it spread. )      Reviewed patients past medical history and pertinent chart review prior to patient's visit today.     HPI:  Ms. Lashell Hogue is a 79 year old female who presents today as a return patient for a rash evaluation. Rash started last week with one spot and after scratching it, it has spread.  Tried use of fluocinonide solution without relief. Hx sasis dermatitis in past.     Patient is otherwise feeling well, without additional concerns.    Labs:  N/A    Physical Exam:  Vitals: There were no vitals taken for this visit.  SKIN: Focused examination of areas noted below was performed.   - 1+ pitting edema bilaterally LEs  - L lower leg with erythema and scale  - No other lesions of concern on areas examined.     Medications:  Current Outpatient Medications   Medication Sig Dispense Refill     atorvastatin (LIPITOR) 10 MG tablet Take 1 tablet (10 mg) by mouth daily 90 tablet 3     co-enzyme Q-10 100 MG CAPS capsule Take 100 mg by mouth daily       esomeprazole (NEXIUM) 20 MG DR capsule Take 20 mg by mouth every morning (before breakfast) Take 30-60 minutes before eating.       fluocinonide (LIDEX) 0.05 % external ointment Apply topically 2 times daily 60 g 6     Loperamide HCl  (IMODIUM OR)        losartan (COZAAR) 25 MG tablet Take 1 tablet (25 mg) by mouth daily . DOSE CHANGE. 90 tablet 3     Lutein 6 MG CAPS        magnesium 250 MG tablet Take 1 tablet by mouth daily       metoprolol succinate ER (TOPROL XL) 25 MG 24 hr tablet 12.5 mg       metroNIDAZOLE (METROCREAM) 0.75 % external cream Apply topically 2 times daily 45 g 3     vitamin B-12 (CYANOCOBALAMIN) 1000 MCG tablet Take 1,000 mcg by mouth       vitamin D3 (CHOLECALCIFEROL) 1000 units (25 mcg) tablet Take by mouth daily       cholestyramine (QUESTRAN) 4 GM/DOSE powder Take 4 g by mouth daily for 180 days For additional refills, please schedule a follow-up appointment at [phone] 120 g 5     No current facility-administered medications for this visit.      Past Medical/Surgical History:   Patient Active Problem List   Diagnosis     Esophageal reflux     Bilateral sensorineural hearing loss     Chronic diarrhea     Christy's esophagus without dysplasia     Benign essential hypertension     Mixed hyperlipidemia     Pre-diabetes     Genital warts     Coronary artery disease involving native coronary artery of native heart without angina pectoris     Major depressive disorder, recurrent, moderate (H)     SVT (supraventricular tachycardia) (H24)     Past Medical History:   Diagnosis Date     Chronic diarrhea      Hernia, abdominal 1950s     Hypertension      Spider veins 2000s                        Again, thank you for allowing me to participate in the care of your patient.      Sincerely,    Pilar Saldaña PA-C

## 2024-11-04 DIAGNOSIS — K52.9 CHRONIC DIARRHEA: ICD-10-CM

## 2024-11-08 RX ORDER — CHOLESTYRAMINE 4 G/9G
4 POWDER, FOR SUSPENSION ORAL DAILY
Qty: 120 G | Refills: 5 | OUTPATIENT
Start: 2024-11-08

## 2024-11-08 NOTE — TELEPHONE ENCOUNTER
cholestyramine (QUESTRAN) 4 GM/DOSE powder    Disp-120 g, R-5,   Start: 01/18/2024 9/30/23  procedure  ESOPHAGOGASTRODUODENOSCOPY, WITH BIOPSY   Leventhal, Thomas Michael, MD     COLONOSCOPY, WITH POLYPECTOMY AND BIOPSY     Routed because:   Bile Acid Sequestrant Agents [id number]/08/2024 09:43 AM   Protocol Details Lipid panel on file in past 12 mos    Recent (6 mo) or future (90 days) visit within the authorizing provider's specialty

## 2024-11-12 ENCOUNTER — TELEPHONE (OUTPATIENT)
Dept: GASTROENTEROLOGY | Facility: CLINIC | Age: 79
End: 2024-11-12
Payer: COMMERCIAL

## 2024-11-12 NOTE — TELEPHONE ENCOUNTER
[facility] Call Center    Phone Message    May a detailed message be left on voicemail: yes     Reason for Call: Other: Writer informed pt she will need a new referral, she was last seen 3+ yrs ago.      Action Taken: Other: mg gi    Travel Screening: Not Applicable     Date of Service:

## 2024-11-12 NOTE — TELEPHONE ENCOUNTER
[facility] Call Center    Phone Message    May a detailed message be left on voicemail: yes     Reason for Call: Other: Pt requesting to change medical providers, she is requesting to see Dr Garland, would like to be seen closer to home. [facility] is too far away.      Action Taken: Other: [facility]     Travel Screening: Not Applicable     Date of Service:

## 2025-01-15 NOTE — TELEPHONE ENCOUNTER
Spoke with Mariia and informed her of lab orders being placed.  Advised to be fasting and she was aware of this. Answered all questions.    Stewart Stevens MD 3 days ago        Labs are ordered.           hypertension

## 2025-08-16 ENCOUNTER — HEALTH MAINTENANCE LETTER (OUTPATIENT)
Age: 80
End: 2025-08-16

## (undated) DEVICE — SUCTION MANIFOLD DORNOCH ULTRA CART UL-CL500

## (undated) DEVICE — SPECIMEN CONTAINER 3OZ W/FORMALIN 59901

## (undated) DEVICE — PACK ENDOSCOPY GI CUSTOM UMMC

## (undated) DEVICE — SYR 30ML SLIP TIP W/O NDL 302833

## (undated) DEVICE — SNARE CAPIVATOR ROUND COLD SNR BX10 M00561101

## (undated) DEVICE — NDL SCLEROTHERAPY 25GA CARR-LOCK  00711811

## (undated) DEVICE — KIT ENDO TURNOVER/PROCEDURE CARRY-ON 101822

## (undated) DEVICE — ENDO BITE BLOCK ADULT OMNI-BLOC

## (undated) DEVICE — SOL WATER IRRIG 1000ML BOTTLE 2F7114

## (undated) DEVICE — Device

## (undated) DEVICE — ESU GROUND PAD ADULT W/CORD E7507

## (undated) DEVICE — KIT ENDO FIRST STEP DISINFECTANT 200ML W/POUCH EP-4

## (undated) DEVICE — ENDO CAP AND TUBING STERILE FOR ENDOGATOR  100130

## (undated) DEVICE — WIPE PREMOIST CLEANSING WASHCLOTHS 7988

## (undated) DEVICE — KIT CONNECTOR FOR OLYMPUS ENDOSCOPES DEFENDO 100310

## (undated) DEVICE — ENDO SNARE POLYPECTOMY SPIRAL 20MM LOOP SD-230U-20

## (undated) DEVICE — ENDO FORCEP BX CAPTURA PRO SPIKE G50696

## (undated) DEVICE — SUCTION CATH AIRLIFE TRI-FLO W/CONTROL PORT 14FR  T60C

## (undated) DEVICE — ENDO TRAP POLYP E-TRAP 00711099

## (undated) DEVICE — TUBING SUCTION MEDI-VAC 1/4"X20' N620A

## (undated) DEVICE — CAPTIVATOR II

## (undated) DEVICE — ENDO TUBING CO2 SMARTCAP STERILE DISP 100145CO2EXT

## (undated) DEVICE — SOL WATER IRRIG 500ML BOTTLE 2F7113

## (undated) DEVICE — TAPE CLOTH 3" CARDINAL 3TRCL03

## (undated) DEVICE — SUCTION MANIFOLD NEPTUNE 2 SYS 1 PORT 702-025-000

## (undated) DEVICE — PREP CHLORAPREP 26ML TINTED ORANGE  260815

## (undated) DEVICE — ENDO FORCEP SPIKED SERRATED SHAFT JUMBO 239CM G56998

## (undated) DEVICE — GOWN IMPERVIOUS 2XL BLUE

## (undated) DEVICE — TUBING SUCTION 12"X1/4" N612

## (undated) DEVICE — PAD CHUX UNDERPAD 23X24" 7136

## (undated) DEVICE — ENDO SNARE EXACTO COLD 9MM LOOP 2.4MMX230CM 00711115

## (undated) RX ORDER — DIAZEPAM 5 MG
TABLET ORAL
Status: DISPENSED
Start: 2017-03-13

## (undated) RX ORDER — FENTANYL CITRATE 50 UG/ML
INJECTION, SOLUTION INTRAMUSCULAR; INTRAVENOUS
Status: DISPENSED
Start: 2023-09-20

## (undated) RX ORDER — LIDOCAINE HYDROCHLORIDE 20 MG/ML
SOLUTION OROPHARYNGEAL
Status: DISPENSED
Start: 2019-07-10

## (undated) RX ORDER — IBUPROFEN 400 MG/1
TABLET, FILM COATED ORAL
Status: DISPENSED
Start: 2017-03-13